# Patient Record
Sex: FEMALE | Race: WHITE
[De-identification: names, ages, dates, MRNs, and addresses within clinical notes are randomized per-mention and may not be internally consistent; named-entity substitution may affect disease eponyms.]

---

## 2017-01-19 ENCOUNTER — HOSPITAL ENCOUNTER (OUTPATIENT)
Dept: HOSPITAL 47 - LABWHC1 | Age: 52
Discharge: HOME | End: 2017-01-19
Payer: COMMERCIAL

## 2017-01-19 DIAGNOSIS — D64.9: ICD-10-CM

## 2017-01-19 DIAGNOSIS — C75.1: Primary | ICD-10-CM

## 2017-01-19 DIAGNOSIS — A49.02: ICD-10-CM

## 2017-01-19 DIAGNOSIS — R63.5: ICD-10-CM

## 2017-01-19 LAB
CH: 32.3
CHCM: 32.6
CHOLEST SERPL-MCNC: 244 MG/DL (ref ?–200)
ERYTHROCYTE [DISTWIDTH] IN BLOOD BY AUTOMATED COUNT: 4.62 M/UL (ref 3.8–5.4)
ERYTHROCYTE [DISTWIDTH] IN BLOOD: 12.9 % (ref 11.5–15.5)
HCT VFR BLD AUTO: 46 % (ref 34–46)
HDLC SERPL-MCNC: 101 MG/DL (ref 40–60)
HDW: 2.4
HGB BLD-MCNC: 14.8 GM/DL (ref 11.4–16)
IRON SERPL-MCNC: 174 UG/DL (ref 37–170)
MCH RBC QN AUTO: 32.1 PG (ref 25–35)
MCHC RBC AUTO-ENTMCNC: 32.2 G/DL (ref 31–37)
MCV RBC AUTO: 99.6 FL (ref 80–100)
TIBC SERPL-MCNC: 381 UG/DL (ref 265–497)
TRIGL SERPL-MCNC: 368 MG/DL (ref ?–150)
WBC # BLD AUTO: 7.7 K/UL (ref 3.8–10.6)

## 2017-01-19 PROCEDURE — 82533 TOTAL CORTISOL: CPT

## 2017-01-19 PROCEDURE — 85027 COMPLETE CBC AUTOMATED: CPT

## 2017-01-19 PROCEDURE — 83540 ASSAY OF IRON: CPT

## 2017-01-19 PROCEDURE — 87040 BLOOD CULTURE FOR BACTERIA: CPT

## 2017-01-19 PROCEDURE — 84439 ASSAY OF FREE THYROXINE: CPT

## 2017-01-19 PROCEDURE — 36415 COLL VENOUS BLD VENIPUNCTURE: CPT

## 2017-01-19 PROCEDURE — 80061 LIPID PANEL: CPT

## 2017-01-19 PROCEDURE — 84146 ASSAY OF PROLACTIN: CPT

## 2017-01-19 PROCEDURE — 84443 ASSAY THYROID STIM HORMONE: CPT

## 2017-01-19 PROCEDURE — 83550 IRON BINDING TEST: CPT

## 2017-01-31 ENCOUNTER — HOSPITAL ENCOUNTER (EMERGENCY)
Dept: HOSPITAL 47 - EC | Age: 52
Discharge: HOME | End: 2017-01-31
Payer: COMMERCIAL

## 2017-01-31 VITALS
RESPIRATION RATE: 17 BRPM | TEMPERATURE: 99 F | SYSTOLIC BLOOD PRESSURE: 129 MMHG | DIASTOLIC BLOOD PRESSURE: 64 MMHG | HEART RATE: 107 BPM

## 2017-01-31 DIAGNOSIS — F32.9: ICD-10-CM

## 2017-01-31 DIAGNOSIS — Z79.899: ICD-10-CM

## 2017-01-31 DIAGNOSIS — Z86.14: ICD-10-CM

## 2017-01-31 DIAGNOSIS — F17.200: ICD-10-CM

## 2017-01-31 DIAGNOSIS — L02.412: Primary | ICD-10-CM

## 2017-01-31 DIAGNOSIS — Z79.890: ICD-10-CM

## 2017-01-31 DIAGNOSIS — F90.9: ICD-10-CM

## 2017-01-31 DIAGNOSIS — F41.9: ICD-10-CM

## 2017-01-31 PROCEDURE — 10060 I&D ABSCESS SIMPLE/SINGLE: CPT

## 2017-01-31 PROCEDURE — 87205 SMEAR GRAM STAIN: CPT

## 2017-01-31 PROCEDURE — 99284 EMERGENCY DEPT VISIT MOD MDM: CPT

## 2017-01-31 PROCEDURE — 87186 SC STD MICRODIL/AGAR DIL: CPT

## 2017-01-31 PROCEDURE — 87070 CULTURE OTHR SPECIMN AEROBIC: CPT

## 2017-01-31 PROCEDURE — 87077 CULTURE AEROBIC IDENTIFY: CPT

## 2017-01-31 NOTE — US
EXAMINATION TYPE: US axilla extremity RT

 

DATE OF EXAM: 1/31/2017 7:56 PM

 

COMPARISON: NONE

 

CLINICAL HISTORY: Pain and redness in right axilla x4 days.

 

Grayscale, color Doppler imaging performed.

 

TECHNOLOGIST IMPRESSION:  Anechoic area with debris is visualized at the patient's area of pain and r
edness measuring 0.9 x 0.4 x 1.0 cm

 

 

 

IMPRESSION:  There may be a small area of phlegmon or abscess, sebaceous cyst with superinfection at 
the site of patient's clinical abnormality.

## 2017-01-31 NOTE — ED
Skin/Abscess/FB HPI





- General


Chief complaint: Skin/Abscess/Foreign Body


Stated complaint: Abcess underarm


Time Seen by Provider: 01/31/17 17:58


Source: patient, RN notes reviewed


Mode of arrival: ambulatory


Limitations: no limitations





- History of Present Illness


Initial comments: 





Patient is a 51 year female chief complaint of an abscess underneath her right 

arm.  Patient reports that she's noticed this for 3 days.  She states that the 

pain is worse with movement.  She did not states that the redness started to 

spread towards the anterior shoulder.  She reports that she had an abscess over 

her leg a few months ago however it cleared up with antibiotic speech she 

denies any significant history of resistant bacterial infections including MRSA 

or C. diff. 





- Related Data


 Home Medications











 Medication  Instructions  Recorded  Confirmed


 


Levothyroxine Sodium [Synthroid] 50 mcg PO DAILY 08/12/15 11/04/16


 


Lisinopril-Hctz 20-25 mg 1 tab PO DAILY 08/12/15 11/04/16





[Zestoretic 20-25]   


 


HYDROcodone/APAP 10-325MG [Norco 1 tab PO Q4H PRN 11/24/15 11/07/16





]   


 


Estrogens, Conjugated [Premarin] 0.3 mg PO DAILY 06/09/16 11/04/16


 


ARIPiprazole [Abilify] 25 mg PO DAILY 09/28/16 11/04/16


 


Dextroamphetamine/Amphetamine 20 mg PO TID 09/28/16 11/07/16





[Adderall]   


 


Venlafaxine HCl [Effexor XR] 225 mg PO DAILY 09/28/16 11/04/16








 Previous Rx's











 Medication  Instructions  Recorded


 


Ibuprofen [Motrin] 800 mg PO Q6HR PRN #90 tablet 01/27/16


 


Pregabalin [Lyrica] 150 mg PO Q8H #90 capsule 01/27/16


 


Acetaminophen-Codeine 300-30mg 1 tab PO Q4H PRN #12 tablet 01/31/17





[Tylenol #3]  


 


Sulfamethox-Tmp 800-160Mg [Bactrim 1 tab PO Q12HR #20 tab 01/31/17





-160 mg]  











 Allergies











Allergy/AdvReac Type Severity Reaction Status Date / Time


 


No Known Allergies Allergy   Verified 01/31/17 17:49














Review of Systems


ROS Statement: 


Those systems with pertinent positive or pertinent negative responses have been 

documented in the HPI.





ROS Other: All systems not noted in ROS Statement are negative.





Past Medical History


Past Medical History: Eye Disorder, Fibromyalgia, Hypertension, Pneumonia, 

Thyroid Disorder


Additional Past Medical History / Comment(s): horners syndrom rt eye, 

HYPOGLYCEMIA, ANEMIA


History of Any Multi-Drug Resistant Organisms: MRSA


Date of last positivie culture/infection: 9/28/16


MDRO Source:: ANUS


Past Surgical History: Bariatric Surgery, Cholecystectomy, Hernia Repair, 

Hysterectomy, Orthopedic Surgery


Additional Past Surgical History / Comment(s): tummy tuck; prev. trach. foot 

surgery ( right side took out extra toe ), left foot surgery, TRACHEOSTOMY/ AND 

REMOVED


Past Anesthesia/Blood Transfusion Reactions: No Reported Reaction


Past Psychological History: ADD/ADHD, Anxiety, Depression


Smoking Status: Current every day smoker


Past Alcohol Use History: None Reported


Additional Past Alcohol Use History / Comment(s): started smoking 1983, smokes 

1 PPD


Past Drug Use History: None Reported





- Past Family History


  ** Father


Family Medical History: Cancer





General Exam





- General Exam Comments


Initial Comments: 





Patient is a pleasant 51-year-old female.  She does not appear to be in any 

acute distress.


Limitations: no limitations


General appearance: alert, in no apparent distress


Head exam: Present: atraumatic, normocephalic, normal inspection


Eye exam: Present: normal appearance, PERRL, EOMI.  Absent: scleral icterus, 

conjunctival injection, periorbital swelling


ENT exam: Present: normal exam, mucous membranes moist


Neck exam: Present: normal inspection.  Absent: tenderness, meningismus, 

lymphadenopathy


Respiratory exam: Present: normal lung sounds bilaterally.  Absent: respiratory 

distress, wheezes, rales, rhonchi, stridor


Cardiovascular Exam: Present: regular rate, normal rhythm, normal heart sounds.

  Absent: systolic murmur, diastolic murmur, rubs, gallop, clicks


GI/Abdominal exam: Present: soft, normal bowel sounds.  Absent: distended, 

tenderness, guarding, rebound, rigid


Extremities exam: Present: normal inspection, full ROM, normal capillary 

refill.  Absent: tenderness, pedal edema, joint swelling, calf tenderness


  ** Right


Shoulder Exam: Present: full ROM.  Absent: normal inspection


Upper Arm exam: Present: normal inspection, tenderness (Patient has tenderness 

in an area of fluctuation underneath the right armpit.), erythema (in axilla).  

Absent: full ROM


Elbow exam: Present: normal inspection, full ROM


Forearm Wrist exam: Present: normal inspection, full ROM


Back exam: Present: normal inspection





Course


 Vital Signs











  01/31/17





  17:49


 


Temperature 99 F


 


Pulse Rate 107 H


 


Respiratory 17





Rate 


 


Blood Pressure 129/64


 


O2 Sat by Pulse 97





Oximetry 














Procedures





- Incision & Drainage


Consent Obtained: verbal consent


Indication: abscess


Site: upper extremity (right axilla)


Size (cm): 1


Anesthetic Used: benzocaine 0.25%


Amount (mLs): 6


I&D Cleaning Method: Iodine


Sterile Field Used?: Yes


Scalpel Used: #11


I&D Drainage Obtained: Pus


Packing: Iodoform


Culture Obtained?: Yes


Patient Tolerated Procedure: well, no complications





Medical Decision Making





- Medical Decision Making





Patient is a 51 year old female with right axilla abscess and superficial 

cellulitis measuring 7cm by 10 cm. Patient abscess was visualized under US and 

is approximately 1 cm in wideth and depth. Incision and drainage preformed and 

significant amount of pus was drained. Patient given iodoform packing and 

advised to remove in 48 hours. Patient given initial dose of bactrim and 

advised to follow up with PCP. Patient given prescription for tylenol 3. 

Patient instructed on return parameters and to follow up with PCP in 48 hours. 

Patient understands treatment plan and will comply. 





- Radiology Data


Radiology results: report reviewed


US of axilla shows small area of abscess with superficial infection overtop. 

Abscess measures 1cm by 1cm. 





Disposition


Clinical Impression: 


 Abscess of left axilla





Disposition: HOME SELF-CARE


Condition: Good


Instructions:  Abscess Incision and Drainage (ED)


Additional Instructions: 


Follow-up with primary care provider in one to 2 days.  Complete entire 

antibiotic prescription.  The packing in 48 hours.  Return to the EC if any 

alarming signs or symptoms occur.


Prescriptions: 


Acetaminophen-Codeine 300-30mg [Tylenol #3] 1 tab PO Q4H PRN #12 tablet


 PRN Reason: Pain


Sulfamethox-Tmp 800-160Mg [Bactrim -160 mg] 1 tab PO Q12HR #20 tab


Referrals: 


Chris Carcamo DO [Primary Care Provider] - 1-2 days


Time of Disposition: 20:18

## 2017-02-03 ENCOUNTER — HOSPITAL ENCOUNTER (INPATIENT)
Dept: HOSPITAL 47 - EC | Age: 52
LOS: 6 days | Discharge: HOME HEALTH | DRG: 854 | End: 2017-02-09
Payer: COMMERCIAL

## 2017-02-03 VITALS — BODY MASS INDEX: 32.1 KG/M2

## 2017-02-03 DIAGNOSIS — Z79.899: ICD-10-CM

## 2017-02-03 DIAGNOSIS — B95.62: ICD-10-CM

## 2017-02-03 DIAGNOSIS — E87.6: ICD-10-CM

## 2017-02-03 DIAGNOSIS — A41.01: Primary | ICD-10-CM

## 2017-02-03 DIAGNOSIS — Z98.84: ICD-10-CM

## 2017-02-03 DIAGNOSIS — E03.9: ICD-10-CM

## 2017-02-03 DIAGNOSIS — L03.111: ICD-10-CM

## 2017-02-03 DIAGNOSIS — B37.0: ICD-10-CM

## 2017-02-03 DIAGNOSIS — M79.7: ICD-10-CM

## 2017-02-03 DIAGNOSIS — I10: ICD-10-CM

## 2017-02-03 DIAGNOSIS — L02.411: ICD-10-CM

## 2017-02-03 DIAGNOSIS — F90.9: ICD-10-CM

## 2017-02-03 DIAGNOSIS — N61.1: ICD-10-CM

## 2017-02-03 DIAGNOSIS — F17.200: ICD-10-CM

## 2017-02-03 DIAGNOSIS — L02.412: ICD-10-CM

## 2017-02-03 DIAGNOSIS — N17.9: ICD-10-CM

## 2017-02-03 DIAGNOSIS — E44.0: ICD-10-CM

## 2017-02-03 LAB
ALP SERPL-CCNC: 138 U/L (ref 38–126)
ALT SERPL-CCNC: 39 U/L (ref 9–52)
ANION GAP SERPL CALC-SCNC: 17 MMOL/L
AST SERPL-CCNC: 29 U/L (ref 14–36)
BUN SERPL-SCNC: 41 MG/DL (ref 7–17)
CALCIUM SPEC-MCNC: 8.9 MG/DL (ref 8.4–10.2)
CELLS COUNTED: 200
CH: 32.1
CHCM: 33
CHLORIDE SERPL-SCNC: 109 MMOL/L (ref 98–107)
CO2 SERPL-SCNC: 18 MMOL/L (ref 22–30)
ERYTHROCYTE [DISTWIDTH] IN BLOOD BY AUTOMATED COUNT: 3.95 M/UL (ref 3.8–5.4)
ERYTHROCYTE [DISTWIDTH] IN BLOOD: 13.2 % (ref 11.5–15.5)
GLUCOSE SERPL-MCNC: 114 MG/DL (ref 74–99)
HCT VFR BLD AUTO: 38.6 % (ref 34–46)
HDW: 2.53
HGB BLD-MCNC: 12.6 GM/DL (ref 11.4–16)
MCH RBC QN AUTO: 31.8 PG (ref 25–35)
MCHC RBC AUTO-ENTMCNC: 32.6 G/DL (ref 31–37)
MCV RBC AUTO: 97.7 FL (ref 80–100)
NON-AFRICAN AMERICAN GFR(MDRD): 30
POTASSIUM SERPL-SCNC: 2.8 MMOL/L (ref 3.5–5.1)
PROT SERPL-MCNC: 5.9 G/DL (ref 6.3–8.2)
SODIUM SERPL-SCNC: 144 MMOL/L (ref 137–145)
WBC # BLD AUTO: 9.1 K/UL (ref 3.8–10.6)
WBC (PEROX): 9.31

## 2017-02-03 PROCEDURE — 96375 TX/PRO/DX INJ NEW DRUG ADDON: CPT

## 2017-02-03 PROCEDURE — 36415 COLL VENOUS BLD VENIPUNCTURE: CPT

## 2017-02-03 PROCEDURE — 96365 THER/PROPH/DIAG IV INF INIT: CPT

## 2017-02-03 PROCEDURE — 87205 SMEAR GRAM STAIN: CPT

## 2017-02-03 PROCEDURE — 76937 US GUIDE VASCULAR ACCESS: CPT

## 2017-02-03 PROCEDURE — 87075 CULTR BACTERIA EXCEPT BLOOD: CPT

## 2017-02-03 PROCEDURE — 83605 ASSAY OF LACTIC ACID: CPT

## 2017-02-03 PROCEDURE — 99284 EMERGENCY DEPT VISIT MOD MDM: CPT

## 2017-02-03 PROCEDURE — 87040 BLOOD CULTURE FOR BACTERIA: CPT

## 2017-02-03 PROCEDURE — 80053 COMPREHEN METABOLIC PANEL: CPT

## 2017-02-03 PROCEDURE — 85025 COMPLETE CBC W/AUTO DIFF WBC: CPT

## 2017-02-03 PROCEDURE — 36569 INSJ PICC 5 YR+ W/O IMAGING: CPT

## 2017-02-03 PROCEDURE — 87077 CULTURE AEROBIC IDENTIFY: CPT

## 2017-02-03 PROCEDURE — 87186 SC STD MICRODIL/AGAR DIL: CPT

## 2017-02-03 PROCEDURE — 83735 ASSAY OF MAGNESIUM: CPT

## 2017-02-03 PROCEDURE — 71260 CT THORAX DX C+: CPT

## 2017-02-03 PROCEDURE — 80048 BASIC METABOLIC PNL TOTAL CA: CPT

## 2017-02-03 PROCEDURE — 87070 CULTURE OTHR SPECIMN AEROBIC: CPT

## 2017-02-03 PROCEDURE — 77001 FLUOROGUIDE FOR VEIN DEVICE: CPT

## 2017-02-03 RX ADMIN — SODIUM CHLORIDE SCH MLS/HR: 9 INJECTION, SOLUTION INTRAVENOUS at 22:38

## 2017-02-03 RX ADMIN — CEFAZOLIN SCH: 330 INJECTION, POWDER, FOR SOLUTION INTRAMUSCULAR; INTRAVENOUS at 22:33

## 2017-02-03 RX ADMIN — Medication SCH MG: at 23:04

## 2017-02-03 RX ADMIN — HYDROCODONE BITARTRATE AND ACETAMINOPHEN PRN EACH: 10; 325 TABLET ORAL at 23:55

## 2017-02-03 RX ADMIN — HYDROMORPHONE HYDROCHLORIDE PRN MG: 1 INJECTION, SOLUTION INTRAMUSCULAR; INTRAVENOUS; SUBCUTANEOUS at 22:36

## 2017-02-03 RX ADMIN — CEFAZOLIN SCH MLS/HR: 330 INJECTION, POWDER, FOR SOLUTION INTRAMUSCULAR; INTRAVENOUS at 22:32

## 2017-02-03 RX ADMIN — PREGABALIN SCH MG: 75 CAPSULE ORAL at 23:55

## 2017-02-03 RX ADMIN — SODIUM CHLORIDE SCH MLS/HR: 9 INJECTION, SOLUTION INTRAVENOUS at 23:55

## 2017-02-03 NOTE — ED
General Adult HPI





- General


Chief complaint: Skin/Abscess/Foreign Body


Stated complaint: Weakness


Time Seen by Provider: 02/03/17 19:46


Source: patient, RN notes reviewed, old records reviewed


Mode of arrival: ambulatory


Limitations: no limitations





- History of Present Illness


Initial comments: 





Chief complaint and history of present illness a 51-year-old female who was 

seen emergency room several days ago had incision and drainage of an abscess on 

the right axilla.  The patient reports since that time she's been taking her 

antibiotics, Bactrim as directed.  But the cellulitis has increased now to the 

lower lateral chest wall and right breast area.  Patient reports she's also had 

increased pain decreased urine output decrease appetite.  Patient thinks she 

had a fever at home.





- Related Data


 Home Medications











 Medication  Instructions  Recorded  Confirmed


 


Levothyroxine Sodium [Synthroid] 50 mcg PO DAILY 08/12/15 11/04/16


 


Lisinopril-Hctz 20-25 mg 1 tab PO DAILY 08/12/15 11/04/16





[Zestoretic 20-25]   


 


HYDROcodone/APAP 10-325MG [Norco 1 tab PO Q4H PRN 11/24/15 11/07/16





]   


 


Estrogens, Conjugated [Premarin] 0.3 mg PO DAILY 06/09/16 11/04/16


 


ARIPiprazole [Abilify] 25 mg PO DAILY 09/28/16 11/04/16


 


Dextroamphetamine/Amphetamine 20 mg PO TID 09/28/16 11/07/16





[Adderall]   


 


Venlafaxine HCl [Effexor XR] 225 mg PO DAILY 09/28/16 11/04/16








 Previous Rx's











 Medication  Instructions  Recorded


 


Ibuprofen [Motrin] 800 mg PO Q6HR PRN #90 tablet 01/27/16


 


Pregabalin [Lyrica] 150 mg PO Q8H #90 capsule 01/27/16


 


Acetaminophen-Codeine 300-30mg 1 tab PO Q4H PRN #12 tablet 01/31/17





[Tylenol #3]  


 


Sulfamethox-Tmp 800-160Mg [Bactrim 1 tab PO Q12HR #20 tab 01/31/17





-160 mg]  











 Allergies











Allergy/AdvReac Type Severity Reaction Status Date / Time


 


No Known Allergies Allergy   Verified 02/03/17 19:42














Review of Systems


ROS Statement: 


Those systems with pertinent positive or pertinent negative responses have been 

documented in the HPI.


Review of systems.  No headache but she states she hasn't feel well.  She has 

pain through the area of sialitis from her right axilla to the right breast 

area.  No difficulty breathing.  No nausea no vomiting or diarrhea.  All 

systems were otherwise reviewed.  Past medical problems significant for 

fibromyalgia, coronary syndrome, hypertension, pneumonia, hypothyroidism.  The 

patient's surgeries include bariatric surgery followed by a tummy tuck, 

hysterectomy, hernia repair and cholecystectomy.  Family history not 

respiratory.  No known ALLERGIES.  Nonsmoker.














ROS Other: All systems not noted in ROS Statement are negative.





Past Medical History


Past Medical History: Eye Disorder, Fibromyalgia, Hypertension, Pneumonia, 

Thyroid Disorder


Additional Past Medical History / Comment(s): horners syndrom rt eye, 

HYPOGLYCEMIA, ANEMIA


History of Any Multi-Drug Resistant Organisms: MRSA


Date of last positivie culture/infection: 9/28/16


MDRO Source:: ANUS


Past Surgical History: Bariatric Surgery, Cholecystectomy, Hernia Repair, 

Hysterectomy, Orthopedic Surgery


Additional Past Surgical History / Comment(s): tummy tuck; prev. trach. foot 

surgery ( right side took out extra toe ), left foot surgery, TRACHEOSTOMY/ AND 

REMOVED


Past Anesthesia/Blood Transfusion Reactions: No Reported Reaction


Past Psychological History: ADD/ADHD, Anxiety, Depression


Smoking Status: Current every day smoker


Past Alcohol Use History: None Reported


Additional Past Alcohol Use History / Comment(s): started smoking 1983, smokes 

1 PPD


Past Drug Use History: None Reported





- Past Family History


  ** Father


Family Medical History: Cancer





General Exam





- General Exam Comments


Initial Comments: 





General:


The patient is awake and alert, complaining of worsening of her cellulitis and 

abscess to the right axillary region.  Patient states she doesn't feel well.  

Vital signs show temperature 98.2 pulse 60, respiratory rate 22 pulse ox 96% 

room air blood pressure 104/59.


Eye:


Pupils are equal, round and reactive to light, extra-ocular movements are intact

; there is normal conjunctiva bilaterally. No signs of icterus.  Patient has a 

history of coronary syndrome


Ears, nose, mouth and throat:


There are moist mucous membranes and no oral lesions.  Patient has multiple 

areas on her face and arms were she's picked at the skin.


Neck:


The neck is supple, there is no tenderness . 


Cardiovascular:


There is a regular rate and rhythm. No murmur, rub or gallop is appreciated.


Respiratory:


Lungs are clear to auscultation, respirations are non-labored, breath sounds 

are equal. No wheezes, stridor, rales, or rhonchi.


Gastrointestinal:


Decreased appetite no nausea no vomiting


Back:


There is no tenderness to palpation in the midline. There is no obvious 

deformity. No rashes noted. 


Musculoskeletal:


Cellulitis, draining abscess right axilla.  Reddened skin around the right 

breast and lateral chest


Neurological:


CN II-XII intact, There are no obvious motor or sensory deficits. Coordination 

appears grossly intact. Speech is normal.


Skin:


Cellulitis as noted above


 


Limitations: no limitations





Course


 Vital Signs











  02/03/17





  19:39


 


Temperature 98.2 F


 


Pulse Rate 60


 


Respiratory 22





Rate 


 


Blood Pressure 104/59


 


O2 Sat by Pulse 96





Oximetry 














Medical Decision Making





- Medical Decision Making





The patient have an IV started and she'll get hydration she'll be started on 

vancomycin admitted to Dr. Carcamo.  Cultures ordered been taken on the day that 

the I&D was done and is positive for MRSA.





Disposition


Clinical Impression: 


 Cellulitis due to MRSA





Disposition: ADMITTED AS IP TO THIS HOSP


Condition: Serious

## 2017-02-04 LAB
ALP SERPL-CCNC: 109 U/L (ref 38–126)
ALT SERPL-CCNC: 32 U/L (ref 9–52)
ANION GAP SERPL CALC-SCNC: 14 MMOL/L
AST SERPL-CCNC: 21 U/L (ref 14–36)
BUN SERPL-SCNC: 32 MG/DL (ref 7–17)
CALCIUM SPEC-MCNC: 8.5 MG/DL (ref 8.4–10.2)
CHLORIDE SERPL-SCNC: 112 MMOL/L (ref 98–107)
CO2 SERPL-SCNC: 16 MMOL/L (ref 22–30)
GLUCOSE SERPL-MCNC: 168 MG/DL (ref 74–99)
NON-AFRICAN AMERICAN GFR(MDRD): 53
POTASSIUM SERPL-SCNC: 3.5 MMOL/L (ref 3.5–5.1)
PROT SERPL-MCNC: 5 G/DL (ref 6.3–8.2)
SODIUM SERPL-SCNC: 142 MMOL/L (ref 137–145)

## 2017-02-04 RX ADMIN — FLUTICASONE PROPIONATE SCH SPRAY: 50 SPRAY, METERED NASAL at 08:08

## 2017-02-04 RX ADMIN — HYDROMORPHONE HYDROCHLORIDE PRN MG: 1 INJECTION, SOLUTION INTRAMUSCULAR; INTRAVENOUS; SUBCUTANEOUS at 13:18

## 2017-02-04 RX ADMIN — PANTOPRAZOLE SODIUM SCH MG: 40 TABLET, DELAYED RELEASE ORAL at 08:07

## 2017-02-04 RX ADMIN — LEVOTHYROXINE SODIUM SCH MCG: 50 TABLET ORAL at 06:55

## 2017-02-04 RX ADMIN — HYDROMORPHONE HYDROCHLORIDE PRN MG: 1 INJECTION, SOLUTION INTRAMUSCULAR; INTRAVENOUS; SUBCUTANEOUS at 16:43

## 2017-02-04 RX ADMIN — HYDROMORPHONE HYDROCHLORIDE PRN MG: 1 INJECTION, SOLUTION INTRAMUSCULAR; INTRAVENOUS; SUBCUTANEOUS at 09:34

## 2017-02-04 RX ADMIN — CEFAZOLIN SCH: 330 INJECTION, POWDER, FOR SOLUTION INTRAMUSCULAR; INTRAVENOUS at 16:41

## 2017-02-04 RX ADMIN — CEFAZOLIN SCH: 330 INJECTION, POWDER, FOR SOLUTION INTRAMUSCULAR; INTRAVENOUS at 21:37

## 2017-02-04 RX ADMIN — PREGABALIN SCH MG: 75 CAPSULE ORAL at 16:00

## 2017-02-04 RX ADMIN — HYDROCODONE BITARTRATE AND ACETAMINOPHEN PRN EACH: 10; 325 TABLET ORAL at 21:45

## 2017-02-04 RX ADMIN — NYSTATIN SCH UNIT: 100000 SUSPENSION ORAL at 21:47

## 2017-02-04 RX ADMIN — HYDROMORPHONE HYDROCHLORIDE PRN MG: 1 INJECTION, SOLUTION INTRAMUSCULAR; INTRAVENOUS; SUBCUTANEOUS at 19:56

## 2017-02-04 RX ADMIN — HYDROMORPHONE HYDROCHLORIDE PRN MG: 1 INJECTION, SOLUTION INTRAMUSCULAR; INTRAVENOUS; SUBCUTANEOUS at 01:20

## 2017-02-04 RX ADMIN — NYSTATIN SCH UNIT: 100000 SUSPENSION ORAL at 12:28

## 2017-02-04 RX ADMIN — HYDROMORPHONE HYDROCHLORIDE PRN MG: 1 INJECTION, SOLUTION INTRAMUSCULAR; INTRAVENOUS; SUBCUTANEOUS at 23:25

## 2017-02-04 RX ADMIN — HYDROCODONE BITARTRATE AND ACETAMINOPHEN PRN EACH: 10; 325 TABLET ORAL at 12:30

## 2017-02-04 RX ADMIN — PREGABALIN SCH MG: 75 CAPSULE ORAL at 08:07

## 2017-02-04 RX ADMIN — VENLAFAXINE HYDROCHLORIDE SCH MG: 75 CAPSULE, EXTENDED RELEASE ORAL at 08:07

## 2017-02-04 RX ADMIN — HYDROMORPHONE HYDROCHLORIDE PRN MG: 1 INJECTION, SOLUTION INTRAMUSCULAR; INTRAVENOUS; SUBCUTANEOUS at 06:28

## 2017-02-04 RX ADMIN — LISINOPRIL AND HYDROCHLOROTHIAZIDE SCH EACH: 25; 20 TABLET ORAL at 08:07

## 2017-02-04 RX ADMIN — ESTROGENS, CONJUGATED SCH MG: 0.3 TABLET, FILM COATED ORAL at 08:07

## 2017-02-04 RX ADMIN — NYSTATIN SCH UNIT: 100000 SUSPENSION ORAL at 08:14

## 2017-02-04 RX ADMIN — CEFAZOLIN SCH MLS/HR: 330 INJECTION, POWDER, FOR SOLUTION INTRAMUSCULAR; INTRAVENOUS at 16:00

## 2017-02-04 RX ADMIN — ACETAMINOPHEN PRN MG: 325 TABLET, FILM COATED ORAL at 15:36

## 2017-02-04 RX ADMIN — HYDROCODONE BITARTRATE AND ACETAMINOPHEN PRN EACH: 10; 325 TABLET ORAL at 06:56

## 2017-02-04 RX ADMIN — CEFAZOLIN SCH MLS/HR: 330 INJECTION, POWDER, FOR SOLUTION INTRAMUSCULAR; INTRAVENOUS at 07:57

## 2017-02-04 RX ADMIN — NYSTATIN SCH UNIT: 100000 SUSPENSION ORAL at 16:52

## 2017-02-04 NOTE — CT
EXAMINATION TYPE: CT chest w con

 

DATE OF EXAM: 2/4/2017 4:41 PM

 

COMPARISON: NONE

 

HISTORY: Pt states of right axilla abscess.

 

CT DLP: 412.9 mGycm

Automated exposure control for dose reduction was used.

 

CONTRAST: 

CT scan of the chest is performed with IV Contrast, patient injected with 70 mL of Omnipaque 300.

 

FINDINGS:

There is subcutaneous air in the right axillary region distributed over large region of the axilla. T
here is mild fat stranding in the right axilla. There is no drainable fluid collection.

 

There is some patchy groundglass interstitial infiltrate in the left upper lobe. There is no pleural 
effusion. There is no evidence of a pulmonary mass. Heart size is normal. There is a mild hiatal meliza
ia. There are no hilar masses. There is no mediastinal adenopathy. There is no evidence of aortic ane
urysm or dissection. The bony thorax is intact.

 

IMPRESSION:  Right axillary inflammatory changes with subcutaneous air consistent with a phlegmon and
 cellulitis. No drainable fluid collections seen.

 

Patchy left upper lobe interstitial infiltrate. Hiatal hernia.

## 2017-02-04 NOTE — HP
DATE OF ADMISSION:  02/03/2017



CHIEF COMPLAINT:  Abscess of the left axilla. 



HISTORY OF PRESENT ILLNESS:  Ms. Wu is a 51-year-old female with 

a past medical history of hypothyroidism, hypertension, anxiety, 

depression coming into the hospital with the chief complaint of left 

axillary abscess. The patient was seen on January 31, for a right 

axillary abscess and the patient did get I&D done.    She was sent 

home on Bactrim.  Patient states that she has been taking Bactrim, but 

states that her swelling and pain and redness did not improve much and 

so came in to the hospital for further evaluation.  On reviewing her 

results of the wound culture, they have been growing MRSA and the 

patient has been started on vancomycin and admitted to the hospital 

for further evaluation. The patient still complains of pain and 

redness and erythema in her right axilla that has been getting worse 

and the patient and is also having spikes of fever.  



REVIEW OF SYSTEMS:

CONSTITUTIONAL: Positive for fevers and chills. 

RESPIRATORY: No cough. No difficulty in breathing. 

CARDIAC: No chest pain or palpitations. 

GI: No abdominal pain, nausea, vomiting, or diarrhea. 

: No dysuria or hematuria. 

HEMATOLOGICAL: No history of recurrent infections or easy bruising. 

ENDOCRINE: Positive for hypothyroidism. 

PSYCHIATRIC: Positive for anxiety and depression. 

MUSCULOSKELETAL: No joint swelling, the deformity is positive for 

right axillary. 



All thirteen review of systems are done and negative except for the 

ones mentioned in the HPI.  



Past medical history is significant for fibromyalgia, hypertension, 

thyroid disorder.  



PAST SURGICAL HISTORY: Bariatric surgery, cholecystectomy, hernia 

repair, hysterectomy.  



PAST PSYCHIATRIC HISTORY: Positive for anxiety, depression, ADHD. 



SOCIAL HISTORY: She has history of smoking for almost 20 years and 

quit on fifteenth of January 2017. Occasional alcohol use.  



FAMILY HISTORY: Positive for cancer.  



Patient has no known drug allergies. 



Patient's home medications:

1. Lisinopril/Hydrochlorothiazide 20/25 1 tablets p.o. daily. 

2. Norco 10/325 1 tablets p.o. q.h.s. p.r.n. for pain.

3. 800 mg p.o. Motrin q.6 hours p.r.n. for pain.

4. Lyrica 150 mg p.o. q.h.s. 

5. Premarin 0.3 mg p.o. daily. 

6. Abilify 20 mg p.o. daily. 

7. Venlafaxine 225 mg p.o. daily. 

8. Bactrim 1 tablet double strength q.12 hours.

9. Levothyroxine 50 mcg p.o. daily. 

10. Adderall 30 mg p.o. 3 times a day. 

11. Baclofen 10/20 p.o. q.h.s. 

12. Abilify 5 mg p.o. daily. 

13. Protonix 40 mg p.o. daily. 

14. Fluticasone nasal spray.   



Patient's vital signs temperature 101.6, heart rate 110, respiratory  

rate 18, blood pressure 126/71, saturating at 95% on room air.  

GENERAL EXAMINATION: Patient appears to be in pain because of swelling 

and redness in her right axillary region.  

HEAD: Atraumatic, normocephalic. 

EYES: Pupils round and reactive to light. 

NECK: No JVD. No thyromegaly. 

CARDIOVASCULAR: S1, S2 heard. 

LUNGS: Bilateral breath sounds are positive. No wheeze or crackles. 

Examination of the right axillary region shows increased erythema, 

tenderness and superficial warmth with some palpable abscess in the 

skin below the axillary region which is very tender to touch. CNS: 

Alert, awake, oriented x3. No focal neurological deficits. Lower 

extremities, no edema. No cyanosis, no clubbing.  

PSYCHIATRIC: Appropriate mood and affect. 



Patient's labs: White count of 9.1, hemoglobin is 12.6, platelets of 

261, sodium 142, potassium 2.8, chloride 109, bicarb 18, BUN 41, 

creatinine 1.78, albumin 2.9, total protein of 5.9.  



ASSESSMENT AND PLAN: 

1. Sepsis secondary to her right axillary abscess. Wound culture is 

growing Methicillin-resistant Staph aureus. We will continue with the 

vancomycin. On examination, patient's still has abscess, so we will 

consult surgery for possible incision and drainage of the abscess, and 

infectious disease, Dr. Heard has been consulted.  

2. History of hypothyroidism. 

3. Fibromyalgia. 

4. Hypertension. 

5. Hypokalemia will replace potassium. 

6. Acute kidney injury, most likely secondary to sepsis. We will 

continue with IV fluids.  

7. Protein calorie malnutrition, moderate. 

8. History of recurrent Methicillin-resistant Staph aureus infections 

in the past.  

9. Nicotine dependence. 





PLAN: The plan is to continue with vancomycin and consult surgery for 

possible I&D and continue with IV fluids. We will repeat 

electrolytes for tomorrow morning and continue the rest of her 

medication regimen and further recommendations to follow depending on 

the progress of the patient.

## 2017-02-04 NOTE — P.GSCN
History of Present Illness


Consult date: 02/04/17


History of present illness: 





Patient is a 51  year old female who presented with a history of drained 

axillary abscess that is still very tender and draining through the previous 

incision. She has a lot of pain at the site of the drainage and is unable to 

lift her arm. She has had fever as well. SHe is not diabetic but has had 

multiple episode of MRSA sepsis





Review of Systems





- Constitutional


Reports anorexia, Reports chills





- EENT


Ears, nose, mouth and throat: Denies dysphagia





- Cardiovascular


Denies chest pain, Denies shortness of breath





- Respiratory


Respiratory Comment(s): 





diffciutly in breathing from pain


Denies cough, Denies 7





Past Medical History


Past Medical History: Eye Disorder, Fibromyalgia, Hypertension, Pneumonia, 

Thyroid Disorder


Additional Past Medical History / Comment(s): horners syndrom rt eye, 

HYPOGLYCEMIA, ANEMIA, MIGRAINES


History of Any Multi-Drug Resistant Organisms: MRSA


Year Discovered:: 9/28/16


MDRO Source:: ANUS


Past Surgical History: Bariatric Surgery, Cholecystectomy, Hernia Repair, 

Hysterectomy, Orthopedic Surgery


Additional Past Surgical History / Comment(s): tummy tuck; prev. trach. foot 

surgery ( right side took out extra toe ), left foot surgery, TRACHEOSTOMY/ AND 

REMOVED


Past Anesthesia/Blood Transfusion Reactions: No Reported Reaction


Past Psychological History: ADD/ADHD, Anxiety, Depression


Smoking Status: Former smoker


Past Alcohol Use History: None Reported


Additional Past Alcohol Use History / Comment(s): started smoking 1983, smoked 

1 PPD; pt states she quit 1/15/17


Past Drug Use History: None Reported





- Past Family History


  ** Father


Family Medical History: Cancer





Medications and Allergies


 Home Medications











 Medication  Instructions  Recorded  Confirmed  Type


 


Lisinopril-Hctz 20-25 mg 1 tab PO DAILY 08/12/15 02/03/17 History





[Zestoretic 20-25]    


 


RX: HYDROcodone/APAP 10-325MG 1 tab PO Q4H PRN 11/24/15 02/03/17 History





[Norco ]    


 


Estrogens, Conjugated [Premarin] 0.3 mg PO DAILY 06/09/16 02/03/17 History


 


ARIPiprazole [Abilify] 20 mg PO DAILY 09/28/16 02/03/17 History


 


Venlafaxine HCl [Effexor XR] 225 mg PO DAILY 09/28/16 02/03/17 History


 


ARIPiprazole [Abilify] 5 mg PO DAILY 02/03/17 02/03/17 History


 


Dextroamphetamine/Amphetamine 30 mg PO TID 02/03/17 02/03/17 History





[Adderall]    


 


Fluticasone Nasal Spray [Flonase 2 spray EA NOSTRIL DAILY 02/03/17 02/03/17 

History





Nasal Spray]    


 


Levothyroxine Sodium [Synthroid] 50 mcg PO DAILY 02/03/17 02/03/17 History


 


Pantoprazole [Protonix] 40 mg PO DAILY 02/03/17 02/03/17 History


 


RX: Baclofen 10 - 20 mg PO HS PRN 02/03/17 02/03/17 History











 Allergies











Allergy/AdvReac Type Severity Reaction Status Date / Time


 


No Known Allergies Allergy   Verified 02/03/17 20:10














Surgical - Exam


 Vital Signs











Temp Pulse Resp BP Pulse Ox


 


 98.2 F   60   22   104/59   96 


 


 02/03/17 19:39  02/03/17 19:39  02/03/17 19:39  02/03/17 19:39  02/03/17 19:39














- General


moderate distress, obese





- Eyes


PERRL, pale, no icteric





- ENT


no hearing loss, no congestion





- Neck


no masses, trachea midline





- Respiratory


normal expansion





- Cardiovascular


Rhythm: regular





- Integumentary





Is significant amount of induration and stenting of the entire right lateral 

chest and breast.  It was a small incision in the axilla which is not draining 

at this time.  There is no obvious fluctuation but the significant amount of 

dense indurated tissue.





Results





- Labs





 02/03/17 20:10





 02/04/17 08:40


 Abnormal Lab Results - Last 24 Hours (Table)











  02/04/17 Range/Units





  08:40 


 


Chloride  112 H  ()  mmol/L


 


Carbon Dioxide  16 L  (22-30)  mmol/L


 


BUN  32 H  (7-17)  mg/dL


 


Creatinine  1.10 H  (0.52-1.04)  mg/dL


 


Glucose  168 H  (74-99)  mg/dL


 


Total Protein  5.0 L  (6.3-8.2)  g/dL


 


Albumin  2.4 L  (3.5-5.0)  g/dL








 Diabetes panel











  02/04/17 Range/Units





  08:40 


 


Sodium  142  (137-145)  mmol/L


 


Potassium  3.5  (3.5-5.1)  mmol/L


 


Chloride  112 H  ()  mmol/L


 


Carbon Dioxide  16 L  (22-30)  mmol/L


 


BUN  32 H  (7-17)  mg/dL


 


Creatinine  1.10 H  (0.52-1.04)  mg/dL


 


Glucose  168 H  (74-99)  mg/dL


 


Calcium  8.5  (8.4-10.2)  mg/dL


 


AST  21  (14-36)  U/L


 


ALT  32  (9-52)  U/L


 


Alkaline Phosphatase  109  ()  U/L


 


Total Protein  5.0 L  (6.3-8.2)  g/dL


 


Albumin  2.4 L  (3.5-5.0)  g/dL








 Calcium panel











  02/04/17 Range/Units





  08:40 


 


Calcium  8.5  (8.4-10.2)  mg/dL


 


Albumin  2.4 L  (3.5-5.0)  g/dL








 Pituitary panel











  02/04/17 Range/Units





  08:40 


 


Sodium  142  (137-145)  mmol/L


 


Potassium  3.5  (3.5-5.1)  mmol/L


 


Chloride  112 H  ()  mmol/L


 


Carbon Dioxide  16 L  (22-30)  mmol/L


 


BUN  32 H  (7-17)  mg/dL


 


Creatinine  1.10 H  (0.52-1.04)  mg/dL


 


Glucose  168 H  (74-99)  mg/dL


 


Calcium  8.5  (8.4-10.2)  mg/dL








 Adrenal panel











  02/04/17 Range/Units





  08:40 


 


Sodium  142  (137-145)  mmol/L


 


Potassium  3.5  (3.5-5.1)  mmol/L


 


Chloride  112 H  ()  mmol/L


 


Carbon Dioxide  16 L  (22-30)  mmol/L


 


BUN  32 H  (7-17)  mg/dL


 


Creatinine  1.10 H  (0.52-1.04)  mg/dL


 


Glucose  168 H  (74-99)  mg/dL


 


Calcium  8.5  (8.4-10.2)  mg/dL


 


Total Bilirubin  0.4  (0.2-1.3)  mg/dL


 


AST  21  (14-36)  U/L


 


ALT  32  (9-52)  U/L


 


Alkaline Phosphatase  109  ()  U/L


 


Total Protein  5.0 L  (6.3-8.2)  g/dL


 


Albumin  2.4 L  (3.5-5.0)  g/dL














- Imaging


CT scan - abdomen: report reviewed, image reviewed





Assessment and Plan


(1) Cellulitis due to MRSA


Status: Acute   





(2) Abscess of left axilla


Status: Acute   


Plan: 





Patient's febrile with MRSA abscess.  She's multiple MRSA infections in the 

past.  She's.  She had sepsis from this.  She presents with a drained abscess 

in the right axilla with significant amount of air in the axilla which is not 

explained by the site of the incision.  She has significant amount of 

induration and infection to the right lateral chest.  Due to the extent and 

continued significant amount of drainage I recommended incision and drainage 

would some debridement of the tissues to help further and more drainage of the 

axillary abscess.  The risks and benefits the procedure were explained the 

patient understands which proceed.

## 2017-02-05 LAB
ANION GAP SERPL CALC-SCNC: 12 MMOL/L
BASOPHILS # BLD AUTO: 0 K/UL (ref 0–0.2)
BASOPHILS NFR BLD AUTO: 0 %
BUN SERPL-SCNC: 18 MG/DL (ref 7–17)
CALCIUM SPEC-MCNC: 8.6 MG/DL (ref 8.4–10.2)
CH: 31.8
CHCM: 32.1
CHLORIDE SERPL-SCNC: 108 MMOL/L (ref 98–107)
CO2 SERPL-SCNC: 20 MMOL/L (ref 22–30)
EOSINOPHIL # BLD AUTO: 0.1 K/UL (ref 0–0.7)
EOSINOPHIL NFR BLD AUTO: 2 %
ERYTHROCYTE [DISTWIDTH] IN BLOOD BY AUTOMATED COUNT: 3.4 M/UL (ref 3.8–5.4)
ERYTHROCYTE [DISTWIDTH] IN BLOOD: 13.2 % (ref 11.5–15.5)
GLUCOSE SERPL-MCNC: 91 MG/DL (ref 74–99)
HCT VFR BLD AUTO: 33.9 % (ref 34–46)
HDW: 2.52
HGB BLD-MCNC: 11.1 GM/DL (ref 11.4–16)
LUC NFR BLD AUTO: 2 %
LYMPHOCYTES # SPEC AUTO: 0.9 K/UL (ref 1–4.8)
LYMPHOCYTES NFR SPEC AUTO: 14 %
MCH RBC QN AUTO: 32.6 PG (ref 25–35)
MCHC RBC AUTO-ENTMCNC: 32.8 G/DL (ref 31–37)
MCV RBC AUTO: 99.7 FL (ref 80–100)
MONOCYTES # BLD AUTO: 0.2 K/UL (ref 0–1)
MONOCYTES NFR BLD AUTO: 3 %
NEUTROPHILS # BLD AUTO: 5.3 K/UL (ref 1.3–7.7)
NEUTROPHILS NFR BLD AUTO: 80 %
NON-AFRICAN AMERICAN GFR(MDRD): >60
POTASSIUM SERPL-SCNC: 4.1 MMOL/L (ref 3.5–5.1)
SODIUM SERPL-SCNC: 140 MMOL/L (ref 137–145)
WBC # BLD AUTO: 0.11 10*3/UL
WBC # BLD AUTO: 6.7 K/UL (ref 3.8–10.6)
WBC (PEROX): 7.32

## 2017-02-05 PROCEDURE — 0J960ZZ DRAINAGE OF CHEST SUBCUTANEOUS TISSUE AND FASCIA, OPEN APPROACH: ICD-10-PCS

## 2017-02-05 RX ADMIN — LEVOTHYROXINE SODIUM SCH MCG: 50 TABLET ORAL at 11:18

## 2017-02-05 RX ADMIN — NYSTATIN SCH UNIT: 100000 SUSPENSION ORAL at 14:30

## 2017-02-05 RX ADMIN — NYSTATIN SCH UNIT: 100000 SUSPENSION ORAL at 11:18

## 2017-02-05 RX ADMIN — PREGABALIN SCH MG: 75 CAPSULE ORAL at 11:18

## 2017-02-05 RX ADMIN — HYDROCODONE BITARTRATE AND ACETAMINOPHEN PRN EACH: 10; 325 TABLET ORAL at 23:13

## 2017-02-05 RX ADMIN — HYDROMORPHONE HYDROCHLORIDE PRN MG: 1 INJECTION, SOLUTION INTRAMUSCULAR; INTRAVENOUS; SUBCUTANEOUS at 03:22

## 2017-02-05 RX ADMIN — HYDROCODONE BITARTRATE AND ACETAMINOPHEN PRN EACH: 10; 325 TABLET ORAL at 00:51

## 2017-02-05 RX ADMIN — HYDROMORPHONE HYDROCHLORIDE STA MG: 1 INJECTION, SOLUTION INTRAMUSCULAR; INTRAVENOUS; SUBCUTANEOUS at 11:19

## 2017-02-05 RX ADMIN — ONDANSETRON STA: 2 INJECTION INTRAMUSCULAR; INTRAVENOUS at 11:20

## 2017-02-05 RX ADMIN — FLUTICASONE PROPIONATE SCH SPRAY: 50 SPRAY, METERED NASAL at 11:23

## 2017-02-05 RX ADMIN — SODIUM CHLORIDE SCH MLS/HR: 900 INJECTION, SOLUTION INTRAVENOUS at 20:18

## 2017-02-05 RX ADMIN — PREGABALIN SCH MG: 75 CAPSULE ORAL at 18:16

## 2017-02-05 RX ADMIN — ACETAMINOPHEN PRN MG: 325 TABLET, FILM COATED ORAL at 16:20

## 2017-02-05 RX ADMIN — SODIUM CHLORIDE SCH MLS/HR: 900 INJECTION, SOLUTION INTRAVENOUS at 11:17

## 2017-02-05 RX ADMIN — LISINOPRIL AND HYDROCHLOROTHIAZIDE SCH EACH: 25; 20 TABLET ORAL at 11:23

## 2017-02-05 RX ADMIN — PREGABALIN SCH MG: 75 CAPSULE ORAL at 00:15

## 2017-02-05 RX ADMIN — CEFAZOLIN SCH MLS/HR: 330 INJECTION, POWDER, FOR SOLUTION INTRAMUSCULAR; INTRAVENOUS at 20:19

## 2017-02-05 RX ADMIN — CEFAZOLIN SCH: 330 INJECTION, POWDER, FOR SOLUTION INTRAMUSCULAR; INTRAVENOUS at 20:19

## 2017-02-05 RX ADMIN — HYDROMORPHONE HYDROCHLORIDE STA MG: 1 INJECTION, SOLUTION INTRAMUSCULAR; INTRAVENOUS; SUBCUTANEOUS at 08:51

## 2017-02-05 RX ADMIN — ONDANSETRON STA MG: 2 INJECTION INTRAMUSCULAR; INTRAVENOUS at 08:53

## 2017-02-05 RX ADMIN — VENLAFAXINE HYDROCHLORIDE SCH MG: 75 CAPSULE, EXTENDED RELEASE ORAL at 11:23

## 2017-02-05 RX ADMIN — NYSTATIN SCH UNIT: 100000 SUSPENSION ORAL at 18:16

## 2017-02-05 RX ADMIN — SODIUM CHLORIDE SCH MLS/HR: 900 INJECTION, SOLUTION INTRAVENOUS at 00:51

## 2017-02-05 RX ADMIN — HYDROCODONE BITARTRATE AND ACETAMINOPHEN PRN EACH: 10; 325 TABLET ORAL at 16:20

## 2017-02-05 RX ADMIN — ESTROGENS, CONJUGATED SCH MG: 0.3 TABLET, FILM COATED ORAL at 11:18

## 2017-02-05 RX ADMIN — PANTOPRAZOLE SODIUM SCH MG: 40 TABLET, DELAYED RELEASE ORAL at 11:19

## 2017-02-05 RX ADMIN — Medication SCH: at 01:48

## 2017-02-05 RX ADMIN — CEFAZOLIN SCH MLS/HR: 330 INJECTION, POWDER, FOR SOLUTION INTRAMUSCULAR; INTRAVENOUS at 06:32

## 2017-02-05 RX ADMIN — PREGABALIN SCH MG: 75 CAPSULE ORAL at 23:21

## 2017-02-05 RX ADMIN — HYDROMORPHONE HYDROCHLORIDE PRN MG: 1 INJECTION, SOLUTION INTRAMUSCULAR; INTRAVENOUS; SUBCUTANEOUS at 07:09

## 2017-02-05 RX ADMIN — Medication SCH: at 23:06

## 2017-02-05 RX ADMIN — NYSTATIN SCH UNIT: 100000 SUSPENSION ORAL at 23:06

## 2017-02-05 RX ADMIN — HYDROMORPHONE HYDROCHLORIDE PRN MG: 1 INJECTION, SOLUTION INTRAMUSCULAR; INTRAVENOUS; SUBCUTANEOUS at 18:57

## 2017-02-05 RX ADMIN — HYDROMORPHONE HYDROCHLORIDE STA MG: 1 INJECTION, SOLUTION INTRAMUSCULAR; INTRAVENOUS; SUBCUTANEOUS at 08:46

## 2017-02-05 RX ADMIN — CEFAZOLIN SCH MLS/HR: 330 INJECTION, POWDER, FOR SOLUTION INTRAMUSCULAR; INTRAVENOUS at 11:37

## 2017-02-05 NOTE — CONS
DATE OF CONSULTATION:  02/04/2017 



REASON FOR CONSULTATION: Right axillary methicillin-resistant 

Staphylococcus aureus abscess and cellulitis.  



HISTORY OF PRESENT ILLNESS: The patient is a 51-year-old  

female who presented to the [facility] ER a few days ago with 

swelling and redness of her right axillary area that apparently 

started as a pimple and increased in size. Subsequently at that time 

the patient was evaluated and treated with I&D of this area. Culture 

was obtained, which later grew MRSA and the patient treated with oral 

Bactrim DS. Despite taking the Bactrim for a few days, the patient 

noticed the area to become more swollen and red and painful. Pain 

described to be throbbing, 7 to 8 out of 10 and no radiation. Patient 

has been complaining of fever with chills. The patient denies having 

any chest pain. No nausea, vomiting or any diarrhea. The patient had 

been started on vancomycin and admitted to the hospital. I was asked 

to see the patient for further recommendation regarding antibiotics.  



REVIEW OF SYSTEMS: 

CONSTITUTIONAL: Positive for weakness and chills. 

EYES: No complaint. 

ENT: No complaint. 

RESPIRATORY: No complaint.

CARDIOVASCULAR: No complaint. 

GENITOURINARY: No complaint.

GASTROINTESTINAL: No complaint. 

MUSCULOSKELETAL: No complaint. 

INTEGUMENTARY: As per HPI. 

PSYCHOLOGIC: No complaint. 

ENDOCRINE: No complaint. 

PSYCHOLOGIC: No complaint. 

NEUROLOGICAL: No complaint. 



PAST MEDICAL HISTORY:  syndrome right eye, fibromyalgia, 

hypertension, pneumonia, hypothyroidism. Also history of MRSA 

infection.  



PAST SURGICAL HISTORY: Cholecystectomy, hernia repair, hysterectomy, 

bariatric surgery,  left foot surgery, tracheostomy and 

subsequent removal.  



SOCIAL HISTORY: The patient currently is a very day smoker, smoking 

since 1983. Denies drinking or drug use.  



FAMILY HISTORY: Father with history of cancer. 



ALLERGIES: No known drug allergies. 



Medications include the patient is currently on Tylenol, Norco, 

Abilify, baclofen, Premarin, fluticasone, lisinopril, 

hydrochlorothiazide, Dilaudid, Synthroid, melatonin, Narcan, 

pip-tazobactam, vancomycin. Lyrica and Effexor.  



On examination, blood pressure is 126/71 with a pulse of 110, 

temperature 101.6. She is 95% on room air. General description is a 

middle-age female lying in bed in no distress. No tachypnea or 

accessory muscles of respiration use.  

HEENT EXAMINATION: No pallor or scleral icterus. Mucous membrane dry.

NECK: Trachea central. No thyromegaly. 

LUNGS: Unlabored breathing. Clear to auscultation anteriorly. 

HEART: S1, S2 with regular rate and rhythm. 

ABDOMEN: Soft. No tenderness. 

EXTREMITIES: No edema feet. 

Examination of right axilla showed a small incision, on pressure a 

significant amount of purulent material came out. No significant 

induration or fluctuation was noticed.  

NEUROLOGICAL: The patient is awake, alert, oriented x3. Mood and 

affect normal.  



LABS: Hemoglobin is 12.6, white count 9.1 with a BUN of 32, creatinine 

1.10. Blood culture obtained, currently pending. The patient did have 

a culture from the right axilla on the 31st which did show MRSA. The 

vancomycin DAWOOD of 2.  



DIAGNOSTIC IMPRESSION AND PLAN: 

Patient with methicillin-resistant Staphylococcus aureus right 

axillary abscess with the vancomycin DAWOOD of 2 and the patient did have 

significant fever despite being on vancomycin. Still concern for any 

fluid collection that we need to drain further. As no gram negative 

has been grown, to discontinue the Zosyn.  



PLAN: 

1. Discontinue the vancomycin as well as Zosyn.

2. Will start the patient on daptomycin 4 mg/kg daily. 

3. Await surgical evaluation for possible further I&D of this area. 

4. We will follow up with clinical condition and cultures to further 

adjust the medication if needed.  



Thank you for this consultation. I will follow this patient along with 

you.  



VITALY

## 2017-02-05 NOTE — P.OP
Date of Procedure: 02/05/17


Preoperative Diagnosis: 


Left axillary abscess


Postoperative Diagnosis: 


Left axillary abscess


Procedure(s) Performed: 


Incision and drainage of axillary abscess


Anesthesia: MAC


Surgeon: Kasey Aden


Estimated Blood Loss (ml): 30


Pathology: other (culture)


Condition: stable


Disposition: PACU


Indications for Procedure: 


Excessive drainage from small area of drainage in the axilla


Fluctuant area of abscess on right lateral chest wall





Operative Findings: 


8 x 8 x6 cm complex abscess cavity with multiple pockets extending beyond the 

main cavity


Extensive right chest/breast cellulitis


Description of Procedure: 


Informed consent patient was taken to the operating room placed in supine 

position with the right arm extended the patient had been appropriately marked 

prior to the procedure.  The patient was given IV sedation with LMA after which 

the patient was prepped and draped in usual sterile surgical fashion.  Timeout 

was called the procedure bacterial prophylaxis patient identity were all 

confirmed.  The area of the abscess was identified and a transverse incision 

was made with the help of electrocautery and deepened to skin and subcutis 

tissue into the cavity itself which should lead revealed a lot of pus.  The 

initial incision was also identified and the length of which was increased.  

Pulse lavage  was put into the cavity and the cavity itself was 

thoroughly irrigated.  The multiple septa within the cavity were broken with 

the help of blunt dissection.  The remaining cavity itself measures 

approximately 8 x 8x 6cm with multiple extensions.  Once the pus and all been 

drained there was significant oozing from the inflamed tissue.Cavity was then 

packed with lap pads which were removed later which have slowed down the 

effluent.  Hemostasis was secured with the help of electrocautery.  1 inch 

iodoform packing was packed into both wounds and the wound itself was covered 

with 4 x 4 and ABDs.  Patient ordered procedure well there were no 

complications she was extubated and taken to recovery room in stable condition.

## 2017-02-06 LAB
BASOPHILS # BLD AUTO: 0 K/UL (ref 0–0.2)
BASOPHILS NFR BLD AUTO: 0 %
CH: 31.6
CHCM: 31.7
EOSINOPHIL # BLD AUTO: 0.1 K/UL (ref 0–0.7)
EOSINOPHIL NFR BLD AUTO: 2 %
ERYTHROCYTE [DISTWIDTH] IN BLOOD BY AUTOMATED COUNT: 3.34 M/UL (ref 3.8–5.4)
ERYTHROCYTE [DISTWIDTH] IN BLOOD: 13.3 % (ref 11.5–15.5)
HCT VFR BLD AUTO: 33.5 % (ref 34–46)
HDW: 2.57
HGB BLD-MCNC: 10.8 GM/DL (ref 11.4–16)
LUC NFR BLD AUTO: 3 %
LYMPHOCYTES # SPEC AUTO: 1.1 K/UL (ref 1–4.8)
LYMPHOCYTES NFR SPEC AUTO: 20 %
MCH RBC QN AUTO: 32.2 PG (ref 25–35)
MCHC RBC AUTO-ENTMCNC: 32.1 G/DL (ref 31–37)
MCV RBC AUTO: 100.4 FL (ref 80–100)
MONOCYTES # BLD AUTO: 0.2 K/UL (ref 0–1)
MONOCYTES NFR BLD AUTO: 4 %
NEUTROPHILS # BLD AUTO: 3.9 K/UL (ref 1.3–7.7)
NEUTROPHILS NFR BLD AUTO: 71 %
WBC # BLD AUTO: 0.15 10*3/UL
WBC # BLD AUTO: 5.5 K/UL (ref 3.8–10.6)
WBC (PEROX): 5.97

## 2017-02-06 RX ADMIN — PREGABALIN SCH MG: 75 CAPSULE ORAL at 16:12

## 2017-02-06 RX ADMIN — SODIUM CHLORIDE SCH MLS/HR: 900 INJECTION, SOLUTION INTRAVENOUS at 20:16

## 2017-02-06 RX ADMIN — Medication SCH MG: at 20:16

## 2017-02-06 RX ADMIN — CEFAZOLIN SCH MLS/HR: 330 INJECTION, POWDER, FOR SOLUTION INTRAMUSCULAR; INTRAVENOUS at 11:18

## 2017-02-06 RX ADMIN — CEFAZOLIN SCH MLS/HR: 330 INJECTION, POWDER, FOR SOLUTION INTRAMUSCULAR; INTRAVENOUS at 03:37

## 2017-02-06 RX ADMIN — PANTOPRAZOLE SODIUM SCH MG: 40 TABLET, DELAYED RELEASE ORAL at 08:31

## 2017-02-06 RX ADMIN — PREGABALIN SCH MG: 75 CAPSULE ORAL at 08:31

## 2017-02-06 RX ADMIN — NYSTATIN SCH UNIT: 100000 SUSPENSION ORAL at 12:03

## 2017-02-06 RX ADMIN — HYDROCODONE BITARTRATE AND ACETAMINOPHEN PRN EACH: 10; 325 TABLET ORAL at 12:02

## 2017-02-06 RX ADMIN — CEFAZOLIN SCH: 330 INJECTION, POWDER, FOR SOLUTION INTRAMUSCULAR; INTRAVENOUS at 20:17

## 2017-02-06 RX ADMIN — SODIUM CHLORIDE SCH MLS/HR: 900 INJECTION, SOLUTION INTRAVENOUS at 10:07

## 2017-02-06 RX ADMIN — NYSTATIN SCH UNIT: 100000 SUSPENSION ORAL at 22:11

## 2017-02-06 RX ADMIN — NYSTATIN SCH UNIT: 100000 SUSPENSION ORAL at 17:49

## 2017-02-06 RX ADMIN — VENLAFAXINE HYDROCHLORIDE SCH MG: 75 CAPSULE, EXTENDED RELEASE ORAL at 08:33

## 2017-02-06 RX ADMIN — HYDROCODONE BITARTRATE AND ACETAMINOPHEN PRN EACH: 10; 325 TABLET ORAL at 16:14

## 2017-02-06 RX ADMIN — HYDROMORPHONE HYDROCHLORIDE PRN MG: 1 INJECTION, SOLUTION INTRAMUSCULAR; INTRAVENOUS; SUBCUTANEOUS at 14:19

## 2017-02-06 RX ADMIN — PREGABALIN SCH MG: 75 CAPSULE ORAL at 23:59

## 2017-02-06 RX ADMIN — HYDROMORPHONE HYDROCHLORIDE PRN MG: 1 INJECTION, SOLUTION INTRAMUSCULAR; INTRAVENOUS; SUBCUTANEOUS at 22:10

## 2017-02-06 RX ADMIN — FLUTICASONE PROPIONATE SCH SPRAY: 50 SPRAY, METERED NASAL at 08:31

## 2017-02-06 RX ADMIN — LISINOPRIL AND HYDROCHLOROTHIAZIDE SCH EACH: 25; 20 TABLET ORAL at 08:31

## 2017-02-06 RX ADMIN — HYDROCODONE BITARTRATE AND ACETAMINOPHEN PRN EACH: 10; 325 TABLET ORAL at 03:36

## 2017-02-06 RX ADMIN — LEVOTHYROXINE SODIUM SCH MCG: 50 TABLET ORAL at 06:43

## 2017-02-06 RX ADMIN — NYSTATIN SCH UNIT: 100000 SUSPENSION ORAL at 08:31

## 2017-02-06 RX ADMIN — HYDROMORPHONE HYDROCHLORIDE PRN MG: 1 INJECTION, SOLUTION INTRAMUSCULAR; INTRAVENOUS; SUBCUTANEOUS at 18:33

## 2017-02-06 RX ADMIN — CEFAZOLIN SCH MLS/HR: 330 INJECTION, POWDER, FOR SOLUTION INTRAMUSCULAR; INTRAVENOUS at 20:17

## 2017-02-06 RX ADMIN — ESTROGENS, CONJUGATED SCH MG: 0.3 TABLET, FILM COATED ORAL at 08:31

## 2017-02-06 RX ADMIN — HYDROMORPHONE HYDROCHLORIDE PRN MG: 1 INJECTION, SOLUTION INTRAMUSCULAR; INTRAVENOUS; SUBCUTANEOUS at 11:17

## 2017-02-06 RX ADMIN — HYDROCODONE BITARTRATE AND ACETAMINOPHEN PRN EACH: 10; 325 TABLET ORAL at 20:16

## 2017-02-06 RX ADMIN — HYDROCODONE BITARTRATE AND ACETAMINOPHEN PRN EACH: 10; 325 TABLET ORAL at 08:30

## 2017-02-06 NOTE — PN
DATE OF SERVICE:  02/05/2017 



Reason for followup is right axillary MRSA abscess.



INTERVAL HISTORY:  The patient is afebrile. The patient status post 

I&D of the right axillary chest wall abscess. Patient tolerated the 

procedure. Denies any worsening pain. The patient denies having any 

nausea, vomiting, abdominal pain or any diarrhea.   



On examination, blood pressure is 121/57 with a pulse of 103, 

temperature 98.7, T-max of 100.7. She is 95% on room air. 

General description is a middle-aged female, lying in bed in no 

distress.  

RESPIRATORY SYSTEM: Unlabored breathing. Clear to auscultation 

anteriorly.  

HEART: S1, S2, regular rate and rhythm.  

ABDOMEN:  Soft, no tenderness. 

Right axillary is currently packed up with minimal swelling and redness.



LABS: Hemoglobin is 11.1 with a white count of 6.7, BUN of 18, 

creatinine of 0.80.  



DIAGNOSTIC IMPRESSION AND PLAN: Patient with methicillin-resistant 

Staphylococcus aureus right axillary abscess. Failed outpatient 

Bactrim therapy, status post drainage of the abscess.  Patient at this 

time will continue with Teflaro because of high vanco 

methicillin-resistant Staphylococcus aureus. Revaluate the wound 

tomorrow. Continue supportive care.  



MTDD

## 2017-02-06 NOTE — P.PN
Subjective


Principal diagnosis: 


Sepsis





Patient is a 51-year-old  female with past medical history significant 

for hypothyroidism, hypertension, anxiety, depression, admitted with chief 

complaint of left axillary abscess, failed outpatient treatment.  Patient is 

status post incision and drainage by Dr. Aden, postop day #1.  Preliminary 

wound culture with presumptive MRSA.  Patient is being followed by Dr. Heard 

from infectious disease service.  Upon examination, patient complains of mild 

incisional pain currently rated 5 out of 10.  Denies chills, nausea, vomiting, 

shortness of breath, chest pain, or abdominal pain.  Patient is urinating 

without difficulty.  Patient afebrile this morning.  Hemodynamically stable.








Objective





- Vital Signs


Vital signs: 


 Vital Signs











Temp  97.7 F   02/06/17 15:00


 


Pulse  99   02/06/17 15:00


 


Resp  18   02/06/17 15:00


 


BP  125/74   02/06/17 15:00


 


Pulse Ox  99   02/06/17 15:00








 Intake & Output











 02/05/17 02/06/17 02/06/17





 18:59 06:59 18:59


 


Intake Total 850 1240 240


 


Output Total 30  


 


Balance 820 1240 240


 


Weight   84.822 kg


 


Intake:   


 


    


 


  Oral 200 1240 240


 


Output:   


 


  Estimated Blood Loss 30  


 


Other:   


 


  # Voids 1 2 4


 


  # Bowel Movements   1














- Exam


GENERAL: Pt awake and alert, well-appearing, well-nourished, and in no acute 

distress.


HEAD: Atraumatic, normocephalic.


EYES: Pupils equal and round. Sclera anicteric, conjunctiva are normal.


ENT: Moist mucous membranes. 


NECK:Supple without lymphadenopathy or JVD. 


LUNGS: Breath sounds clear to auscultation bilaterally.  No wheezes, rales, or 

rhonchi.


HEART: Heart S1, S2, no S3 or S4.  Regular rate and rhythm.  No murmurs, rubs 

or gallops.


ABDOMEN: Soft, obese, nontender, nondistended, normoactive bowel sounds.


EXTREMITIES: 2+ peripheral pulses. No edema. No calf tenderness.


NEUROLOGICAL: Pt oriented x 3.  No focal deficits.  Strength and sensation 

grossly intact.


PSYCH: Normal mood, normal affect.


SKIN: Warm, dry, intact.  Dressing to right axilla incision dry and intact.





- Labs


CBC & Chem 7: 


 02/06/17 08:25





 02/05/17 07:46


Labs: 


 Abnormal Lab Results - Last 24 Hours (Table)











  02/06/17 Range/Units





  08:25 


 


RBC  3.34 L  (3.80-5.40)  m/uL


 


Hgb  10.8 L  (11.4-16.0)  gm/dL


 


Hct  33.5 L  (34.0-46.0)  %


 


MCV  100.4 H  (80.0-100.0)  fL








 Microbiology - Last 24 Hours (Table)











 02/05/17 09:33 Gram Stain - Preliminary





 Axilla - Right Wound Culture - Preliminary





    Presumptive MRSA


 


 02/05/17 09:33 Anaerobic Culture - Preliminary





 Axilla - Right 


 


 02/04/17 16:00 Blood Culture - Preliminary





 Blood    No Growth after 24 hours


 


 02/04/17 16:00 Blood Culture - Preliminary





 Blood    No Growth after 24 hours














Assessment and Plan


Plan: 


Impression:





1.  Severe sepsis secondary to right axillary abscess with cultures growing 

MRSA status post incision and drainage on 02/05/2017.  Dr. Gaffney has been 

consulted for IV antibiotics.


2.  History of hypothyroidism.


3.  Fibromyalgia.


4.  Hypertension.


5.  Acute kidney injury, present on admission, suspect secondary to sepsis, 

improved.


6.  Protein calorie malnutrition, moderate.


7.  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


8.  Nicotine dependence.





Plan:





Continue to monitor patient.  Continue IV antibiotics per infectious disease 

recommendations.  Continue IV fluids.  Continue supportive treatment and pain 

management.  Wound care per surgical service.  Increase activity.  Continue 

incentive spirometry 10 times now while awake.  Continue GI and DVT 

prophylaxis.  Repeat CBC and BMP in a.m.





The above impression and plan have been discussed and directed by Dr. Carcamo. 

Lynette MCGOWAN acting as scribe for Dr. Carcamo.

## 2017-02-06 NOTE — PN
DATE OF SERVICE: 02/06/2017



REASON FOR FOLLOWUP: Right axilla MRSA abscess.



INTERVAL HISTORY: The patient is afebrile; has been breathing 

comfortably. Pain to the right axillary area is controlled with pain 

medication. Denies any chest pain, shortness of breath or cough. No 

abdominal pain or any diarrhea. On examination, blood pressure 125/74 

with a pulse of 99, temperature 97.7. She is 91% on room air. General 

description is a middle-aged female lying in bed in no distress.  

RESPIRATORY SYSTEM: Unlabored breathing. Clear to auscultation 

anteriorly.  

HEART: S1, S2. Regular rate and rhythm. 

The right axilla wound still is significantly deep. It is currently 

being packed with Aquacel Silver. No drainage was noticed at the time 

of dressing changes.  



LABS: Hemoglobin is 10.5 with a white count of 5.5 with a BUN of 18, 

creatinine 0.80.  



DIAGNOSTIC IMPRESSION AND PLAN: Patient with right axilla 

methicillin-resistant Staphylococcus aureus abscess, failing 

outpatient oral Bactrim therapy, status post extensive debridement. 

The wound is significantly deep, currently being packed with Aquacel 

Silver. Would recommend getting a PICC line and continuation of IV 

Teflaro as an outpatient for 2 weeks in view of the extensive 

infection and failing outpatient Bactrim.

## 2017-02-06 NOTE — PN
INTERVAL HISTORY: Ms. Wu is a 51-year-old female with a past 

medical history of hypothyroidism, hypertension, anxiety, depression 

coming into the hospital with a chief complaint of left axillary 

abscess. The patient was seen on January for the same. An I and D was 

done and she was sent home on antibiotics. Patient noticed her 

swelling and pain was not improving and came into the hospital. When 

she was admitted, patient had a large area of abscess that could be 

felt in the right axilla, so Surgical Service has been consulted and 

the patient had an I and D of the abscess done yesterday. Today the 

patient states she still has pain and tenderness in the right axillary 

region, but her swelling has decreased and the pain is much better 

than yesterday. She denies having any fevers, complains of some 

chills.  



REVIEW OF SYSTEMS:  

CARDIAC: No chest pain or palpitations. 

GI: No abdominal pain, nausea, vomiting or diarrhea. 

: No dysuria or hematuria. 

RESPIRATORY: No cough. No difficulty in breathing. 



Patient's medications have been reviewed. She is on ceftaroline 

antibiotic:  



Patient's vital signs: Temperature is 100.7, heart rate 90 to 110, 

respiratory rate 20, blood pressure 121/67, saturating at 95% on room 

air. On examination, patient's, appears to be in no acute distress.  

HEAD: Atraumatic, normocephalic. 

EYES: Pupils round and reactive to light. 

NECK: No JVD. No thyromegaly. 

CARDIOVASCULAR: S1, S2 heard. 

LUNGS: Bilateral breath sounds are positive. No other crackles. 

ABDOMEN: Soft, nontender. Bowel sounds positive. 

EXTREMITIES: No cyanosis. No clubbing. Peripheral pulses are felt. 

CNS: Alert, awake, oriented x3. Right axillary lesion shows that her 

erythema has decreased and decreased swelling of the axillary region 

compared to yesterday.  

PSYCHIATRIC: Appropriate mood and affect. 



Patient's labs: White count of 6.7, hemoglobin is 11.1, platelets of 

211. Sodium 140, potassium 4.1, chloride 108, bicarb 20, BUN 18, 

creatinine 0.80.  



ASSESSMENT AND PLAN:

1. Sepsis secondary to right axillary abscess, wound culture showing 

methicillin-resistant Staphylococcus aureus, so the patient was 

started on vancomycin. Infectious disease Dr. Heard has been 

consulted, who discontinued her vancomycin and Zosyn and the patient 

was to be started on daptomycin.  

2. History of hypothyroidism. 

3. Fibromyalgia. 

4. Hypertension. 

5. Hypokalemia, resolved. 

6. Acute kidney injury to secondary to sepsis. Continue with IV fluids. 

7. Protein-calorie malnutrition, moderate. 

8. History of recurrent methicillin-resistant Staphylococcus aureus 

infections in the past.  

9. Nicotine dependence. 



PLAN: The plan is to continue the patient on antibiotics as per ID 

recommendations. She is status post I and D by Surgical Services. 

Continue with the rest of her medication regimen and further 

recommendations to follow, depending on the progress of the patient.

## 2017-02-06 NOTE — P.PN
Subjective





51-year-old female being seen this morning with the attending at the bedside.  

Surgical services involved in the plan of care at the request of the attending 

for treatment of axillary abscess symptomatic the patient was had multiple 

episodes of MRSA patient did undergo an February 5 extensive drainage from a 

small area of drainage in the right axillary.   an incision and drainage done 

of the right axillary abscess





The dressing was removed by the surgeon  the area was inspected  wound care was 

ordered  patient states pain medication is effective for pain control.





Objective





- Vital Signs


Vital signs: 


 Vital Signs











Temp  98.9 F   02/06/17 07:00


 


Pulse  95   02/06/17 07:00


 


Resp  16   02/06/17 07:00


 


BP  118/69   02/06/17 07:00


 


Pulse Ox  97   02/06/17 07:00








 Intake & Output











 02/05/17 02/06/17 02/06/17





 18:59 06:59 18:59


 


Intake Total 850 1240 240


 


Output Total 30  


 


Balance 820 1240 240


 


Intake:   


 


    


 


  Oral 200 1240 240


 


Output:   


 


  Estimated Blood Loss 30  


 


Other:   


 


  # Voids 1 2 














- Exam





Physical exam


51-year-old female resting in bed appears in no acute distress


Lungs essentially clear adequate air movement


Heart S1-S2 audible regular


Chest the right lateral chest wall dressing removed packing removed from the 

area there is an 8 x 8 x 6 cm complex abscess cavity.  No odor noted from the 

drainage on the dressings


Abdomen soft nontender no reports of frequent stooling


Extremities no evidence of pedal edema





- Labs


CBC & Chem 7: 


 02/06/17 08:25





 02/05/17 07:46


Labs: 


 Abnormal Lab Results - Last 24 Hours (Table)











  02/06/17 Range/Units





  08:25 


 


RBC  3.34 L  (3.80-5.40)  m/uL


 


Hgb  10.8 L  (11.4-16.0)  gm/dL


 


Hct  33.5 L  (34.0-46.0)  %


 


MCV  100.4 H  (80.0-100.0)  fL








 Microbiology - Last 24 Hours (Table)











 02/05/17 09:33 Gram Stain - Preliminary





 Axilla - Right Wound Culture - Preliminary





    Presumptive MRSA


 


 02/05/17 09:33 Anaerobic Culture - Preliminary





 Axilla - Right 


 


 02/04/17 16:00 Blood Culture - Preliminary





 Blood    No Growth after 24 hours


 


 02/04/17 16:00 Blood Culture - Preliminary





 Blood    No Growth after 24 hours














Assessment and Plan


Plan: 





Impression


Status post February 5 incision and drainage of a right axillary abscess


Current every day smoker chronic nicotine dependency


History of MRSA right axillary with prior abscess


Wound culture right axillary abscess positive for MRSA per wound culture


Excessive drainage from a small area in the right axillary with fluctuant area 

of abscess in the right lateral chest wall

















Plan


Continue postop surgical care as ordered


Continue recommendations by infectious disease Dr. Heard


Wound care Aquacel AG rope to the right axillary area daily


Pain control


DVT and GI prophylaxis patient's been advised to stop smoking cigarettes








The above dictated assessment and findings were discussed with dr hutchinson .  

Impression and the plan of care have been dictated as directed.  Sarah Kim 

nurse practitioner acting as a scribe for dr hutchinson.

## 2017-02-07 LAB
ANION GAP SERPL CALC-SCNC: 11 MMOL/L
BASOPHILS # BLD AUTO: 0 K/UL (ref 0–0.2)
BASOPHILS NFR BLD AUTO: 0 %
BUN SERPL-SCNC: 10 MG/DL (ref 7–17)
CALCIUM SPEC-MCNC: 7.9 MG/DL (ref 8.4–10.2)
CH: 31.7
CHCM: 32
CHLORIDE SERPL-SCNC: 109 MMOL/L (ref 98–107)
CO2 SERPL-SCNC: 22 MMOL/L (ref 22–30)
EOSINOPHIL # BLD AUTO: 0.1 K/UL (ref 0–0.7)
EOSINOPHIL NFR BLD AUTO: 2 %
ERYTHROCYTE [DISTWIDTH] IN BLOOD BY AUTOMATED COUNT: 3.22 M/UL (ref 3.8–5.4)
ERYTHROCYTE [DISTWIDTH] IN BLOOD: 13.3 % (ref 11.5–15.5)
GLUCOSE SERPL-MCNC: 92 MG/DL (ref 74–99)
HCT VFR BLD AUTO: 32.1 % (ref 34–46)
HDW: 2.63
HGB BLD-MCNC: 10.4 GM/DL (ref 11.4–16)
LUC NFR BLD AUTO: 2 %
LYMPHOCYTES # SPEC AUTO: 1.2 K/UL (ref 1–4.8)
LYMPHOCYTES NFR SPEC AUTO: 22 %
MAGNESIUM SPEC-SCNC: 1.8 MG/DL (ref 1.6–2.3)
MCH RBC QN AUTO: 32.3 PG (ref 25–35)
MCHC RBC AUTO-ENTMCNC: 32.4 G/DL (ref 31–37)
MCV RBC AUTO: 99.6 FL (ref 80–100)
MONOCYTES # BLD AUTO: 0.2 K/UL (ref 0–1)
MONOCYTES NFR BLD AUTO: 4 %
NEUTROPHILS # BLD AUTO: 3.7 K/UL (ref 1.3–7.7)
NEUTROPHILS NFR BLD AUTO: 69 %
NON-AFRICAN AMERICAN GFR(MDRD): >60
POTASSIUM SERPL-SCNC: 3.5 MMOL/L (ref 3.5–5.1)
SODIUM SERPL-SCNC: 142 MMOL/L (ref 137–145)
WBC # BLD AUTO: 0.13 10*3/UL
WBC # BLD AUTO: 5.4 K/UL (ref 3.8–10.6)
WBC (PEROX): 5.61

## 2017-02-07 PROCEDURE — B548ZZA ULTRASONOGRAPHY OF SUPERIOR VENA CAVA, GUIDANCE: ICD-10-PCS

## 2017-02-07 PROCEDURE — B5181ZA FLUOROSCOPY OF SUPERIOR VENA CAVA USING LOW OSMOLAR CONTRAST, GUIDANCE: ICD-10-PCS

## 2017-02-07 PROCEDURE — 02HV33Z INSERTION OF INFUSION DEVICE INTO SUPERIOR VENA CAVA, PERCUTANEOUS APPROACH: ICD-10-PCS

## 2017-02-07 RX ADMIN — NYSTATIN SCH UNIT: 100000 SUSPENSION ORAL at 07:53

## 2017-02-07 RX ADMIN — SODIUM CHLORIDE SCH MLS/HR: 900 INJECTION, SOLUTION INTRAVENOUS at 07:52

## 2017-02-07 RX ADMIN — LISINOPRIL AND HYDROCHLOROTHIAZIDE SCH EACH: 25; 20 TABLET ORAL at 07:54

## 2017-02-07 RX ADMIN — HYDROCODONE BITARTRATE AND ACETAMINOPHEN PRN EACH: 10; 325 TABLET ORAL at 20:42

## 2017-02-07 RX ADMIN — MAGNESIUM SULFATE IN DEXTROSE SCH MLS/HR: 10 INJECTION, SOLUTION INTRAVENOUS at 12:44

## 2017-02-07 RX ADMIN — POTASSIUM CHLORIDE SCH MEQ: 20 TABLET, EXTENDED RELEASE ORAL at 12:44

## 2017-02-07 RX ADMIN — CEFAZOLIN SCH: 330 INJECTION, POWDER, FOR SOLUTION INTRAMUSCULAR; INTRAVENOUS at 22:05

## 2017-02-07 RX ADMIN — HYDROCODONE BITARTRATE AND ACETAMINOPHEN PRN EACH: 10; 325 TABLET ORAL at 00:09

## 2017-02-07 RX ADMIN — POTASSIUM CHLORIDE SCH MEQ: 20 TABLET, EXTENDED RELEASE ORAL at 14:02

## 2017-02-07 RX ADMIN — ESTROGENS, CONJUGATED SCH MG: 0.3 TABLET, FILM COATED ORAL at 07:53

## 2017-02-07 RX ADMIN — VENLAFAXINE HYDROCHLORIDE SCH MG: 75 CAPSULE, EXTENDED RELEASE ORAL at 07:54

## 2017-02-07 RX ADMIN — NYSTATIN SCH UNIT: 100000 SUSPENSION ORAL at 11:36

## 2017-02-07 RX ADMIN — CEFAZOLIN SCH MLS/HR: 330 INJECTION, POWDER, FOR SOLUTION INTRAMUSCULAR; INTRAVENOUS at 04:29

## 2017-02-07 RX ADMIN — PREGABALIN SCH MG: 75 CAPSULE ORAL at 16:04

## 2017-02-07 RX ADMIN — HYDROMORPHONE HYDROCHLORIDE PRN MG: 1 INJECTION, SOLUTION INTRAMUSCULAR; INTRAVENOUS; SUBCUTANEOUS at 04:25

## 2017-02-07 RX ADMIN — NYSTATIN SCH UNIT: 100000 SUSPENSION ORAL at 17:07

## 2017-02-07 RX ADMIN — MAGNESIUM SULFATE IN DEXTROSE SCH MLS/HR: 10 INJECTION, SOLUTION INTRAVENOUS at 14:02

## 2017-02-07 RX ADMIN — Medication SCH MG: at 20:42

## 2017-02-07 RX ADMIN — SODIUM CHLORIDE SCH MLS/HR: 900 INJECTION, SOLUTION INTRAVENOUS at 20:43

## 2017-02-07 RX ADMIN — FLUTICASONE PROPIONATE SCH SPRAY: 50 SPRAY, METERED NASAL at 07:53

## 2017-02-07 RX ADMIN — LEVOTHYROXINE SODIUM SCH MCG: 50 TABLET ORAL at 06:25

## 2017-02-07 RX ADMIN — NYSTATIN SCH UNIT: 100000 SUSPENSION ORAL at 20:43

## 2017-02-07 RX ADMIN — HYDROCODONE BITARTRATE AND ACETAMINOPHEN PRN EACH: 10; 325 TABLET ORAL at 11:36

## 2017-02-07 RX ADMIN — HYDROCODONE BITARTRATE AND ACETAMINOPHEN PRN EACH: 10; 325 TABLET ORAL at 16:04

## 2017-02-07 RX ADMIN — PREGABALIN SCH MG: 75 CAPSULE ORAL at 07:54

## 2017-02-07 RX ADMIN — HYDROCODONE BITARTRATE AND ACETAMINOPHEN PRN EACH: 10; 325 TABLET ORAL at 04:25

## 2017-02-07 RX ADMIN — PANTOPRAZOLE SODIUM SCH MG: 40 TABLET, DELAYED RELEASE ORAL at 07:54

## 2017-02-07 RX ADMIN — HYDROMORPHONE HYDROCHLORIDE PRN MG: 1 INJECTION, SOLUTION INTRAMUSCULAR; INTRAVENOUS; SUBCUTANEOUS at 01:19

## 2017-02-07 RX ADMIN — HYDROMORPHONE HYDROCHLORIDE PRN MG: 1 INJECTION, SOLUTION INTRAMUSCULAR; INTRAVENOUS; SUBCUTANEOUS at 07:52

## 2017-02-07 NOTE — IR
PICC LINE PLACEMENT:

 

HISTORY:  Infection requiring long-term antibiotic therapy

 

PROCEDURE:  Ultrasound and fluoroscopic guidance of PICC line placement.

 

COMPLICATIONS:  None

 

ANESTHESIA:  1. 1% Lidocaine locally.

 

FINDINGS/TECHNIQUE:  The procedure was explained to the patient.  The risks, complications, benefits 
and alternatives were discussed and any questions were answered.  Informed consent was obtained.  The
 patient was placed supine on the fluoroscopic table and prepped and draped in the usual sterile [facility]
ion.  Utilizing a  21 gauge needle and sonographic and fluoroscopic guidance, access in the vein was 
achieved and there is placement of a 0.018 guidewire.  The vein is patent.  A 4-F sheath was placed o
haris the guidewire.  The guidewire and dilator were removed and a 4-F. PICC line was placed through th
e sheath with the tip at the level of the SVC.  The sheath was removed, the catheter was flushed and 
sutured into position.  The patient was stable throughout the procedure and remained stable upon disc
harge from the Department of Radiology. 

 

The vein puncture was patent under ultrasound. A gray scale image was obtained to document patency of
 the vein punctured.

 

All elements of the maximal barrier technique were utilized.

 

FLUOROSCOPY TIME:  0.3 minutes

 

IMPRESSION: 

Successful PICC line placement under ultrasound and fluoroscopic guidance.

## 2017-02-07 NOTE — P.PN
Subjective


Principal diagnosis: 


Sepsis





Patient is a 51-year-old  female with past medical history significant 

for hypothyroidism, hypertension, anxiety, depression, admitted with chief 

complaint of left axillary abscess, failed outpatient treatment.  Patient is 

status post incision and drainage by Dr. Aden, postop day #2.  Final wound 

culture with positive for MRSA.  Patient did receive a PICC line today and will 

be discharged on IV antibiotics for 2 weeks per Dr. Lind's recommendations.  

Local wound care per Dr. Aden.  Upon examination, patient complains of moderate 

incisional pain. Denies chills, nausea, vomiting, shortness of breath, chest 

pain, or abdominal pain.  Patient is urinating without difficulty.  Afebrile.  

Hemodynamically stable.








Objective





- Vital Signs


Vital signs: 


 Vital Signs











Temp  97.6 F   02/07/17 07:00


 


Pulse  89   02/07/17 07:00


 


Resp  20   02/07/17 08:00


 


BP  110/69   02/07/17 07:00


 


Pulse Ox  98   02/07/17 07:00








 Intake & Output











 02/06/17 02/07/17 02/07/17





 18:59 06:59 18:59


 


Intake Total 240 120 


 


Balance 240 120 


 


Weight 84.822 kg  


 


Intake:   


 


  Oral 240 120 


 


Other:   


 


  Voiding Method  Bedside Commode Bedside Commode


 


  # Voids 1 1 1


 


  # Bowel Movements 1  1














- Exam


GENERAL: Pt awake and alert, well-appearing, well-nourished, and in no acute 

distress.


HEAD: Atraumatic, normocephalic.


EYES: Pupils equal and round. Sclera anicteric, conjunctiva are normal.


ENT: Moist mucous membranes.  White exudate noted to tongue.


NECK:Supple without lymphadenopathy or JVD. 


LUNGS: Breath sounds clear to auscultation bilaterally.  No wheezes, rales, or 

rhonchi.


HEART: Heart S1, S2, no S3 or S4.  Regular rate and rhythm.  No murmurs, rubs 

or gallops.


ABDOMEN: Soft, obese, nontender, nondistended, normoactive bowel sounds.


EXTREMITIES: 2+ peripheral pulses. No edema. No calf tenderness.


NEUROLOGICAL: Pt oriented x 3.  No focal deficits.  Strength and sensation 

grossly intact.


PSYCH: Normal mood, normal affect.


SKIN: Warm, dry, intact.  Dressing to right axilla incision dry and intact.





- Labs


CBC & Chem 7: 


 02/07/17 10:00





 02/07/17 10:00


Labs: 


 Abnormal Lab Results - Last 24 Hours (Table)











  02/07/17 02/07/17 Range/Units





  10:00 10:00 


 


RBC  3.22 L   (3.80-5.40)  m/uL


 


Hgb  10.4 L   (11.4-16.0)  gm/dL


 


Hct  32.1 L   (34.0-46.0)  %


 


Chloride   109 H  ()  mmol/L


 


Calcium   7.9 L  (8.4-10.2)  mg/dL








 Microbiology - Last 24 Hours (Table)











 02/05/17 09:33 Anaerobic Culture - Preliminary





 Axilla - Right 


 


 02/05/17 09:33 Gram Stain - Final





 Axilla - Right Wound Culture - Final





    Methicillin resist S. aureus


 


 02/04/17 16:00 Blood Culture - Preliminary





 Blood    No Growth after 48 hours


 


 02/04/17 16:00 Blood Culture - Preliminary





 Blood    No Growth after 48 hours














Assessment and Plan


Plan: 


Impression:





1.  Severe sepsis secondary to right axillary abscess with cultures growing 

MRSA status post incision and drainage on 02/05/2017.  Dr. Heard is following 

patient for recommendations of IV antibiotics.  Dr. Aden is managing local 

wound care.


2.  History of hypothyroidism.


3.  Fibromyalgia.


4.  Hypertension.


5.  Acute kidney injury, present on admission, suspect secondary to sepsis, 

improved.


6.  Protein calorie malnutrition, moderate.


7.  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


8.  Nicotine dependence.


9.  Oral thrush.





Plan:





Continue to monitor patient.  Continue IV antibiotics per infectious disease 

recommendations.  Continue IV fluids.  Continue supportive treatment and pain 

management.  Wound care per surgical service.  Increase activity.  Continue 

incentive spirometry 10 times now while awake.  Continue GI and DVT 

prophylaxis.  Repeat CBC and BMP in a.m. Anticipate discharge in next 24 hours.





The above impression and plan have been discussed and directed by Dr. Carcamo. 

Lynette MCGOWAN acting as scribe for Dr. Carcamo.

## 2017-02-07 NOTE — PN
DATE OF SERVICE: 02/07/2017



Reason for follow-up:  Right ankle methicillin-resistant 

Staphylococcus aureus abscess.  



INTERVAL HISTORY:  The patient is afebrile. She does complain of some 

pain in her right axilla and breast area but denies having any chest 

pain or shortness of breath, no cough, or abdominal pain or any 

diarrhea.  



On examination, blood pressure is 110/69 with a pulse of 89, 

temperature 97.6. She is 98% on room air. General description is a 

middle-age female lying in bed in no distress. Left axilla, wound is 

currently  packed up.  The redness on the chest wall decreased.  Lungs 

unlabored breathing. Clear to auscultation anteriorly.  

HEART: S1, S2 with regular rate and rhythm. 

ABDOMEN: Soft.  No tenderness. 



LABS: Hemoglobin is 10.4, white count 5.4 with a BUN of 10, creatinine 

0.58.  Culture positive for MRSA. Blood culture has been negative.   



DIAGNOSTIC IMPRESSION AND PLAN: Patient with Methicillin-resistant 

Staph aureus  abscess, status post drainage. Culture  with 

draining , failure  outpatient oral Bactrim in a patient who did have  

high DAWOOD on the Vanco. Currently responding to the Teleflora that 

will be continued for 2 weeks along with Aquacel silver packing of the 

wound. Follow-up in the office in a week. Continue supportive care. 

Plan of care was discussed with the nurse practitioner for the primary 

team.  



VITALY

## 2017-02-07 NOTE — P.PN
Subjective





51-year-old female being seen By surgical service this morning.  The plan of 

care discussed with the patient.  Patient is to have a PICC line put in this 

morning by infectious disease recommendations for antibiotics are being 

arranged.  Surgical service patient is postop incision and drainage of a left 

axillary abscess failing outpatient treatment.  The wound culture preliminary 

MRSA.  Wound care was discussed with the nursing staff.  Patient is denying any 

chest pain shortness of breath.  Is reportedly experiencing mild incisional 

pain at the surgical site patient has remained pain-free.  In the dressing at 

the surgical site currently is dry





Objective





- Vital Signs


Vital signs: 


 Vital Signs











Temp  97.6 F   02/07/17 07:00


 


Pulse  89   02/07/17 07:00


 


Resp  20   02/07/17 07:00


 


BP  110/69   02/07/17 07:00


 


Pulse Ox  98   02/07/17 07:00








 Intake & Output











 02/06/17 02/07/17 02/07/17





 18:59 06:59 18:59


 


Intake Total 240 120 


 


Balance 240 120 


 


Weight 84.822 kg  


 


Intake:   


 


  Oral 240 120 


 


Other:   


 


  Voiding Method  Bedside Commode 


 


  # Voids 1 1 1


 


  # Bowel Movements 1  1














- Exam





Physical exam


51-year-old female sitting up in bed does not appear in any acute distress


Lungs essentially clear with adequate air movement


Heart S1-S2 audible and regular


Abdomen soft nontender reports no nausea vomiting no frequent stooling no 

abdominal pain 


extremities no evidence of bilateral pedal edema.  Dressing to the surgical 

site dry right axillary








- Labs


CBC & Chem 7: 


 02/06/17 08:25





 02/05/17 07:46


Labs: 


 Microbiology - Last 24 Hours (Table)











 02/04/17 16:00 Blood Culture - Preliminary





 Blood    No Growth after 48 hours


 


 02/04/17 16:00 Blood Culture - Preliminary





 Blood    No Growth after 48 hours


 


 02/05/17 09:33 Gram Stain - Preliminary





 Axilla - Right Wound Culture - Preliminary





    Presumptive MRSA














Assessment and Plan


Plan: 





Impression


Status post February 5 incision and drainage of a right axillary abscess


Current every day smoker chronic nicotine dependency


History of MRSA right axillary with prior abscess


Wound culture right axillary abscess positive for MRSA per wound culture


Excessive drainage from a small area in the right axillary with fluctuant area 

of abscess in the right lateral chest wall

















Plan


Continue postop surgical care as ordered


Continue recommendations by infectious disease Dr. Heard


Wound care Aquacel AG rope to the right axillary area daily


Pain control


DVT and GI prophylaxis 


patient's been advised to stop smoking cigarettes


From a surgical perspective the patient is felt to be appropriate to be 

discharged defer to the timing of the discharge to medical service


Patient will need to be followed up at the wound care center





The above dictated assessment and findings were discussed with dr hutchinson .  

Impression and the plan of care have been dictated as directed.  Sarah Kim 

nurse practitioner acting as a scribe for dr hutchinson.





Time with Patient: Greater than 30

## 2017-02-08 LAB
ANION GAP SERPL CALC-SCNC: 12 MMOL/L
BASOPHILS # BLD AUTO: 0 K/UL (ref 0–0.2)
BASOPHILS NFR BLD AUTO: 1 %
BUN SERPL-SCNC: 9 MG/DL (ref 7–17)
CALCIUM SPEC-MCNC: 8.2 MG/DL (ref 8.4–10.2)
CH: 31.5
CHCM: 31.7
CHLORIDE SERPL-SCNC: 109 MMOL/L (ref 98–107)
CO2 SERPL-SCNC: 20 MMOL/L (ref 22–30)
EOSINOPHIL # BLD AUTO: 0.1 K/UL (ref 0–0.7)
EOSINOPHIL NFR BLD AUTO: 1 %
ERYTHROCYTE [DISTWIDTH] IN BLOOD BY AUTOMATED COUNT: 3.31 M/UL (ref 3.8–5.4)
ERYTHROCYTE [DISTWIDTH] IN BLOOD: 12.9 % (ref 11.5–15.5)
GLUCOSE SERPL-MCNC: 190 MG/DL (ref 74–99)
HCT VFR BLD AUTO: 33.1 % (ref 34–46)
HDW: 2.56
HGB BLD-MCNC: 10.4 GM/DL (ref 11.4–16)
LUC NFR BLD AUTO: 3 %
LYMPHOCYTES # SPEC AUTO: 1.2 K/UL (ref 1–4.8)
LYMPHOCYTES NFR SPEC AUTO: 21 %
MAGNESIUM SPEC-SCNC: 1.9 MG/DL (ref 1.6–2.3)
MCH RBC QN AUTO: 31.3 PG (ref 25–35)
MCHC RBC AUTO-ENTMCNC: 31.3 G/DL (ref 31–37)
MCV RBC AUTO: 100 FL (ref 80–100)
MONOCYTES # BLD AUTO: 0.2 K/UL (ref 0–1)
MONOCYTES NFR BLD AUTO: 3 %
NEUTROPHILS # BLD AUTO: 4.1 K/UL (ref 1.3–7.7)
NEUTROPHILS NFR BLD AUTO: 72 %
NON-AFRICAN AMERICAN GFR(MDRD): >60
POTASSIUM SERPL-SCNC: 4 MMOL/L (ref 3.5–5.1)
SODIUM SERPL-SCNC: 141 MMOL/L (ref 137–145)
WBC # BLD AUTO: 0.14 10*3/UL
WBC # BLD AUTO: 5.7 K/UL (ref 3.8–10.6)
WBC (PEROX): 6.12

## 2017-02-08 RX ADMIN — HYDROCODONE BITARTRATE AND ACETAMINOPHEN PRN EACH: 10; 325 TABLET ORAL at 16:22

## 2017-02-08 RX ADMIN — NYSTATIN SCH UNIT: 100000 SUSPENSION ORAL at 12:24

## 2017-02-08 RX ADMIN — CEFAZOLIN SCH MLS/HR: 330 INJECTION, POWDER, FOR SOLUTION INTRAMUSCULAR; INTRAVENOUS at 21:01

## 2017-02-08 RX ADMIN — ESTROGENS, CONJUGATED SCH MG: 0.3 TABLET, FILM COATED ORAL at 08:15

## 2017-02-08 RX ADMIN — ONDANSETRON PRN MG: 2 INJECTION INTRAMUSCULAR; INTRAVENOUS at 11:13

## 2017-02-08 RX ADMIN — LISINOPRIL AND HYDROCHLOROTHIAZIDE SCH EACH: 25; 20 TABLET ORAL at 08:15

## 2017-02-08 RX ADMIN — SODIUM CHLORIDE SCH MLS/HR: 900 INJECTION, SOLUTION INTRAVENOUS at 08:14

## 2017-02-08 RX ADMIN — HYDROCODONE BITARTRATE AND ACETAMINOPHEN PRN EACH: 10; 325 TABLET ORAL at 10:09

## 2017-02-08 RX ADMIN — PREGABALIN SCH MG: 75 CAPSULE ORAL at 08:15

## 2017-02-08 RX ADMIN — FLUTICASONE PROPIONATE SCH SPRAY: 50 SPRAY, METERED NASAL at 08:15

## 2017-02-08 RX ADMIN — SODIUM CHLORIDE SCH MLS/HR: 900 INJECTION, SOLUTION INTRAVENOUS at 20:59

## 2017-02-08 RX ADMIN — PREGABALIN SCH MG: 75 CAPSULE ORAL at 00:42

## 2017-02-08 RX ADMIN — NYSTATIN SCH UNIT: 100000 SUSPENSION ORAL at 08:15

## 2017-02-08 RX ADMIN — HYDROCODONE BITARTRATE AND ACETAMINOPHEN PRN EACH: 10; 325 TABLET ORAL at 20:59

## 2017-02-08 RX ADMIN — PREGABALIN SCH MG: 75 CAPSULE ORAL at 16:24

## 2017-02-08 RX ADMIN — PANTOPRAZOLE SODIUM SCH MG: 40 TABLET, DELAYED RELEASE ORAL at 08:16

## 2017-02-08 RX ADMIN — PREGABALIN SCH MG: 75 CAPSULE ORAL at 23:29

## 2017-02-08 RX ADMIN — LEVOTHYROXINE SODIUM SCH MCG: 50 TABLET ORAL at 06:31

## 2017-02-08 RX ADMIN — VENLAFAXINE HYDROCHLORIDE SCH MG: 75 CAPSULE, EXTENDED RELEASE ORAL at 08:16

## 2017-02-08 RX ADMIN — NYSTATIN SCH UNIT: 100000 SUSPENSION ORAL at 20:59

## 2017-02-08 RX ADMIN — HYDROCODONE BITARTRATE AND ACETAMINOPHEN PRN EACH: 10; 325 TABLET ORAL at 06:31

## 2017-02-08 RX ADMIN — Medication SCH MG: at 20:59

## 2017-02-08 RX ADMIN — NYSTATIN SCH UNIT: 100000 SUSPENSION ORAL at 16:24

## 2017-02-08 RX ADMIN — HYDROCODONE BITARTRATE AND ACETAMINOPHEN PRN EACH: 10; 325 TABLET ORAL at 00:42

## 2017-02-08 NOTE — P.DS
Providers


Date of admission: 


02/03/17 20:10





Expected date of discharge: 02/08/17


Attending physician: 


Chris Carcamo





Consults: 





 





02/04/17 15:21


Consult Physician Urgent 


   Consulting Provider: Kasey Aden


   Consult Reason/Comments: right axilla abscess/cellulitis.


   Do you want consulting provider notified?: Yes











Primary care physician: 


Chris Carcamo





Hospital Course: 


Patient is a 51-year-old  female with past medical history significant 

for hypothyroidism, hypertension, anxiety, depression, admitted with chief 

complaint of left axillary abscess, failed outpatient treatment.  Patient 

underwent incision and drainage by Dr. Aden on 02/05/2017 where fluid cultures 

were obtained and were positive for MRSA. Patient did receive a PICC line 

during her hospital stay and was discharged to home on IV antibiotics for 2 

weeks per Dr. Heard's recommendations with local wound care per Dr. Aden.  

Patient improved significantly during her hospital stay and was discharged home 

in stable condition.





Discharge diagnoses:





1.  Severe sepsis secondary to right axillary abscess with cultures growing 

MRSA status post incision and drainage on 02/05/2017.  


2.  History of hypothyroidism.


3.  Fibromyalgia.


4.  Hypertension.


5.  Acute kidney injury, present on admission, suspect secondary to sepsis, 

improved.


6.  Protein calorie malnutrition, moderate.


7.  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


8.  Nicotine dependence.


9.  Oral thrush.





The above impression and plan have been discussed and directed by Dr. Carcamo. 

Lynette MCGOWAN acting as scribe for Dr. Carcamo.


Pertinent Studies: 


Chest CT





Procedures: 


Incision and drainage of axillary abscess; insertion of PICC line





Patient Condition at Discharge: Good





Plan - Discharge Summary


New Discharge Prescriptions: 


Ceftaroline Fosamil [Teflaro] 600 mg IV Q12H 24 Days


Nystatin 100,000 Unit/ml Susp [Mycostatin Oral Susp] 500,000 unit PO QID #28 cup


Discharge Medication List





Lisinopril-Hctz 20-25 mg [Zestoretic 20-25] 1 tab PO DAILY 08/12/15 [History]


HYDROcodone/APAP 10-325MG [Norco ] 1 tab PO Q4H PRN 11/24/15 [History]


Pregabalin [Lyrica] 150 mg PO Q8H #90 capsule 01/27/16 [Rx]


Estrogens, Conjugated [Premarin] 0.3 mg PO DAILY 06/09/16 [History]


ARIPiprazole [Abilify] 20 mg PO DAILY 09/28/16 [History]


Venlafaxine HCl [Effexor XR] 225 mg PO DAILY 09/28/16 [History]


ARIPiprazole [Abilify] 5 mg PO DAILY 02/03/17 [History]


Baclofen 10 - 20 mg PO HS PRN 02/03/17 [History]


Dextroamphetamine/Amphetamine [Adderall] 30 mg PO TID 02/03/17 [History]


Fluticasone Nasal Spray [Flonase Nasal Spray] 2 spray EA NOSTRIL DAILY 02/03/17 

[History]


Levothyroxine Sodium [Synthroid] 50 mcg PO DAILY 02/03/17 [History]


Pantoprazole [Protonix] 40 mg PO DAILY 02/03/17 [History]


Ceftaroline Fosamil [Teflaro] 600 mg IV Q12H 24 Days 02/08/17 [Rx]


Ceftaroline Fosamil [Teflaro] 600 mg IVPB Q12HR  vial 02/08/17 [Rx]


Nystatin 100,000 Unit/ml Susp [Mycostatin Oral Susp] 500,000 unit PO QID #28 

cup 02/08/17 [Rx]








Follow up Appointment(s)/Referral(s): 


Kasey Aden MD [STAFF PHYSICIAN] - 02/15/17 1:30 pm


Chris Carcamo DO [Primary Care Provider] - 3 Days (Office closed for lunch, 

please call for appointment. )


[facility], [NON-STAFF] - 


[facility], [REFERRING] - 


Roger Heard MD [STAFF PHYSICIAN] - 02/16/17 11:00 am


Ambulatory/Diagnostic Orders: 


Basic Metabolic Panel [LAB.AMB] Time Frame: 02/15/17, Location: Determined By 

Patient


Miscellaneous Lab Order [LAB.AMB] Time Frame: 02/15/17, Location: Determined By 

Patient


Patient Instructions/Handouts:  Acute Wound Care (DC)


Activity/Diet/Wound Care/Special Instructions: 


WOUND CARE- DAILY- Aquacel Silver ropes as needed (4-5), covered with 4x4 and 

ABD. (dressings ordered from [facility] #[phone]) and they will be 

shipped to patient's home in 3 days





MRSA precautions





PICC line instructions given





Regular diet. 








Discharge Disposition: HOME WITH HOME HEALTH SERVICES

## 2017-02-08 NOTE — PN
DATE OF SERVICE: 02/08/2017



REASON FOR FOLLOWUP: Right axillary MRSA abscess and cellulitis. 



INTERVAL HISTORY: The patient is afebrile. She has been breathing 

comfortably. Pain to the right axillary area has improved. Patient 

denies having any chest pain or shortness of breath or cough. No 

abdominal pain or any diarrhea. She is waiting for insurance approval 

for Teflaro as an outpatient. On examination, blood pressure is 103/59 

with a pulse of 90, temperature of 99.3. She is 98% on room air. 

General description is a middle-aged female up in the bed in no 

distress.  

RESPIRATORY SYSTEM: Unlabored breathing. Clear to auscultation 

anteriorly.  

HEART: S1, S2. Regular rate and rhythm. 

ABDOMEN: Soft. No tenderness. 



LABS: Hemoglobin is 10.4, white count 5.7. BUN of 9, creatinine 0.63. 



DIAGNOSTIC IMPRESSION AND PLAN: Patient with methicillin-resistant 

Staphylococcus aureus right axillary abscess, status post drainage. 

Blood culture has been negative. At this point the patient will 

continue on Teflaro 600 IV q.12 for a total of 2 weeks with outpatient 

followup, Aquacel Silver packing of the wound. Continue supportive 

care.

## 2017-02-09 VITALS
TEMPERATURE: 97.1 F | RESPIRATION RATE: 17 BRPM | SYSTOLIC BLOOD PRESSURE: 126 MMHG | DIASTOLIC BLOOD PRESSURE: 77 MMHG | HEART RATE: 86 BPM

## 2017-02-09 RX ADMIN — ONDANSETRON PRN MG: 2 INJECTION INTRAMUSCULAR; INTRAVENOUS at 11:36

## 2017-02-09 RX ADMIN — PANTOPRAZOLE SODIUM SCH MG: 40 TABLET, DELAYED RELEASE ORAL at 07:54

## 2017-02-09 RX ADMIN — ESTROGENS, CONJUGATED SCH MG: 0.3 TABLET, FILM COATED ORAL at 07:56

## 2017-02-09 RX ADMIN — LEVOTHYROXINE SODIUM SCH MCG: 50 TABLET ORAL at 06:33

## 2017-02-09 RX ADMIN — HYDROCODONE BITARTRATE AND ACETAMINOPHEN PRN EACH: 10; 325 TABLET ORAL at 01:33

## 2017-02-09 RX ADMIN — CEFAZOLIN SCH: 330 INJECTION, POWDER, FOR SOLUTION INTRAMUSCULAR; INTRAVENOUS at 15:40

## 2017-02-09 RX ADMIN — LISINOPRIL AND HYDROCHLOROTHIAZIDE SCH EACH: 25; 20 TABLET ORAL at 07:56

## 2017-02-09 RX ADMIN — SODIUM CHLORIDE SCH MLS/HR: 900 INJECTION, SOLUTION INTRAVENOUS at 07:56

## 2017-02-09 RX ADMIN — NYSTATIN SCH UNIT: 100000 SUSPENSION ORAL at 07:57

## 2017-02-09 RX ADMIN — HYDROCODONE BITARTRATE AND ACETAMINOPHEN PRN EACH: 10; 325 TABLET ORAL at 10:54

## 2017-02-09 RX ADMIN — PREGABALIN SCH MG: 75 CAPSULE ORAL at 07:54

## 2017-02-09 RX ADMIN — HYDROCODONE BITARTRATE AND ACETAMINOPHEN PRN EACH: 10; 325 TABLET ORAL at 06:33

## 2017-02-09 RX ADMIN — VENLAFAXINE HYDROCHLORIDE SCH MG: 75 CAPSULE, EXTENDED RELEASE ORAL at 07:57

## 2017-02-09 RX ADMIN — NYSTATIN SCH UNIT: 100000 SUSPENSION ORAL at 14:23

## 2017-02-09 RX ADMIN — FLUTICASONE PROPIONATE SCH: 50 SPRAY, METERED NASAL at 07:56

## 2017-02-09 RX ADMIN — HYDROCODONE BITARTRATE AND ACETAMINOPHEN PRN EACH: 10; 325 TABLET ORAL at 15:07

## 2017-02-09 NOTE — PN
DATE OF SERVICE:  02/09/2017



Reason for followup is right axillae MRSA abscess. 



INTERVAL HISTORY: The patient is afebrile, has been breathing 

comfortably. Denies any significant chest pain or shortness of breath. 

 No cough, no abdominal pain or any diarrhea. 



On examination, blood pressure is 126/77 with a pulse of 86, 

temperature 97.1, she is 96% on room air. General description is a 

middle-age female, lying in bed in no distress.  

RESPIRATORY SYSTEM: Unlabored breathing. Clear to auscultation 

anteriorly.  

HEART: S1, S2. Regular rate and rhythm.

ABDOMEN:  Soft, no tenderness.



LABS: Hemoglobin is 10.4, white count is 5.7 with a BUN of 9, 

creatinine 0.63. Blood culture negative.  



DIAGNOSTIC IMPRESSION AND PLAN: Patient with methicillin-resistant 

Staphylococcus aureus right axillary abscess, status post drainage of 

the same.  They should be waiting for the antibiotics arrangement before 

she scan be discharged.  She has failed oral Bactrim and the 

vancomycin DAWOOD was 2 for the Staphylococcus aureus with sepsis 

for possible failure.  with outpatient followup.  Continue 

supportive care.   



VITALY

## 2017-03-01 ENCOUNTER — HOSPITAL ENCOUNTER (INPATIENT)
Dept: HOSPITAL 47 - EC | Age: 52
LOS: 6 days | Discharge: HOME HEALTH | DRG: 603 | End: 2017-03-07
Payer: COMMERCIAL

## 2017-03-01 DIAGNOSIS — E86.0: ICD-10-CM

## 2017-03-01 DIAGNOSIS — F32.9: ICD-10-CM

## 2017-03-01 DIAGNOSIS — M79.7: ICD-10-CM

## 2017-03-01 DIAGNOSIS — Z79.899: ICD-10-CM

## 2017-03-01 DIAGNOSIS — Z87.891: ICD-10-CM

## 2017-03-01 DIAGNOSIS — L02.411: Primary | ICD-10-CM

## 2017-03-01 DIAGNOSIS — R39.11: ICD-10-CM

## 2017-03-01 DIAGNOSIS — K21.9: ICD-10-CM

## 2017-03-01 DIAGNOSIS — N17.9: ICD-10-CM

## 2017-03-01 DIAGNOSIS — K59.00: ICD-10-CM

## 2017-03-01 DIAGNOSIS — Z86.14: ICD-10-CM

## 2017-03-01 DIAGNOSIS — L03.111: ICD-10-CM

## 2017-03-01 DIAGNOSIS — F90.9: ICD-10-CM

## 2017-03-01 DIAGNOSIS — G43.909: ICD-10-CM

## 2017-03-01 DIAGNOSIS — B95.62: ICD-10-CM

## 2017-03-01 DIAGNOSIS — E66.9: ICD-10-CM

## 2017-03-01 DIAGNOSIS — E03.9: ICD-10-CM

## 2017-03-01 DIAGNOSIS — G90.2: ICD-10-CM

## 2017-03-01 DIAGNOSIS — I10: ICD-10-CM

## 2017-03-01 DIAGNOSIS — E87.6: ICD-10-CM

## 2017-03-01 DIAGNOSIS — F41.9: ICD-10-CM

## 2017-03-01 LAB
ALP SERPL-CCNC: 94 U/L (ref 38–126)
ALT SERPL-CCNC: 28 U/L (ref 9–52)
ANION GAP SERPL CALC-SCNC: 10 MMOL/L
AST SERPL-CCNC: 21 U/L (ref 14–36)
BASOPHILS # BLD AUTO: 0.1 K/UL (ref 0–0.2)
BASOPHILS NFR BLD AUTO: 1 %
BUN SERPL-SCNC: 23 MG/DL (ref 7–17)
CALCIUM SPEC-MCNC: 8.8 MG/DL (ref 8.4–10.2)
CH: 32.4
CHCM: 33.4
CHLORIDE SERPL-SCNC: 106 MMOL/L (ref 98–107)
CO2 SERPL-SCNC: 23 MMOL/L (ref 22–30)
EOSINOPHIL # BLD AUTO: 0.2 K/UL (ref 0–0.7)
EOSINOPHIL NFR BLD AUTO: 3 %
ERYTHROCYTE [DISTWIDTH] IN BLOOD BY AUTOMATED COUNT: 3.54 M/UL (ref 3.8–5.4)
ERYTHROCYTE [DISTWIDTH] IN BLOOD: 14.6 % (ref 11.5–15.5)
GLUCOSE SERPL-MCNC: 91 MG/DL (ref 74–99)
HCT VFR BLD AUTO: 34.7 % (ref 34–46)
HDW: 2.89
HGB BLD-MCNC: 11.5 GM/DL (ref 11.4–16)
LUC NFR BLD AUTO: 4 %
LYMPHOCYTES # SPEC AUTO: 2 K/UL (ref 1–4.8)
LYMPHOCYTES NFR SPEC AUTO: 30 %
MCH RBC QN AUTO: 32.5 PG (ref 25–35)
MCHC RBC AUTO-ENTMCNC: 33.2 G/DL (ref 31–37)
MCV RBC AUTO: 97.8 FL (ref 80–100)
MONOCYTES # BLD AUTO: 0.3 K/UL (ref 0–1)
MONOCYTES NFR BLD AUTO: 5 %
NEUTROPHILS # BLD AUTO: 3.8 K/UL (ref 1.3–7.7)
NEUTROPHILS NFR BLD AUTO: 57 %
NON-AFRICAN AMERICAN GFR(MDRD): 59
POTASSIUM SERPL-SCNC: 3.3 MMOL/L (ref 3.5–5.1)
PROT SERPL-MCNC: 6.2 G/DL (ref 6.3–8.2)
SODIUM SERPL-SCNC: 139 MMOL/L (ref 137–145)
WBC # BLD AUTO: 0.27 10*3/UL
WBC # BLD AUTO: 6.6 K/UL (ref 3.8–10.6)
WBC (PEROX): 6.89

## 2017-03-01 PROCEDURE — 80048 BASIC METABOLIC PNL TOTAL CA: CPT

## 2017-03-01 PROCEDURE — 87086 URINE CULTURE/COLONY COUNT: CPT

## 2017-03-01 PROCEDURE — 96374 THER/PROPH/DIAG INJ IV PUSH: CPT

## 2017-03-01 PROCEDURE — 87040 BLOOD CULTURE FOR BACTERIA: CPT

## 2017-03-01 PROCEDURE — 71010: CPT

## 2017-03-01 PROCEDURE — 80053 COMPREHEN METABOLIC PANEL: CPT

## 2017-03-01 PROCEDURE — 86140 C-REACTIVE PROTEIN: CPT

## 2017-03-01 PROCEDURE — 84134 ASSAY OF PREALBUMIN: CPT

## 2017-03-01 PROCEDURE — 81001 URINALYSIS AUTO W/SCOPE: CPT

## 2017-03-01 PROCEDURE — 36415 COLL VENOUS BLD VENIPUNCTURE: CPT

## 2017-03-01 PROCEDURE — 96361 HYDRATE IV INFUSION ADD-ON: CPT

## 2017-03-01 PROCEDURE — 96375 TX/PRO/DX INJ NEW DRUG ADDON: CPT

## 2017-03-01 PROCEDURE — 83880 ASSAY OF NATRIURETIC PEPTIDE: CPT

## 2017-03-01 PROCEDURE — 87205 SMEAR GRAM STAIN: CPT

## 2017-03-01 PROCEDURE — 71260 CT THORAX DX C+: CPT

## 2017-03-01 PROCEDURE — 85025 COMPLETE CBC W/AUTO DIFF WBC: CPT

## 2017-03-01 PROCEDURE — 87070 CULTURE OTHR SPECIMN AEROBIC: CPT

## 2017-03-01 PROCEDURE — 99285 EMERGENCY DEPT VISIT HI MDM: CPT

## 2017-03-01 RX ADMIN — HYDROCODONE BITARTRATE AND ACETAMINOPHEN PRN EACH: 10; 325 TABLET ORAL at 22:50

## 2017-03-01 RX ADMIN — PREGABALIN SCH MG: 75 CAPSULE ORAL at 23:46

## 2017-03-01 RX ADMIN — HYDROMORPHONE HYDROCHLORIDE PRN MG: 1 INJECTION, SOLUTION INTRAMUSCULAR; INTRAVENOUS; SUBCUTANEOUS at 23:48

## 2017-03-01 RX ADMIN — SODIUM CHLORIDE SCH MLS/HR: 900 INJECTION, SOLUTION INTRAVENOUS at 22:16

## 2017-03-01 RX ADMIN — HYDROMORPHONE HYDROCHLORIDE PRN MG: 1 INJECTION, SOLUTION INTRAMUSCULAR; INTRAVENOUS; SUBCUTANEOUS at 20:32

## 2017-03-01 NOTE — ED
General Adult HPI





<Ba Schultz - Last Filed: 03/01/17 18:11>





- General


Source: patient, RN notes reviewed


Mode of arrival: ambulatory


Limitations: no limitations





<Louie Charles - Last Filed: 03/01/17 18:13>





- General


Chief complaint: Skin/Abscess/Foreign Body


Stated complaint: infection under arm-sent by 


Time Seen by Provider: 03/01/17 15:54





- History of Present Illness


Initial comments: 





51-year-old female presents emergency Department chief complaint right arm, 

chest wall abscess.  Patient states that this started a few weeks ago and was 

admitted the hospital head I&D by Dr. Diamond.  Patient has been on IV ceftaroline 

twice daily.  Patient has visiting nurse come change the packing.  Patient 

symptoms are getting worse she's had increased pain, swelling of her lower 

extremity.  She states that she started having this when her tonsils worse.  

Patient states she's been having on-and-off fever, chills.  Patient denies any 

shortness of breath this time.  Patient states that she is on pain medication 

though it's not helping.  Patient states that they did remove the packing today 

and which she called her doctor sent her here for admission. (Louie Charles)





- Related Data


 Home Medications











 Medication  Instructions  Recorded  Confirmed


 


Lisinopril-Hctz 20-25 mg 1 tab PO DAILY 08/12/15 03/01/17





[Zestoretic 20-25]   


 


HYDROcodone/APAP 10-325MG [Norco 1 tab PO Q4H PRN 11/24/15 03/01/17





]   


 


Estrogens, Conjugated [Premarin] 0.3 mg PO DAILY 06/09/16 03/01/17


 


ARIPiprazole [Abilify] 20 mg PO DAILY 09/28/16 03/01/17


 


Venlafaxine HCl [Effexor XR] 225 mg PO DAILY 09/28/16 03/01/17


 


ARIPiprazole [Abilify] 5 mg PO DAILY 02/03/17 03/01/17


 


Baclofen 10 - 20 mg PO HS PRN 02/03/17 03/01/17


 


Dextroamphetamine/Amphetamine 30 mg PO TID 02/03/17 03/01/17





[Adderall]   


 


Fluticasone Nasal Spray [Flonase 2 spray EA NOSTRIL BID 02/03/17 03/01/17





Nasal Spray]   


 


Levothyroxine Sodium [Synthroid] 50 mcg PO DAILY 02/03/17 03/01/17


 


Pantoprazole [Protonix] 40 mg PO DAILY 02/03/17 03/01/17








 Previous Rx's











 Medication  Instructions  Recorded


 


Pregabalin [Lyrica] 150 mg PO Q8H #90 capsule 01/27/16


 


Ceftaroline Fosamil [Teflaro] 600 mg IV Q12H 24 Days 02/08/17


 


Nystatin 100,000 Unit/ml Susp 500,000 unit PO QID #28 cup 02/08/17





[Mycostatin Oral Susp]  











 Allergies











Allergy/AdvReac Type Severity Reaction Status Date / Time


 


No Known Allergies Allergy   Verified 03/01/17 16:17














Review of Systems


ROS Other: All systems not noted in ROS Statement are negative.





<Ba Schultz - Last Filed: 03/01/17 18:11>


ROS Other: All systems not noted in ROS Statement are negative.





<Louie Charles - Last Filed: 03/01/17 18:13>


ROS Statement: 


Those systems with pertinent positive or pertinent negative responses have been 

documented in the HPI.








Past Medical History


Past Medical History: Eye Disorder, Fibromyalgia, Hypertension, Pneumonia, 

Thyroid Disorder


Additional Past Medical History / Comment(s): horners syndrom rt eye, 

HYPOGLYCEMIA, ANEMIA, MIGRAINES


History of Any Multi-Drug Resistant Organisms: MRSA


Date of last positivie culture/infection: 9/28/16


MDRO Source:: ANUS


Past Surgical History: Bariatric Surgery, Cholecystectomy, Hernia Repair, 

Hysterectomy, Orthopedic Surgery


Additional Past Surgical History / Comment(s): tummy tuck; prev. trach. foot 

surgery ( right side took out extra toe ), left foot surgery, TRACHEOSTOMY/ AND 

REMOVED


Past Anesthesia/Blood Transfusion Reactions: No Reported Reaction


Past Psychological History: ADD/ADHD, Anxiety, Depression


Smoking Status: Former smoker


Past Alcohol Use History: None Reported


Additional Past Alcohol Use History / Comment(s): started smoking 1983, smoked 

1 PPD; pt states she quit 1/15/17


Past Drug Use History: None Reported





- Past Family History


  ** Father


Family Medical History: Cancer





<Louie Charles - Last Filed: 03/01/17 18:13>





General Exam


Limitations: no limitations


General appearance: alert, in no apparent distress


Head exam: Present: atraumatic, normocephalic, normal inspection


Neck exam: Present: normal inspection, full ROM.  Absent: tenderness, 

meningismus, lymphadenopathy


Respiratory exam: Present: normal lung sounds bilaterally.  Absent: respiratory 

distress, wheezes, rales, rhonchi, stridor


Cardiovascular Exam: Present: regular rate, normal rhythm, normal heart sounds.

  Absent: systolic murmur, diastolic murmur, rubs, gallop, clicks


Skin exam: Present: warm, dry, other (Right axilla, lateral chest wall region 

there are 3 open wounds noted greatest measuring 3 cm in diameter with some 

purulent drainage area severely tender with palpation minimal erythema)





<Louei Charles - Last Filed: 03/01/17 18:13>





Medical Decision Making





- Lab Data


Result diagrams: 


 03/01/17 16:30





 03/01/17 16:30





<Ba Schultz - Last Filed: 03/01/17 18:11>





- Lab Data


Result diagrams: 


 03/01/17 16:30





 03/01/17 16:30





<Louie Charles - Last Filed: 03/01/17 18:13>





- Medical Decision Making





I spoke with Dr. Carcamo concerning the patient's current CAT scan with 

subcutaneous emphysema but no phlegmon.  Wants the patient admitted to his 

service with consultation from Dr. Abdul.  Dr. Schultz (Ba Schultz)





- Lab Data


 Lab Results











  03/01/17 03/01/17 03/01/17 Range/Units





  16:30 16:30 16:30 


 


WBC  6.6    (3.8-10.6)  k/uL


 


RBC  3.54 L    (3.80-5.40)  m/uL


 


Hgb  11.5    (11.4-16.0)  gm/dL


 


Hct  34.7    (34.0-46.0)  %


 


MCV  97.8    (80.0-100.0)  fL


 


MCH  32.5    (25.0-35.0)  pg


 


MCHC  33.2    (31.0-37.0)  g/dL


 


RDW  14.6    (11.5-15.5)  %


 


Plt Count  271    (150-450)  k/uL


 


Neutrophils %  57    %


 


Lymphocytes %  30    %


 


Monocytes %  5    %


 


Eosinophils %  3    %


 


Basophils %  1    %


 


Neutrophils #  3.8    (1.3-7.7)  k/uL


 


Lymphocytes #  2.0    (1.0-4.8)  k/uL


 


Monocytes #  0.3    (0-1.0)  k/uL


 


Eosinophils #  0.2    (0-0.7)  k/uL


 


Basophils #  0.1    (0-0.2)  k/uL


 


Sodium   139   (137-145)  mmol/L


 


Potassium   3.3 L   (3.5-5.1)  mmol/L


 


Chloride   106   ()  mmol/L


 


Carbon Dioxide   23   (22-30)  mmol/L


 


Anion Gap   10   mmol/L


 


BUN   23 H   (7-17)  mg/dL


 


Creatinine   1.00   (0.52-1.04)  mg/dL


 


Est GFR (MDRD) Af Amer   >60   (>60 ml/min/1.73 sqM)  


 


Est GFR (MDRD) Non-Af   59   (>60 ml/min/1.73 sqM)  


 


Glucose   91   (74-99)  mg/dL


 


Calcium   8.8   (8.4-10.2)  mg/dL


 


Total Bilirubin   0.2   (0.2-1.3)  mg/dL


 


AST   21   (14-36)  U/L


 


ALT   28   (9-52)  U/L


 


Alkaline Phosphatase   94   ()  U/L


 


C-Reactive Protein   29.7 H   (<10.0)  mg/L


 


NT-Pro-B Natriuret Pep    71  pg/mL


 


Total Protein   6.2 L   (6.3-8.2)  g/dL


 


Albumin   3.5   (3.5-5.0)  g/dL














Disposition





<Ba Schultz - Last Filed: 03/01/17 18:11>





<Louie Charles - Last Filed: 03/01/17 18:13>


Clinical Impression: 


 Cellulitis due to MRSA, Abscess of right axilla, Failure of outpatient 

treatment





Disposition: ADMITTED AS IP TO THIS HOSP

## 2017-03-01 NOTE — XR
EXAMINATION TYPE: XR chest 1V

 

DATE OF EXAM: 3/1/2017 4:44 PM

 

COMPARISON: August 12, 2015

 

HISTORY: Chest pain

 

TECHNIQUE: Single frontal view of the chest is obtained.

 

FINDINGS:  

There is no focal air space opacity, pleural effusion, or pneumothorax seen.  Left-sided PICC line is
 in place with its distal tip overlying the SVC.

The cardiac silhouette size is within normal limits.   

The osseous structures are intact.

 

IMPRESSION:  

1.  No acute process.

## 2017-03-01 NOTE — CT
EXAMINATION TYPE: CT chest w con

 

DATE OF EXAM: 3/1/2017 5:13 PM

 

COMPARISON: CT February 4, 2017

 

HISTORY: Pain and swelling to right axilla.

 

CT DLP: 622.00 mGycm

Automated exposure control for dose reduction was used.

 

CONTRAST: 

CT scan of the chest is performed with IV Contrast, patient injected with 100 mL of Omnipaque 300.

 

FINDINGS: The soft tissue emphysematous changes in the right axilla are similar when compared the zhane
or study. However, the overall edematous change throughout the right axilla has improved in the inter
im. There are no drainable fluid collections.

 

Vasculature and skeletal structures are unremarkable. 

 

The lungs are clear, and the pleural spaces are negative. Large hiatal hernia redemonstrated. Mediast
inum and visualized subdiaphragmatic structures otherwise unremarkable.

 

IMPRESSION:  Interval improvement in the right axillary cellulitis/phlegmon pattern when compared to 
the February 4, 2017 CT. No drainable fluid collection.

## 2017-03-02 LAB
ANION GAP SERPL CALC-SCNC: 9 MMOL/L
BUN SERPL-SCNC: 17 MG/DL (ref 7–17)
CALCIUM SPEC-MCNC: 8.8 MG/DL (ref 8.4–10.2)
CHLORIDE SERPL-SCNC: 109 MMOL/L (ref 98–107)
CO2 SERPL-SCNC: 22 MMOL/L (ref 22–30)
GLUCOSE SERPL-MCNC: 112 MG/DL (ref 74–99)
NON-AFRICAN AMERICAN GFR(MDRD): >60
PARTICLE COUNT: 1641
PH UR: 6 [PH] (ref 5–8)
POTASSIUM SERPL-SCNC: 3.3 MMOL/L (ref 3.5–5.1)
RBC UR QL: 8 /HPF (ref 0–5)
SODIUM SERPL-SCNC: 140 MMOL/L (ref 137–145)
SP GR UR: 1.01 (ref 1–1.03)
SQUAMOUS UR QL AUTO: 5 /HPF (ref 0–4)
UA BILLING (MACRO VS. MICRO): (no result)
UROBILINOGEN UR QL STRIP: <2 MG/DL (ref ?–2)
WBC #/AREA URNS HPF: 2 /HPF (ref 0–5)

## 2017-03-02 RX ADMIN — DOCUSATE SODIUM SCH MG: 100 CAPSULE, LIQUID FILLED ORAL at 20:52

## 2017-03-02 RX ADMIN — SODIUM CHLORIDE SCH MLS/HR: 900 INJECTION, SOLUTION INTRAVENOUS at 09:47

## 2017-03-02 RX ADMIN — PREGABALIN SCH MG: 75 CAPSULE ORAL at 09:47

## 2017-03-02 RX ADMIN — HYDROMORPHONE HYDROCHLORIDE PRN MG: 1 INJECTION, SOLUTION INTRAMUSCULAR; INTRAVENOUS; SUBCUTANEOUS at 08:33

## 2017-03-02 RX ADMIN — LISINOPRIL AND HYDROCHLOROTHIAZIDE SCH EACH: 25; 20 TABLET ORAL at 09:48

## 2017-03-02 RX ADMIN — VENLAFAXINE HYDROCHLORIDE SCH MG: 75 CAPSULE, EXTENDED RELEASE ORAL at 09:48

## 2017-03-02 RX ADMIN — DOCUSATE SODIUM SCH MG: 100 CAPSULE, LIQUID FILLED ORAL at 13:07

## 2017-03-02 RX ADMIN — PREGABALIN SCH MG: 75 CAPSULE ORAL at 16:08

## 2017-03-02 RX ADMIN — HYDROMORPHONE HYDROCHLORIDE PRN MG: 1 INJECTION, SOLUTION INTRAMUSCULAR; INTRAVENOUS; SUBCUTANEOUS at 12:55

## 2017-03-02 RX ADMIN — ESTROGENS, CONJUGATED SCH MG: 0.3 TABLET, FILM COATED ORAL at 09:47

## 2017-03-02 RX ADMIN — HYDROMORPHONE HYDROCHLORIDE PRN MG: 1 INJECTION, SOLUTION INTRAMUSCULAR; INTRAVENOUS; SUBCUTANEOUS at 18:58

## 2017-03-02 RX ADMIN — SODIUM CHLORIDE SCH MLS/HR: 900 INJECTION, SOLUTION INTRAVENOUS at 23:05

## 2017-03-02 RX ADMIN — LEVOTHYROXINE SODIUM SCH MCG: 50 TABLET ORAL at 06:21

## 2017-03-02 RX ADMIN — POTASSIUM CHLORIDE SCH MEQ: 20 TABLET, EXTENDED RELEASE ORAL at 14:12

## 2017-03-02 RX ADMIN — POTASSIUM CHLORIDE SCH MEQ: 20 TABLET, EXTENDED RELEASE ORAL at 13:07

## 2017-03-02 RX ADMIN — HYDROMORPHONE HYDROCHLORIDE PRN MG: 1 INJECTION, SOLUTION INTRAMUSCULAR; INTRAVENOUS; SUBCUTANEOUS at 22:06

## 2017-03-02 RX ADMIN — FLUTICASONE PROPIONATE SCH SPRAY: 50 SPRAY, METERED NASAL at 20:27

## 2017-03-02 RX ADMIN — HYDROMORPHONE HYDROCHLORIDE PRN MG: 1 INJECTION, SOLUTION INTRAMUSCULAR; INTRAVENOUS; SUBCUTANEOUS at 05:16

## 2017-03-02 RX ADMIN — HYDROCODONE BITARTRATE AND ACETAMINOPHEN PRN EACH: 10; 325 TABLET ORAL at 20:33

## 2017-03-02 RX ADMIN — PREGABALIN SCH MG: 75 CAPSULE ORAL at 23:05

## 2017-03-02 RX ADMIN — HYDROMORPHONE HYDROCHLORIDE PRN MG: 1 INJECTION, SOLUTION INTRAMUSCULAR; INTRAVENOUS; SUBCUTANEOUS at 16:08

## 2017-03-02 RX ADMIN — PANTOPRAZOLE SODIUM SCH MG: 40 TABLET, DELAYED RELEASE ORAL at 09:47

## 2017-03-02 NOTE — P.HPIM
History of Present Illness


H&P Date: 03/02/17


Chief Complaint: Abscess right axilla


Patient is a 51-year-old  female with past medical history significant 

for recent hospitalization for severe sepsis secondary to right axillary 

abscess status post incision and drainage on 02/05/2017 by Dr. Aden with fluid 

cultures positive for MRSA discharged home on 02/08/2017 with a PICC line and 

IV Teraflo for 24 weeks with local wound care per Dr. Heard from infectious 

disease service.  Patient presented to the emergency department with multiple 

complaints including on and off fevers, chills, swelling of her lower 

extremities, and increased incisional pain.  Upon examination, patient 

complains of feeling constipated, and urinary hesitancy.  Patient denies 

shortness of breath, nausea, vomiting, chest pain, no abdominal pain.  Patient 

denies numbness or tingling.  Patient reports good appetite.  CT of chest upon 

admission shows interval improvement in the right axillary cellulitis/phlegmon 

pattern when compared to previous exam on February 4.  No drainable fluid 

collection identified.  Labs reviewed with a potassium of 3.3, C-reactive 

protein 29.7 decreased from 41.8 on 02/27/2017.  Urinalysis with small amount 

of blood.  No evidence of fevers.  Blood pressure within normal limits.








Past Medical History


Past Medical History: Eye Disorder, Fibromyalgia, Hypertension, Pneumonia, 

Thyroid Disorder


Additional Past Medical History / Comment(s): horners syndrom rt eye, 

HYPOGLYCEMIA, ANEMIA, MIGRAINES


History of Any Multi-Drug Resistant Organisms: MRSA


Date of last positivie culture/infection: 9/28/16


MDRO Source:: ANUS


Past Surgical History: Bariatric Surgery, Cholecystectomy, Hernia Repair, 

Hysterectomy, Orthopedic Surgery


Additional Past Surgical History / Comment(s): tummy tuck; prev. trach. foot 

surgery ( right side took out extra toe ), left foot surgery, TRACHEOSTOMY/ AND 

REMOVED


Past Anesthesia/Blood Transfusion Reactions: No Reported Reaction


Past Psychological History: ADD/ADHD, Anxiety, Depression


Smoking Status: Former smoker


Past Alcohol Use History: None Reported


Additional Past Alcohol Use History / Comment(s): started smoking 1983, smoked 

1 PPD; pt states she quit 1/15/17


Past Drug Use History: None Reported





- Past Family History


  ** Father


Family Medical History: Cancer





Medications and Allergies


 Home Medications











 Medication  Instructions  Recorded  Confirmed  Type


 


Lisinopril-Hctz 20-25 mg 1 tab PO DAILY 08/12/15 03/01/17 History





[Zestoretic 20-25]    


 


HYDROcodone/APAP 10-325MG [Norco 1 tab PO Q4H PRN 11/24/15 03/01/17 History





]    


 


Estrogens, Conjugated [Premarin] 0.3 mg PO DAILY 06/09/16 03/01/17 History


 


ARIPiprazole [Abilify] 20 mg PO DAILY 09/28/16 03/01/17 History


 


Venlafaxine HCl [Effexor XR] 225 mg PO DAILY 09/28/16 03/01/17 History


 


ARIPiprazole [Abilify] 5 mg PO DAILY 02/03/17 03/01/17 History


 


Baclofen 10 - 20 mg PO HS PRN 02/03/17 03/01/17 History


 


Dextroamphetamine/Amphetamine 30 mg PO TID 02/03/17 03/01/17 History





[Adderall]    


 


Fluticasone Nasal Spray [Flonase 2 spray EA NOSTRIL BID 02/03/17 03/01/17 

History





Nasal Spray]    


 


Levothyroxine Sodium [Synthroid] 50 mcg PO DAILY 02/03/17 03/01/17 History


 


Pantoprazole [Protonix] 40 mg PO DAILY 02/03/17 03/01/17 History











 Allergies











Allergy/AdvReac Type Severity Reaction Status Date / Time


 


No Known Allergies Allergy   Verified 03/01/17 16:17














Physical Exam


Vitals: 


 Vital Signs











  Temp Pulse Pulse Resp BP BP Pulse Ox


 


 03/02/17 07:00  97 F L   83  20   120/77  92 L


 


 03/01/17 23:00  97.0 F L   75  19   112/79  96


 


 03/01/17 20:40  98.4 F  78   18  113/75   98


 


 03/01/17 19:02  97.9 F  85   18  100/74   97








 Intake and Output











 03/01/17 03/02/17 03/02/17





 22:59 06:59 14:59


 


Intake Total  240 


 


Balance  240 


 


Intake:   


 


  Oral  240 


 


Other:   


 


  Voiding Method  Toilet Toilet











GENERAL: Pt awake and alert, well-appearing, well-nourished, and in no acute 

distress.


HEAD: Atraumatic, normocephalic.


EYES: Pupils equal, round, and reactive to light, extraocular movements intact, 

sclera anicteric, conjunctiva are normal.


ENT: Oropharynx clear without exudates.  Moist mucous membranes.


NECK: Supple without lymphadenopathy or JVD. 


LUNGS: Breath sounds clear to auscultation bilaterally.  No wheezes, rales, or 

rhonchi.


HEART: Heart S1, S2, no S3 or S4.  Regular rate and rhythm.  No murmurs, rubs 

or gallops.


ABDOMEN: Soft, mild suprapubic tenderness, nondistended, normoactive bowel 

sounds.  No guarding, no rebound.  No masses or organomegaly appreciated. 


EXTREMITIES: Palpable pulses.  Trace edema to bilateral lower extremities. No 

calf tenderness.


NEUROLOGICAL: Pt oriented x 3.  No focal deficits.  Strength and sensation 

grossly intact.


PSYCH: Normal mood, normal affect.


SKIN: Warm and dry.  3 open wounds noted to right axilla with some purulent 

drainage, tender to touch, mild erythema, some purulent drainage noted.








Results


CBC & Chem 7: 


 03/01/17 16:30





 03/02/17 09:17


Labs: 


 Abnormal Lab Results - Last 24 Hours (Table)











  03/02/17 03/02/17 Range/Units





  09:17 11:54 


 


Potassium  3.3 L   (3.5-5.1)  mmol/L


 


Chloride  109 H   ()  mmol/L


 


Glucose  112 H   (74-99)  mg/dL


 


Urine Blood   Small H  (Negative)  


 


Urine RBC   8 H  (0-5)  /hpf


 


Ur Squamous Epith Cells   5 H  (0-4)  /hpf


 


Urine Mucus   Rare H  (None)  /hpf











Chest x-ray: report reviewed


CT scan - abdomen: report reviewed


CT scan - pelvis: report reviewed





Thrombosis Risk Factor Assmnt





- DVT/VTE Prophylaxis


DVT/VTE Prophylaxis: Pharmacologic Prophylaxis ordered, Mechanical Prophylaxis 

ordered





- Choose All That Apply


Any of the Below Risk Factors Present?: Yes


Each Factor Represents 1 point: Age 41-60 years, Obesity (BMI >25), Swollen 

legs (current)


Other Risk Factors: No


Other congenital or acquired thrombophilia - If yes, enter type in comment: No


Thrombosis Risk Factor Assessment Total Risk Factor Score: 3


Thrombosis Risk Factor Assessment Level: Moderate Risk





Assessment and Plan


Plan: 


Impression and plan: 





1.  Abscess of right axilla with minimal cellulitis with history of MRSA status 

post incision and drainage on 02/05/2017 currently treated with IV antibiotics 

and wound packing in the outpatient setting.  Consult Dr. Abdul per patient 

request, continue IV antibiotics, local wound care per infectious disease 

recommendations.  Continue supportive treatment and pain management.  Urine, 

blood, and wound cultures in process.


2.  Urinary hesitancy.  Will obtain a urinalysis and a post void residual.


3.  Minimal edema to bilateral lower extremities.  Will apply NOY hose to 

prevent DVT.


4.  Constipation.  Will order Colace.


5.  Hypokalemia.  Replace potassium per protocol.  


6.  Dehydration.  BUN to creatinine greater than 20.  Continue IV hydration. 


7.  Hypothyroidism.  Continue Synthroid.


8.  Fibromyalgia.  Continue Lyrica.


9.  Hypertension.  Continue lisinopril and hydrochlorothiazide.


10.  Acute kidney injury, present on admission, suspect secondary to sepsis, 

improved.


11.  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


12.  Anxiety and depression.  Continue Abilify, Effexor.


13.  History of nicotine dependence.


14.  GERD.  Continue Protonix.


15.  DVT prophylaxis.  Continue Lovenox and NOY hose to bilateral lower 

extremities.


16.  GI prophylaxis.  Continue Protonix.


17.  Repeat CBC and BMP in a.m.








The above impression and plan have been discussed and directed by Dr. Carcamo. 

Lynette RIVERA-LOUISE acting as scribe for Dr. Carcamo.

## 2017-03-03 LAB
ANION GAP SERPL CALC-SCNC: 4 MMOL/L
BASOPHILS # BLD AUTO: 0 K/UL (ref 0–0.2)
BASOPHILS NFR BLD AUTO: 1 %
BUN SERPL-SCNC: 19 MG/DL (ref 7–17)
CALCIUM SPEC-MCNC: 9 MG/DL (ref 8.4–10.2)
CH: 32
CHCM: 32.7
CHLORIDE SERPL-SCNC: 108 MMOL/L (ref 98–107)
CO2 SERPL-SCNC: 27 MMOL/L (ref 22–30)
EOSINOPHIL # BLD AUTO: 0.2 K/UL (ref 0–0.7)
EOSINOPHIL NFR BLD AUTO: 4 %
ERYTHROCYTE [DISTWIDTH] IN BLOOD BY AUTOMATED COUNT: 3.51 M/UL (ref 3.8–5.4)
ERYTHROCYTE [DISTWIDTH] IN BLOOD: 14.8 % (ref 11.5–15.5)
GLUCOSE SERPL-MCNC: 63 MG/DL (ref 74–99)
HCT VFR BLD AUTO: 34.7 % (ref 34–46)
HDW: 2.91
HGB BLD-MCNC: 11.1 GM/DL (ref 11.4–16)
LUC NFR BLD AUTO: 5 %
LYMPHOCYTES # SPEC AUTO: 1.5 K/UL (ref 1–4.8)
LYMPHOCYTES NFR SPEC AUTO: 28 %
MCH RBC QN AUTO: 31.6 PG (ref 25–35)
MCHC RBC AUTO-ENTMCNC: 32 G/DL (ref 31–37)
MCV RBC AUTO: 98.7 FL (ref 80–100)
MONOCYTES # BLD AUTO: 0.2 K/UL (ref 0–1)
MONOCYTES NFR BLD AUTO: 4 %
NEUTROPHILS # BLD AUTO: 3.2 K/UL (ref 1.3–7.7)
NEUTROPHILS NFR BLD AUTO: 59 %
NON-AFRICAN AMERICAN GFR(MDRD): >60
POTASSIUM SERPL-SCNC: 4.1 MMOL/L (ref 3.5–5.1)
PREALB SERPL-MCNC: 28 MG/DL (ref 18–36)
SODIUM SERPL-SCNC: 139 MMOL/L (ref 137–145)
WBC # BLD AUTO: 0.26 10*3/UL
WBC # BLD AUTO: 5.5 K/UL (ref 3.8–10.6)
WBC (PEROX): 5.69

## 2017-03-03 RX ADMIN — PREGABALIN SCH MG: 75 CAPSULE ORAL at 15:49

## 2017-03-03 RX ADMIN — HYDROMORPHONE HYDROCHLORIDE PRN MG: 1 INJECTION, SOLUTION INTRAMUSCULAR; INTRAVENOUS; SUBCUTANEOUS at 03:09

## 2017-03-03 RX ADMIN — SODIUM CHLORIDE SCH MLS/HR: 900 INJECTION, SOLUTION INTRAVENOUS at 12:01

## 2017-03-03 RX ADMIN — KETOROLAC TROMETHAMINE SCH MG: 30 INJECTION, SOLUTION INTRAMUSCULAR at 21:35

## 2017-03-03 RX ADMIN — DOCUSATE SODIUM SCH MG: 100 CAPSULE, LIQUID FILLED ORAL at 21:35

## 2017-03-03 RX ADMIN — ENOXAPARIN SODIUM SCH MG: 40 INJECTION SUBCUTANEOUS at 08:57

## 2017-03-03 RX ADMIN — VENLAFAXINE HYDROCHLORIDE SCH MG: 75 CAPSULE, EXTENDED RELEASE ORAL at 08:56

## 2017-03-03 RX ADMIN — LEVOTHYROXINE SODIUM SCH MCG: 50 TABLET ORAL at 06:29

## 2017-03-03 RX ADMIN — FLUTICASONE PROPIONATE SCH SPRAY: 50 SPRAY, METERED NASAL at 21:36

## 2017-03-03 RX ADMIN — PANTOPRAZOLE SODIUM SCH MG: 40 TABLET, DELAYED RELEASE ORAL at 08:57

## 2017-03-03 RX ADMIN — ESTROGENS, CONJUGATED SCH MG: 0.3 TABLET, FILM COATED ORAL at 08:57

## 2017-03-03 RX ADMIN — HYDROMORPHONE HYDROCHLORIDE PRN MG: 1 INJECTION, SOLUTION INTRAMUSCULAR; INTRAVENOUS; SUBCUTANEOUS at 12:00

## 2017-03-03 RX ADMIN — DOCUSATE SODIUM SCH MG: 100 CAPSULE, LIQUID FILLED ORAL at 08:57

## 2017-03-03 RX ADMIN — HYDROMORPHONE HYDROCHLORIDE PRN MG: 1 INJECTION, SOLUTION INTRAMUSCULAR; INTRAVENOUS; SUBCUTANEOUS at 06:04

## 2017-03-03 RX ADMIN — PREGABALIN SCH MG: 75 CAPSULE ORAL at 08:57

## 2017-03-03 RX ADMIN — HYDROMORPHONE HYDROCHLORIDE PRN MG: 1 INJECTION, SOLUTION INTRAMUSCULAR; INTRAVENOUS; SUBCUTANEOUS at 08:58

## 2017-03-03 RX ADMIN — HYDROCODONE BITARTRATE AND ACETAMINOPHEN PRN EACH: 10; 325 TABLET ORAL at 22:58

## 2017-03-03 RX ADMIN — FLUTICASONE PROPIONATE SCH SPRAY: 50 SPRAY, METERED NASAL at 08:57

## 2017-03-03 RX ADMIN — LISINOPRIL AND HYDROCHLOROTHIAZIDE SCH EACH: 25; 20 TABLET ORAL at 08:57

## 2017-03-03 RX ADMIN — HYDROCODONE BITARTRATE AND ACETAMINOPHEN PRN EACH: 10; 325 TABLET ORAL at 14:21

## 2017-03-03 RX ADMIN — HYDROMORPHONE HYDROCHLORIDE PRN MG: 1 INJECTION, SOLUTION INTRAMUSCULAR; INTRAVENOUS; SUBCUTANEOUS at 18:26

## 2017-03-03 NOTE — P.CONS
History of Present Illness





- Reason for Consult


Consult date: 03/03/17





- Chief Complaint


Right axillary abscess





- History of Present Illness


51-year-old  female who has underlying history of obesity has history 

of significant MRSA infections in the past.  It is noted that every 5 2017 she 

was having significant abscess to her right axillary area.  She was admitted 

and underwent incision and drainage to that right axillary abscess.  MRSA was 

isolated.  Prior to admission she been treated with oral antibiotic therapy 

including Bactrim without significant improvement.  She was being treated with 

vancomycin therapy without improvement either.  Consequently she was discharged 

home on Teflaro which she is tolerating well.  Overnight comes back to hospital 

with increasing pain and drainage to the site.  Feeling poorly overall.  She's 

been having sensation of fever without high-grade temperatures being noted.  She

's had no significant chill or rigor.  She's had no difficulty tolerating 

antibiotic therapy.  She's had no nausea or emesis and no profuse diarrhea.  No 

skin rashes or other neurological changes.


She is known to infectious disease from several years ago when she had evidence 

of pneumonia associated with respiratory failure.  She even underwent 

tracheostomy for her profound sepsis and pneumonia.  Eventually he has 

recovered.  Now is evidence of the recurrent MRSA infection with a more severe 

process now in the right axillary area.








Review of Systems


HEENT:Denies headache or acute visual change.  Denies sinus or mouth 

discomforts.  Denies neck stiffness or pain.  Denies significant oral cavity 

pain.  Denies difficulty on swallowing.





Lungs: Denies significant shortness of breath, cough, sputum production, or 

hemoptysis.





Cardiovascular: Denies significant shortness of breath, chest pain, chest wall 

pain, 


orthopnea, dyspnea on exertion, syncope





Gastrointestinal:Denies nausea, vomiting, diarrhea, constipation, hematemesis, 

melena, hematochezia.  No no significant change of bowel habit noticed.





Musculoskeletal: Has pain and discomfort to the right axillary area.  No other 

significant musculoskeletal complaints at this time.  





Skin: Is evidence of the significant abscess to the right axillary area status 

post incision and drainage with open ulcerations and tunneling.





Neuro: Complains of chronic pain but has no acute visual changes.  Denies any 

new onset weakness or difficulty with ambulation.  Denies falls or seizures.





Psychiatric: Seems to have ongoing anxiety but denied depression 





Endocrine: Complains of chronic fatigue and ongoing weight gain.





Past Medical History


Past Medical History: Eye Disorder, Fibromyalgia, Hypertension, Pneumonia, 

Thyroid Disorder


Additional Past Medical History / Comment(s): horners syndrom rt eye, 

HYPOGLYCEMIA, ANEMIA, MIGRAINES


History of Any Multi-Drug Resistant Organisms: MRSA


Year Discovered:: 9/28/16


MDRO Source:: ANUS


Past Surgical History: Bariatric Surgery, Cholecystectomy, Hernia Repair, 

Hysterectomy, Orthopedic Surgery


Additional Past Surgical History / Comment(s): tummy tuck; prev. trach. foot 

surgery ( right side took out extra toe ), left foot surgery, TRACHEOSTOMY/ AND 

REMOVED


Past Anesthesia/Blood Transfusion Reactions: No Reported Reaction


Past Psychological History: ADD/ADHD, Anxiety, Depression


Additional Psychological History / Comment(s): Stopped tobacco smoking just in 

January of this year.  Medically disabled


Smoking Status: Former smoker


Past Alcohol Use History: None Reported


Additional Past Alcohol Use History / Comment(s): started smoking 1983, smoked 

1 PPD; pt states she quit 1/15/17


Past Drug Use History: None Reported





- Past Family History


  ** Father


Family Medical History: Cancer





Medications and Allergies


Home Medications and Allergies Comment(s): 





 Current Medications





Acetaminophen (Tylenol Tab)  650 mg PO Q6HR PRN


   PRN Reason: Mild Pain or Fever > 100.5


Hydrocodone Bitart/Acetaminophen (Norco 10)  1 each PO Q4H PRN


   PRN Reason: Pain


   Last Admin: 03/03/17 14:21 Dose:  1 each


Aripiprazole (Abilify)  5 mg PO DAILY [facility]


   Last Admin: 03/03/17 08:57 Dose:  5 mg


Aripiprazole (Abilify)  20 mg PO DAILY [facility]


   Last Admin: 03/03/17 08:57 Dose:  20 mg


Docusate Sodium (Colace)  100 mg PO BID [facility]


   Last Admin: 03/03/17 08:57 Dose:  100 mg


Enoxaparin Sodium (Lovenox)  40 mg SQ DAILY [facility]


   Last Admin: 03/03/17 08:57 Dose:  40 mg


Estrogens Conjugated (Premarin)  0.3 mg PO DAILY [facility]


   Last Admin: 03/03/17 08:57 Dose:  0.3 mg


Fluticasone Propionate (Flonase Nasal Spray)  2 spray EA NOSTRIL BID [facility]


   Last Admin: 03/03/17 08:57 Dose:  2 spray


Lisinopril/HCTZ (Zestoretic 20-25)  1 each PO DAILY [facility]


   Last Admin: 03/03/17 08:57 Dose:  1 each


Hydromorphone HCl (Dilaudid)  0.5 mg IVP Q6HR PRN


   PRN Reason: Pain


   Last Admin: 03/03/17 18:26 Dose:  0.5 mg


Ceftaroline Fosamil 600 mg/ (Sodium Chloride)  250 mls @ 250 mls/hr IVPB 0000,

1200 [facility]


   Last Admin: 03/03/17 12:01 Dose:  250 mls/hr


Levothyroxine Sodium (Synthroid)  50 mcg PO 0630 [facility]


   Last Admin: 03/03/17 06:29 Dose:  50 mcg


Lorazepam (Ativan)  0.5 mg IV Q6HR PRN


   PRN Reason: Anxiety


Miscellaneous Information (Potassium Per Protocol)  1 each MISCELLANE DAILY PRN

; Protocol


   PRN Reason: Per Protocol


Naloxone HCl (Narcan)  0.2 mg IV Q2M PRN


   PRN Reason: Opioid Reversal


Ondansetron HCl (Zofran)  4 mg IVP Q8HR PRN


   PRN Reason: Nausea And Vomiting


Pantoprazole Sodium (Protonix)  40 mg PO AC-[id number] [facility]


   Last Admin: 03/03/17 08:57 Dose:  40 mg


Pregabalin (Lyrica)  150 mg PO Q8HR [facility]


   Last Admin: 03/03/17 15:49 Dose:  150 mg


Venlafaxine HCl (Effexor Xr)  225 mg PO DAILY [facility]


   Last Admin: 03/03/17 08:56 Dose:  225 mg











 Home Medications











 Medication  Instructions  Recorded  Confirmed  Type


 


Lisinopril-Hctz 20-25 mg 1 tab PO DAILY 08/12/15 03/01/17 History





[Zestoretic 20-25]    


 


Estrogens, Conjugated [Premarin] 0.3 mg PO DAILY 06/09/16 03/01/17 History


 


ARIPiprazole [Abilify] 20 mg PO DAILY 09/28/16 03/01/17 History


 


Venlafaxine HCl [Effexor XR] 225 mg PO DAILY 09/28/16 03/01/17 History


 


ARIPiprazole [Abilify] 5 mg PO DAILY 02/03/17 03/01/17 History


 


Baclofen 10 - 20 mg PO HS PRN 02/03/17 03/01/17 History


 


Dextroamphetamine/Amphetamine 30 mg PO TID 02/03/17 03/01/17 History





[Adderall]    


 


Fluticasone Nasal Spray [Flonase 2 spray EA NOSTRIL BID 02/03/17 03/01/17 

History





Nasal Spray]    


 


Levothyroxine Sodium [Synthroid] 50 mcg PO DAILY 02/03/17 03/01/17 History


 


Pantoprazole [Protonix] 40 mg PO DAILY 02/03/17 03/01/17 History











 Allergies











Allergy/AdvReac Type Severity Reaction Status Date / Time


 


No Known Allergies Allergy   Verified 03/01/17 16:17














Physical Exam


Vitals: 


 Vital Signs











  Temp Pulse Pulse Resp BP Pulse Ox


 


 03/03/17 15:38     16  


 


 03/03/17 15:00  97.3 F L  90   18  122/77  98


 


 03/03/17 08:00    85   


 


 03/03/17 07:00  97.5 F L   85  16  113/80  95


 


 03/02/17 23:00  97.7 F   83  19  110/72  96








 Intake and Output











 03/03/17 03/03/17 03/03/17





 06:59 14:59 22:59


 


Intake Total 400  


 


Balance 400  


 


Intake:   


 


  Oral 400  


 


Other:   


 


  # Voids 1  











11-year-old woman who is currently complaining of pain at her axillary area.  

She otherwise has reportedly not a lot of other complaints at the moment.





HEENT: Anicteric conjunctiva are pink and moist nasal mucosa grossly intact 

without significant lesions, there is no thrush.


Neck: The neck is supple without significant lymphadenopathy or thyromegaly.


Lungs: There is symmetrical air entry.  There are expiratory wheezes throughout 

the lung fields.  There is no bronchial sounds, no changes of egophony or 

dullness.


Heart: Regular rate and rhythm with an audible S1-S2, no S3 soft S4  There is 

no significant murmur click or rub, PMI was nondisplaced.


Abdomen: Obese, Positive bowel sounds soft and nontender without palpable 

masses or organomegaly.  There was no guarding or rebound.


Extremities: The upper extremities have excellent pulses they are symmetric, no 

significant petechiae or telangiectasia.  No splinter hemorrhages were noted.  

Lower extremities has minimal trace edema


The right axillary area shows evidence the recent surgical intervention.  

Please see the nursing photography for the exact location of the surgical 

wounds.  She is evidence of the 3 open areas.  The 2 most cephalad are 

communicating underneath the skin bridge.  Measuring at 6 x 2.5 x 3 but has a 8 

cm tunnel at the 1 o'clock position there is a small area where there is 

incision and drainage onto the lateral aspect of the breast that is only 1 x 1 

x 0.1


Neuro: Awake alert oriented to person place and time.  There are no acute new 

gross focal sensory motor deficits.











Results


CBC & Chem 7: 


 03/03/17 10:30





 03/03/17 10:30


Labs: 


 Abnormal Lab Results - Last 24 Hours (Table)











  03/03/17 03/03/17 Range/Units





  10:30 10:30 


 


RBC  3.51 L   (3.80-5.40)  m/uL


 


Hgb  11.1 L   (11.4-16.0)  gm/dL


 


Chloride   108 H  ()  mmol/L


 


BUN   19 H  (7-17)  mg/dL


 


Glucose   63 L  (74-99)  mg/dL








 Microbiology - Last 24 Hours (Table)











 03/02/17 11:54 Urine Culture - Final





 Urine,Clean Catch 








 Laboratory Results











WBC  5.5 k/uL (3.8-10.6)   03/03/17  10:30    


 


RBC  3.51 m/uL (3.80-5.40)  L  03/03/17  10:30    


 


Hgb  11.1 gm/dL (11.4-16.0)  L  03/03/17  10:30    


 


Hct  34.7 % (34.0-46.0)   03/03/17  10:30    


 


MCV  98.7 fL (80.0-100.0)   03/03/17  10:30    


 


MCH  31.6 pg (25.0-35.0)   03/03/17  10:30    


 


MCHC  32.0 g/dL (31.0-37.0)   03/03/17  10:30    


 


RDW  14.8 % (11.5-15.5)   03/03/17  10:30    


 


Plt Count  244 k/uL (150-450)   03/03/17  10:30    


 


Neutrophils %  59 %  03/03/17  10:30    


 


Lymphocytes %  28 %  03/03/17  10:30    


 


Monocytes %  4 %  03/03/17  10:30    


 


Eosinophils %  4 %  03/03/17  10:30    


 


Basophils %  1 %  03/03/17  10:30    


 


Neutrophils #  3.2 k/uL (1.3-7.7)   03/03/17  10:30    


 


Lymphocytes #  1.5 k/uL (1.0-4.8)   03/03/17  10:30    


 


Monocytes #  0.2 k/uL (0-1.0)   03/03/17  10:30    


 


Eosinophils #  0.2 k/uL (0-0.7)   03/03/17  10:30    


 


Basophils #  0.0 k/uL (0-0.2)   03/03/17  10:30    


 


Manual Slide Review  Performed   03/03/17  10:30    


 


Macrocytosis  Slight   03/03/17  10:30    


 


Sodium  139 mmol/L (137-145)   03/03/17  10:30    


 


Potassium  4.1 mmol/L (3.5-5.1)   03/03/17  10:30    


 


Chloride  108 mmol/L ()  H  03/03/17  10:30    


 


Carbon Dioxide  27 mmol/L (22-30)   03/03/17  10:30    


 


Anion Gap  4 mmol/L  03/03/17  10:30    


 


BUN  19 mg/dL (7-17)  H  03/03/17  10:30    


 


Creatinine  0.72 mg/dL (0.52-1.04)   03/03/17  10:30    


 


Est GFR (MDRD) Af Amer  >60  (>60 ml/min/1.73 sqM)   03/03/17  10:30    


 


Est GFR (MDRD) Non-Af  >60  (>60 ml/min/1.73 sqM)   03/03/17  10:30    


 


Glucose  63 mg/dL (74-99)  L  03/03/17  10:30    


 


Calcium  9.0 mg/dL (8.4-10.2)   03/03/17  10:30    


 


Total Bilirubin  0.2 mg/dL (0.2-1.3)   03/01/17  16:30    


 


AST  21 U/L (14-36)   03/01/17  16:30    


 


ALT  28 U/L (9-52)   03/01/17  16:30    


 


Alkaline Phosphatase  94 U/L ()   03/01/17  16:30    


 


C-Reactive Protein  29.7 mg/L (<10.0)  H  03/01/17  16:30    


 


NT-Pro-B Natriuret Pep  71 pg/mL  03/01/17  16:30    


 


Total Protein  6.2 g/dL (6.3-8.2)  L  03/01/17  16:30    


 


Albumin  3.5 g/dL (3.5-5.0)   03/01/17  16:30    


 


Urine Color  Yellow   03/02/17  11:54    


 


Urine Appearance  Clear  (Clear)   03/02/17  11:54    


 


Urine pH  6.0  (5.0-8.0)   03/02/17  11:54    


 


Ur Specific Gravity  1.013  (1.001-1.035)   03/02/17  11:54    


 


Urine Protein  Negative  (Negative)   03/02/17  11:54    


 


Urine Glucose (UA)  Negative  (Negative)   03/02/17  11:54    


 


Urine Ketones  Negative  (Negative)   03/02/17  11:54    


 


Urine Blood  Small  (Negative)  H  03/02/17  11:54    


 


Urine Nitrate  Negative  (Negative)   03/02/17  11:54    


 


Urine Bilirubin  Negative  (Negative)   03/02/17  11:54    


 


Urine Urobilinogen  <2.0 mg/dL (<2.0)   03/02/17  11:54    


 


Ur Leukocyte Esterase  Negative  (Negative)   03/02/17  11:54    


 


Urine RBC  8 /hpf (0-5)  H  03/02/17  11:54    


 


Urine WBC  2 /hpf (0-5)   03/02/17  11:54    


 


Ur Squamous Epith Cells  5 /hpf (0-4)  H  03/02/17  11:54    


 


Urine Mucus  Rare /hpf (None)  H  03/02/17  11:54    








 Microbiology





03/01/17 17:00   Axilla - Right   Gram Stain - Final


03/01/17 17:00   Axilla - Right   Wound Culture - Final


03/01/17 16:30   Blood   Blood Culture - Preliminary


                            No Growth after 48 hours


03/02/17 11:54   Urine,Clean Catch   Urine Culture - Final











Assessment and Plan


(1) Abscess of right axilla


Narrative/Plan: 


51-year-old woman who has a significant past medical history of pneumonia and 

respiratory failure that required tracheostomy and long-term care.  Eventually 

recovered.  Recently is having difficulty with MRSA infection.  Developed 

evidence of an abscess to her right axillary area.  She was hospitalized last 

month and had an incision and drainage performed.  Is evidence of an ongoing 

ulceration that continues to give her difficulty.  Because he presented back to 

hospital.  Complaining of pain at the site.  Is requesting more pain 

medications.  We discussed at this point in time some anti-inflammatories can 

be utilized however would try to minimize her narcotic use given the long-term 

nature of this problem.


This far as antibiotic therapy the Ceftaroline is an adequate choice cultures.  

Will be continued for now.


The ulceration is packed at this point in time with Aquacel silver rope.


We discussed potential other options.  Negative pressure therapy is an option 

but would need a gauze based negative pressure therapy system.  We to arrange 

this as an outpatient.  Since we don't stock in the hospital.  We'll follow up 

with her in the wound healing center after her discharge.


Status: Acute   





(2) MRSA infection


Status: Acute   





(3) Pain in right axilla


Status: Acute

## 2017-03-03 NOTE — P.PN
Subjective


Principal diagnosis: 


Abscess right axilla





Patient is a 51-year-old  female with past medical history significant 

for recent hospitalization for severe sepsis secondary to right axillary 

abscess status post incision and drainage on 02/05/2017 by Dr. Aden with fluid 

cultures positive for MRSA discharged home on 02/08/2017 with a PICC line and 

IV Teraflo for 24 weeks with local wound care per Dr. Heard from infectious 

disease service.  Patient presented to the emergency department with multiple 

complaints including on and off fevers, chills, swelling of her lower 

extremities, and increased incisional pain.  CT of chest upon admission showed 

interval improvement in the right axillary cellulitis/phlegmon pattern when 

compared to previous exam on February 4 with no drainable fluid collection 

identified.  Patient was admitted to the fourth floor and consult was requested 

for Dr. Abdul from infectious disease service per patient request. 





Upon examination, patient reports right axilla pain has improved with IV 

Dilaudid.  Patient denies chills, nausea, vomiting, shortness of breath, chest 

pain, or abdominal pain.  Patient remains afebrile.  Blood pressure 113/80.  No 

evidence of leukocytosis.  Preliminary urine, wound culture, and blood culture 

with no growth after 24 hours.  Patient continues on IV Teflaro.  Infectious 

disease consult pending.





Objective





- Vital Signs


Vital signs: 


 Vital Signs











Temp  97.5 F L  03/03/17 07:00


 


Pulse  85   03/03/17 07:00


 


Resp  16   03/03/17 07:00


 


BP  113/80   03/03/17 07:00


 


Pulse Ox  95   03/03/17 07:00








 Intake & Output











 03/02/17 03/03/17 03/03/17





 18:59 06:59 18:59


 


Intake Total  800 


 


Balance  800 


 


Intake:   


 


  Oral  800 


 


Other:   


 


  Voiding Method Toilet  


 


  # Voids 2 1 














- Exam


GENERAL: Pt awake and alert, well-appearing, well-nourished, and in no acute 

distress.


HEAD: Atraumatic, normocephalic.


EYES: Pupils equal, round, and reactive to light, extraocular movements intact, 

sclera anicteric, conjunctiva are normal.


ENT: Oropharynx clear without exudates.  Moist mucous membranes.


NECK: Supple without lymphadenopathy or JVD. 


LUNGS: Breath sounds clear to auscultation bilaterally.  No wheezes, rales, or 

rhonchi.


HEART: Heart S1, S2, no S3 or S4.  Regular rate and rhythm.  No murmurs, rubs 

or gallops.


ABDOMEN: Soft, mild suprapubic tenderness, nondistended, normoactive bowel 

sounds.  No guarding, no rebound.  No masses or organomegaly appreciated. 


EXTREMITIES: Palpable pulses.  Trace edema to bilateral lower extremities. No 

calf tenderness.


NEUROLOGICAL: Pt oriented x 3.  No focal deficits.  Strength and sensation 

grossly intact.


PSYCH: Normal mood, normal affect.


SKIN: Warm and dry.  Dressing to right axilla intact.





- Labs


CBC & Chem 7: 


 03/03/17 10:30





 03/03/17 10:30


Labs: 


 Abnormal Lab Results - Last 24 Hours (Table)











  03/02/17 03/03/17 03/03/17 Range/Units





  11:54 10:30 10:30 


 


RBC   3.51 L   (3.80-5.40)  m/uL


 


Hgb   11.1 L   (11.4-16.0)  gm/dL


 


Chloride    108 H  ()  mmol/L


 


BUN    19 H  (7-17)  mg/dL


 


Glucose    63 L  (74-99)  mg/dL


 


Urine Blood  Small H    (Negative)  


 


Urine RBC  8 H    (0-5)  /hpf


 


Ur Squamous Epith Cells  5 H    (0-4)  /hpf


 


Urine Mucus  Rare H    (None)  /hpf








 Microbiology - Last 24 Hours (Table)











 03/02/17 11:54 Urine Culture - Preliminary





 Urine,Clean Catch 














Assessment and Plan


Plan: 


Impression and plan: 





1.  Abscess of right axilla with minimal cellulitis with history of MRSA status 

post incision and drainage on 02/05/2017 currently treated with IV antibiotics 

and wound packing in the outpatient setting.  Consult Dr. Abdul per patient 

request, continue IV antibiotics, local wound care per infectious disease 

recommendations.  Continue supportive treatment and pain management.  Urine, 

blood, and wound cultures in process.


2.  Urinary hesitancy.  Post void residual pending.


3.  Minimal edema to bilateral lower extremities.  Will apply NOY hose to 

prevent DVT.


4.  Constipation.  Will order Colace.


5.  Hypokalemia, present on admission, resolved.


6.  Dehydration, resolved. Continue IV hydration. 


7.  Hypothyroidism.  Continue Synthroid.


8.  Fibromyalgia.  Continue Lyrica.


9.  Hypertension.  Continue lisinopril and hydrochlorothiazide.


10.  Acute kidney injury, present on admission, suspect secondary to 

dehydration and intravascular volume depletion, resolved.


11.  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


12.  Anxiety and depression.  Continue Abilify, Effexor.


13.  History of nicotine dependence.


14.  GERD.  Continue Protonix.


15.  DVT prophylaxis.  Continue Lovenox and NOY hose to bilateral lower 

extremities.


16.  GI prophylaxis.  Continue Protonix.


17.  Repeat CBC and BMP in a.m.





Possible discharge home later today or tomorrow if cleared by Dr. Abdul.





The above impression and plan have been discussed and directed by Dr. Carcamo. 

Lynette MCGOWAN acting as scribe for Dr. Carcamo.

## 2017-03-04 RX ADMIN — ENOXAPARIN SODIUM SCH MG: 40 INJECTION SUBCUTANEOUS at 07:45

## 2017-03-04 RX ADMIN — HYDROMORPHONE HYDROCHLORIDE PRN MG: 1 INJECTION, SOLUTION INTRAMUSCULAR; INTRAVENOUS; SUBCUTANEOUS at 00:44

## 2017-03-04 RX ADMIN — PREGABALIN SCH MG: 75 CAPSULE ORAL at 07:44

## 2017-03-04 RX ADMIN — KETOROLAC TROMETHAMINE SCH MG: 30 INJECTION, SOLUTION INTRAMUSCULAR at 12:16

## 2017-03-04 RX ADMIN — KETOROLAC TROMETHAMINE SCH MG: 30 INJECTION, SOLUTION INTRAMUSCULAR at 06:44

## 2017-03-04 RX ADMIN — SODIUM CHLORIDE SCH MLS/HR: 900 INJECTION, SOLUTION INTRAVENOUS at 01:18

## 2017-03-04 RX ADMIN — DOCUSATE SODIUM SCH MG: 100 CAPSULE, LIQUID FILLED ORAL at 07:44

## 2017-03-04 RX ADMIN — ESTROGENS, CONJUGATED SCH MG: 0.3 TABLET, FILM COATED ORAL at 07:45

## 2017-03-04 RX ADMIN — VENLAFAXINE HYDROCHLORIDE SCH MG: 75 CAPSULE, EXTENDED RELEASE ORAL at 07:44

## 2017-03-04 RX ADMIN — PREGABALIN SCH MG: 75 CAPSULE ORAL at 17:05

## 2017-03-04 RX ADMIN — KETOROLAC TROMETHAMINE SCH MG: 30 INJECTION, SOLUTION INTRAMUSCULAR at 17:56

## 2017-03-04 RX ADMIN — PREGABALIN SCH MG: 75 CAPSULE ORAL at 01:18

## 2017-03-04 RX ADMIN — HYDROMORPHONE HYDROCHLORIDE PRN MG: 1 INJECTION, SOLUTION INTRAMUSCULAR; INTRAVENOUS; SUBCUTANEOUS at 07:46

## 2017-03-04 RX ADMIN — HYDROCODONE BITARTRATE AND ACETAMINOPHEN PRN EACH: 10; 325 TABLET ORAL at 17:05

## 2017-03-04 RX ADMIN — FLUTICASONE PROPIONATE SCH SPRAY: 50 SPRAY, METERED NASAL at 07:45

## 2017-03-04 RX ADMIN — LISINOPRIL AND HYDROCHLOROTHIAZIDE SCH EACH: 25; 20 TABLET ORAL at 07:43

## 2017-03-04 RX ADMIN — LEVOTHYROXINE SODIUM SCH MCG: 50 TABLET ORAL at 06:44

## 2017-03-04 RX ADMIN — HYDROMORPHONE HYDROCHLORIDE PRN MG: 1 INJECTION, SOLUTION INTRAMUSCULAR; INTRAVENOUS; SUBCUTANEOUS at 14:03

## 2017-03-04 RX ADMIN — HYDROMORPHONE HYDROCHLORIDE PRN MG: 1 INJECTION, SOLUTION INTRAMUSCULAR; INTRAVENOUS; SUBCUTANEOUS at 21:13

## 2017-03-04 RX ADMIN — SODIUM CHLORIDE SCH MLS/HR: 900 INJECTION, SOLUTION INTRAVENOUS at 12:15

## 2017-03-04 RX ADMIN — PANTOPRAZOLE SODIUM SCH MG: 40 TABLET, DELAYED RELEASE ORAL at 07:44

## 2017-03-04 RX ADMIN — DOCUSATE SODIUM SCH MG: 100 CAPSULE, LIQUID FILLED ORAL at 21:25

## 2017-03-04 RX ADMIN — HYDROCODONE BITARTRATE AND ACETAMINOPHEN PRN EACH: 10; 325 TABLET ORAL at 04:39

## 2017-03-04 RX ADMIN — KETOROLAC TROMETHAMINE SCH MG: 30 INJECTION, SOLUTION INTRAMUSCULAR at 02:21

## 2017-03-04 RX ADMIN — FLUTICASONE PROPIONATE SCH SPRAY: 50 SPRAY, METERED NASAL at 21:25

## 2017-03-04 RX ADMIN — THERA TABS SCH EACH: TAB at 12:16

## 2017-03-05 RX ADMIN — PANTOPRAZOLE SODIUM SCH MG: 40 TABLET, DELAYED RELEASE ORAL at 08:04

## 2017-03-05 RX ADMIN — PREGABALIN SCH MG: 75 CAPSULE ORAL at 08:04

## 2017-03-05 RX ADMIN — ENOXAPARIN SODIUM SCH MG: 40 INJECTION SUBCUTANEOUS at 08:04

## 2017-03-05 RX ADMIN — DOCUSATE SODIUM SCH: 100 CAPSULE, LIQUID FILLED ORAL at 21:09

## 2017-03-05 RX ADMIN — KETOROLAC TROMETHAMINE SCH MG: 30 INJECTION, SOLUTION INTRAMUSCULAR at 05:49

## 2017-03-05 RX ADMIN — ESTROGENS, CONJUGATED SCH MG: 0.3 TABLET, FILM COATED ORAL at 08:04

## 2017-03-05 RX ADMIN — KETOROLAC TROMETHAMINE SCH MG: 30 INJECTION, SOLUTION INTRAMUSCULAR at 18:02

## 2017-03-05 RX ADMIN — PREGABALIN SCH MG: 75 CAPSULE ORAL at 23:34

## 2017-03-05 RX ADMIN — DOCUSATE SODIUM SCH: 100 CAPSULE, LIQUID FILLED ORAL at 08:04

## 2017-03-05 RX ADMIN — SODIUM CHLORIDE SCH MLS/HR: 900 INJECTION, SOLUTION INTRAVENOUS at 23:34

## 2017-03-05 RX ADMIN — VENLAFAXINE HYDROCHLORIDE SCH MG: 75 CAPSULE, EXTENDED RELEASE ORAL at 08:04

## 2017-03-05 RX ADMIN — KETOROLAC TROMETHAMINE SCH MG: 30 INJECTION, SOLUTION INTRAMUSCULAR at 12:08

## 2017-03-05 RX ADMIN — PREGABALIN SCH MG: 75 CAPSULE ORAL at 00:03

## 2017-03-05 RX ADMIN — HYDROMORPHONE HYDROCHLORIDE PRN MG: 1 INJECTION, SOLUTION INTRAMUSCULAR; INTRAVENOUS; SUBCUTANEOUS at 05:50

## 2017-03-05 RX ADMIN — HYDROCODONE BITARTRATE AND ACETAMINOPHEN PRN EACH: 10; 325 TABLET ORAL at 21:09

## 2017-03-05 RX ADMIN — KETOROLAC TROMETHAMINE SCH MG: 30 INJECTION, SOLUTION INTRAMUSCULAR at 00:04

## 2017-03-05 RX ADMIN — THERA TABS SCH EACH: TAB at 12:08

## 2017-03-05 RX ADMIN — PREGABALIN SCH MG: 75 CAPSULE ORAL at 17:40

## 2017-03-05 RX ADMIN — HYDROMORPHONE HYDROCHLORIDE PRN MG: 1 INJECTION, SOLUTION INTRAMUSCULAR; INTRAVENOUS; SUBCUTANEOUS at 12:13

## 2017-03-05 RX ADMIN — FLUTICASONE PROPIONATE SCH SPRAY: 50 SPRAY, METERED NASAL at 08:04

## 2017-03-05 RX ADMIN — HYDROMORPHONE HYDROCHLORIDE PRN MG: 1 INJECTION, SOLUTION INTRAMUSCULAR; INTRAVENOUS; SUBCUTANEOUS at 23:50

## 2017-03-05 RX ADMIN — FLUTICASONE PROPIONATE SCH SPRAY: 50 SPRAY, METERED NASAL at 21:08

## 2017-03-05 RX ADMIN — LEVOTHYROXINE SODIUM SCH MCG: 50 TABLET ORAL at 06:27

## 2017-03-05 RX ADMIN — HYDROMORPHONE HYDROCHLORIDE PRN MG: 1 INJECTION, SOLUTION INTRAMUSCULAR; INTRAVENOUS; SUBCUTANEOUS at 18:03

## 2017-03-05 RX ADMIN — HYDROCODONE BITARTRATE AND ACETAMINOPHEN PRN EACH: 10; 325 TABLET ORAL at 09:09

## 2017-03-05 RX ADMIN — KETOROLAC TROMETHAMINE SCH MG: 30 INJECTION, SOLUTION INTRAMUSCULAR at 23:49

## 2017-03-05 RX ADMIN — HYDROCODONE BITARTRATE AND ACETAMINOPHEN PRN EACH: 10; 325 TABLET ORAL at 14:54

## 2017-03-05 RX ADMIN — SODIUM CHLORIDE SCH MLS/HR: 900 INJECTION, SOLUTION INTRAVENOUS at 12:08

## 2017-03-05 RX ADMIN — LISINOPRIL AND HYDROCHLOROTHIAZIDE SCH EACH: 25; 20 TABLET ORAL at 08:04

## 2017-03-05 RX ADMIN — SODIUM CHLORIDE SCH MLS/HR: 900 INJECTION, SOLUTION INTRAVENOUS at 00:04

## 2017-03-06 VITALS — RESPIRATION RATE: 16 BRPM

## 2017-03-06 LAB
ANION GAP SERPL CALC-SCNC: 5 MMOL/L
BUN SERPL-SCNC: 20 MG/DL (ref 7–17)
CALCIUM SPEC-MCNC: 8.6 MG/DL (ref 8.4–10.2)
CHLORIDE SERPL-SCNC: 112 MMOL/L (ref 98–107)
CO2 SERPL-SCNC: 23 MMOL/L (ref 22–30)
GLUCOSE SERPL-MCNC: 101 MG/DL (ref 74–99)
NON-AFRICAN AMERICAN GFR(MDRD): >60
POTASSIUM SERPL-SCNC: 3.8 MMOL/L (ref 3.5–5.1)
SODIUM SERPL-SCNC: 140 MMOL/L (ref 137–145)

## 2017-03-06 RX ADMIN — HYDROCODONE BITARTRATE AND ACETAMINOPHEN PRN EACH: 10; 325 TABLET ORAL at 20:14

## 2017-03-06 RX ADMIN — HYDROCODONE BITARTRATE AND ACETAMINOPHEN PRN EACH: 10; 325 TABLET ORAL at 03:43

## 2017-03-06 RX ADMIN — HYDROCODONE BITARTRATE AND ACETAMINOPHEN PRN EACH: 10; 325 TABLET ORAL at 14:21

## 2017-03-06 RX ADMIN — VENLAFAXINE HYDROCHLORIDE SCH MG: 75 CAPSULE, EXTENDED RELEASE ORAL at 08:10

## 2017-03-06 RX ADMIN — HYDROMORPHONE HYDROCHLORIDE PRN MG: 1 INJECTION, SOLUTION INTRAMUSCULAR; INTRAVENOUS; SUBCUTANEOUS at 17:52

## 2017-03-06 RX ADMIN — FLUTICASONE PROPIONATE SCH SPRAY: 50 SPRAY, METERED NASAL at 08:10

## 2017-03-06 RX ADMIN — THERA TABS SCH EACH: TAB at 11:26

## 2017-03-06 RX ADMIN — HYDROCODONE BITARTRATE AND ACETAMINOPHEN PRN EACH: 10; 325 TABLET ORAL at 09:46

## 2017-03-06 RX ADMIN — ENOXAPARIN SODIUM SCH MG: 40 INJECTION SUBCUTANEOUS at 08:09

## 2017-03-06 RX ADMIN — ESTROGENS, CONJUGATED SCH MG: 0.3 TABLET, FILM COATED ORAL at 08:09

## 2017-03-06 RX ADMIN — FLUTICASONE PROPIONATE SCH SPRAY: 50 SPRAY, METERED NASAL at 20:15

## 2017-03-06 RX ADMIN — PREGABALIN SCH MG: 75 CAPSULE ORAL at 08:07

## 2017-03-06 RX ADMIN — KETOROLAC TROMETHAMINE SCH MG: 30 INJECTION, SOLUTION INTRAMUSCULAR at 17:51

## 2017-03-06 RX ADMIN — KETOROLAC TROMETHAMINE SCH MG: 30 INJECTION, SOLUTION INTRAMUSCULAR at 11:24

## 2017-03-06 RX ADMIN — DOCUSATE SODIUM SCH MG: 100 CAPSULE, LIQUID FILLED ORAL at 08:09

## 2017-03-06 RX ADMIN — PREGABALIN SCH MG: 75 CAPSULE ORAL at 23:08

## 2017-03-06 RX ADMIN — SODIUM CHLORIDE SCH MLS/HR: 900 INJECTION, SOLUTION INTRAVENOUS at 11:26

## 2017-03-06 RX ADMIN — LISINOPRIL AND HYDROCHLOROTHIAZIDE SCH EACH: 25; 20 TABLET ORAL at 08:10

## 2017-03-06 RX ADMIN — HYDROMORPHONE HYDROCHLORIDE PRN MG: 1 INJECTION, SOLUTION INTRAMUSCULAR; INTRAVENOUS; SUBCUTANEOUS at 11:25

## 2017-03-06 RX ADMIN — DOCUSATE SODIUM SCH MG: 100 CAPSULE, LIQUID FILLED ORAL at 20:14

## 2017-03-06 RX ADMIN — PANTOPRAZOLE SODIUM SCH MG: 40 TABLET, DELAYED RELEASE ORAL at 08:07

## 2017-03-06 RX ADMIN — SODIUM CHLORIDE SCH MLS/HR: 900 INJECTION, SOLUTION INTRAVENOUS at 23:08

## 2017-03-06 RX ADMIN — PREGABALIN SCH MG: 75 CAPSULE ORAL at 16:22

## 2017-03-06 RX ADMIN — KETOROLAC TROMETHAMINE SCH MG: 30 INJECTION, SOLUTION INTRAMUSCULAR at 05:44

## 2017-03-06 RX ADMIN — HYDROMORPHONE HYDROCHLORIDE PRN MG: 1 INJECTION, SOLUTION INTRAMUSCULAR; INTRAVENOUS; SUBCUTANEOUS at 05:45

## 2017-03-06 RX ADMIN — LEVOTHYROXINE SODIUM SCH MCG: 50 TABLET ORAL at 06:18

## 2017-03-06 NOTE — P.PN
Subjective


Principal diagnosis: 





Right axillary abscess





51-year-old  female who has underlying history of obesity has history 

of significant MRSA infections in the past.  It is noted that every 5 2017 she 

was having significant abscess to her right axillary area.  She was admitted 

and underwent incision and drainage to that right axillary abscess.  MRSA was 

isolated.  Prior to admission she been treated with oral antibiotic therapy 

including Bactrim without significant improvement.  She was being treated with 

vancomycin therapy without improvement either.  Consequently she was discharged 

home on Teflaro which she is tolerating well.  Overnight comes back to hospital 

with increasing pain and drainage to the site.  Feeling poorly overall.  She's 

been having sensation of fever without high-grade temperatures being noted.  She

's had no significant chill or rigor.  She's had no difficulty tolerating 

antibiotic therapy.  She's had no nausea or emesis and no profuse diarrhea.  No 

skin rashes or other neurological changes.


She is known to infectious disease from several years ago when she had evidence 

of pneumonia associated with respiratory failure.  She even underwent 

tracheostomy for her profound sepsis and pneumonia.  Eventually he has 

recovered.  Now is evidence of the recurrent MRSA infection with a more severe 

process now in the right axillary area.





Is now doing considerably better.  Looking for to going home.  We'll be 

following up in the wound healing Center.  We'll need to make arrangements for 

a gauze base negative pressure therapy system.











Objective





- Vital Signs


Vital signs: 


 Vital Signs











Temp  97.2 F L  03/06/17 15:00


 


Pulse  79   03/06/17 15:00


 


Resp  16   03/06/17 15:00


 


BP  122/78   03/06/17 15:00


 


Pulse Ox  96   03/06/17 15:00








 Intake & Output











 03/05/17 03/06/17 03/06/17





 18:59 06:59 18:59


 


Intake Total 240  500


 


Balance 240  500


 


Intake:   


 


  Oral 240  500


 


Other:   


 


  # Voids 2 2 2














- Exam


51-year-old woman who is currently complaining of pain at her axillary area.  

She otherwise has reportedly not a lot of other complaints at the moment.





HEENT: Anicteric conjunctiva are pink and moist nasal mucosa grossly intact 

without significant lesions, there is no thrush.


Neck: The neck is supple without significant lymphadenopathy or thyromegaly.


Lungs: There is symmetrical air entry.  There are expiratory wheezes throughout 

the lung fields.  There is no bronchial sounds, no changes of egophony or 

dullness.


Heart: Regular rate and rhythm with an audible S1-S2, no S3 soft S4  There is 

no significant murmur click or rub, PMI was nondisplaced.


Abdomen: Obese, Positive bowel sounds soft and nontender without palpable 

masses or organomegaly.  There was no guarding or rebound.


Extremities: The upper extremities have excellent pulses they are symmetric, no 

significant petechiae or telangiectasia.  No splinter hemorrhages were noted.  

Lower extremities has minimal trace edema


The right axillary area shows evidence the recent surgical intervention.  

Please see the nursing photography for the exact location of the surgical 

wounds.  She is evidence of the 3 open areas.  The 2 most cephalad are 

communicating underneath the skin bridge.  Measuring at 6 x 2.5 x 3 but has a 8 

cm tunnel at the 1 o'clock position there is a small area where there is 

incision and drainage onto the lateral aspect of the breast that is only 1 x 1 

x 0.1


Neuro: Awake alert oriented to person place and time.  There are no acute new 

gross focal sensory motor deficits.











- Labs


CBC & Chem 7: 


 03/03/17 10:30





 03/06/17 11:17


Labs: 


 Abnormal Lab Results - Last 24 Hours (Table)











  03/06/17 Range/Units





  11:17 


 


Chloride  112 H  ()  mmol/L


 


BUN  20 H  (7-17)  mg/dL


 


Glucose  101 H  (74-99)  mg/dL








 Laboratory Results











WBC  5.5 k/uL (3.8-10.6)   03/03/17  10:30    


 


RBC  3.51 m/uL (3.80-5.40)  L  03/03/17  10:30    


 


Hgb  11.1 gm/dL (11.4-16.0)  L  03/03/17  10:30    


 


Hct  34.7 % (34.0-46.0)   03/03/17  10:30    


 


MCV  98.7 fL (80.0-100.0)   03/03/17  10:30    


 


MCH  31.6 pg (25.0-35.0)   03/03/17  10:30    


 


MCHC  32.0 g/dL (31.0-37.0)   03/03/17  10:30    


 


RDW  14.8 % (11.5-15.5)   03/03/17  10:30    


 


Plt Count  244 k/uL (150-450)   03/03/17  10:30    


 


Neutrophils %  59 %  03/03/17  10:30    


 


Lymphocytes %  28 %  03/03/17  10:30    


 


Monocytes %  4 %  03/03/17  10:30    


 


Eosinophils %  4 %  03/03/17  10:30    


 


Basophils %  1 %  03/03/17  10:30    


 


Neutrophils #  3.2 k/uL (1.3-7.7)   03/03/17  10:30    


 


Lymphocytes #  1.5 k/uL (1.0-4.8)   03/03/17  10:30    


 


Monocytes #  0.2 k/uL (0-1.0)   03/03/17  10:30    


 


Eosinophils #  0.2 k/uL (0-0.7)   03/03/17  10:30    


 


Basophils #  0.0 k/uL (0-0.2)   03/03/17  10:30    


 


Manual Slide Review  Performed   03/03/17  10:30    


 


Macrocytosis  Slight   03/03/17  10:30    


 


Sodium  140 mmol/L (137-145)   03/06/17  11:17    


 


Potassium  3.8 mmol/L (3.5-5.1)   03/06/17  11:17    


 


Chloride  112 mmol/L ()  H  03/06/17  11:17    


 


Carbon Dioxide  23 mmol/L (22-30)   03/06/17  11:17    


 


Anion Gap  5 mmol/L  03/06/17  11:17    


 


BUN  20 mg/dL (7-17)  H  03/06/17  11:17    


 


Creatinine  0.70 mg/dL (0.52-1.04)   03/06/17  11:17    


 


Est GFR (MDRD) Af Amer  >60  (>60 ml/min/1.73 sqM)   03/06/17  11:17    


 


Est GFR (MDRD) Non-Af  >60  (>60 ml/min/1.73 sqM)   03/06/17  11:17    


 


Glucose  101 mg/dL (74-99)  H  03/06/17  11:17    


 


Calcium  8.6 mg/dL (8.4-10.2)   03/06/17  11:17    


 


Total Bilirubin  0.2 mg/dL (0.2-1.3)   03/01/17  16:30    


 


AST  21 U/L (14-36)   03/01/17  16:30    


 


ALT  28 U/L (9-52)   03/01/17  16:30    


 


Alkaline Phosphatase  94 U/L ()   03/01/17  16:30    


 


C-Reactive Protein  17.8 mg/L (<10.0)  H  03/03/17  10:30    


 


NT-Pro-B Natriuret Pep  71 pg/mL  03/01/17  16:30    


 


Total Protein  6.2 g/dL (6.3-8.2)  L  03/01/17  16:30    


 


Albumin  3.5 g/dL (3.5-5.0)   03/01/17  16:30    


 


Prealbumin  28 mg/dL (18-36)   03/03/17  10:30    


 


Urine Color  Yellow   03/02/17  11:54    


 


Urine Appearance  Clear  (Clear)   03/02/17  11:54    


 


Urine pH  6.0  (5.0-8.0)   03/02/17  11:54    


 


Ur Specific Gravity  1.013  (1.001-1.035)   03/02/17  11:54    


 


Urine Protein  Negative  (Negative)   03/02/17  11:54    


 


Urine Glucose (UA)  Negative  (Negative)   03/02/17  11:54    


 


Urine Ketones  Negative  (Negative)   03/02/17  11:54    


 


Urine Blood  Small  (Negative)  H  03/02/17  11:54    


 


Urine Nitrate  Negative  (Negative)   03/02/17  11:54    


 


Urine Bilirubin  Negative  (Negative)   03/02/17  11:54    


 


Urine Urobilinogen  <2.0 mg/dL (<2.0)   03/02/17  11:54    


 


Ur Leukocyte Esterase  Negative  (Negative)   03/02/17  11:54    


 


Urine RBC  8 /hpf (0-5)  H  03/02/17  11:54    


 


Urine WBC  2 /hpf (0-5)   03/02/17  11:54    


 


Ur Squamous Epith Cells  5 /hpf (0-4)  H  03/02/17  11:54    


 


Urine Mucus  Rare /hpf (None)  H  03/02/17  11:54    








 Microbiology





03/01/17 16:30   Blood   Blood Culture - Preliminary


                            No Growth after 96 hours


03/01/17 17:00   Axilla - Right   Gram Stain - Final


03/01/17 17:00   Axilla - Right   Wound Culture - Final


03/02/17 11:54   Urine,Clean Catch   Urine Culture - Final











Assessment and Plan


(1) Abscess of right axilla


Narrative/Plan: 


51-year-old woman who has a significant past medical history of pneumonia and 

respiratory failure that required tracheostomy and long-term care.  Eventually 

recovered.  Recently is having difficulty with MRSA infection.  Developed 

evidence of an abscess to her right axillary area.  She was hospitalized last 

month and had an incision and drainage performed.  Is evidence of an ongoing 

ulceration that continues to give her difficulty.  Because he presented back to 

hospital.  Complaining of pain at the site.  Is requesting more pain 

medications.  We discussed at this point in time some anti-inflammatories can 

be utilized however would try to minimize her narcotic use given the long-term 

nature of this problem.





The ulceration is packed at this point in time with Aquacel silver rope.





We discussed potential other options.  


Negative pressure therapy is an option but would need a gauze based negative 

pressure therapy system.  


 to arrange this as an outpatient, if the wound healing Center.  Since we don't 

stock in the hospital. 


 We'll follow up with her in the wound healing center after her discharge.


Complete the course of Ceftaroline.


Status: Acute   





(2) MRSA infection


Status: Acute   





(3) Pain in right axilla


Status: Acute

## 2017-03-07 VITALS — HEART RATE: 83 BPM | SYSTOLIC BLOOD PRESSURE: 136 MMHG | TEMPERATURE: 98.7 F | DIASTOLIC BLOOD PRESSURE: 71 MMHG

## 2017-03-07 RX ADMIN — ESTROGENS, CONJUGATED SCH MG: 0.3 TABLET, FILM COATED ORAL at 07:58

## 2017-03-07 RX ADMIN — VENLAFAXINE HYDROCHLORIDE SCH MG: 75 CAPSULE, EXTENDED RELEASE ORAL at 07:58

## 2017-03-07 RX ADMIN — KETOROLAC TROMETHAMINE SCH MG: 30 INJECTION, SOLUTION INTRAMUSCULAR at 00:02

## 2017-03-07 RX ADMIN — LISINOPRIL AND HYDROCHLOROTHIAZIDE SCH EACH: 25; 20 TABLET ORAL at 07:58

## 2017-03-07 RX ADMIN — DOCUSATE SODIUM SCH MG: 100 CAPSULE, LIQUID FILLED ORAL at 07:58

## 2017-03-07 RX ADMIN — KETOROLAC TROMETHAMINE SCH MG: 30 INJECTION, SOLUTION INTRAMUSCULAR at 06:03

## 2017-03-07 RX ADMIN — HYDROMORPHONE HYDROCHLORIDE PRN MG: 1 INJECTION, SOLUTION INTRAMUSCULAR; INTRAVENOUS; SUBCUTANEOUS at 00:03

## 2017-03-07 RX ADMIN — LEVOTHYROXINE SODIUM SCH MCG: 50 TABLET ORAL at 06:22

## 2017-03-07 RX ADMIN — PREGABALIN SCH MG: 75 CAPSULE ORAL at 07:59

## 2017-03-07 RX ADMIN — ENOXAPARIN SODIUM SCH: 40 INJECTION SUBCUTANEOUS at 07:59

## 2017-03-07 RX ADMIN — HYDROMORPHONE HYDROCHLORIDE PRN MG: 1 INJECTION, SOLUTION INTRAMUSCULAR; INTRAVENOUS; SUBCUTANEOUS at 06:06

## 2017-03-07 RX ADMIN — HYDROCODONE BITARTRATE AND ACETAMINOPHEN PRN EACH: 10; 325 TABLET ORAL at 03:22

## 2017-03-07 RX ADMIN — PANTOPRAZOLE SODIUM SCH MG: 40 TABLET, DELAYED RELEASE ORAL at 06:42

## 2017-03-07 NOTE — P.DS
Providers


Date of admission: 


03/01/17 18:13





Expected date of discharge: 03/07/17


Attending physician: 


Chris Carcamo





Consults: 





 





03/01/17 18:14


Consult Physician Stat 


   Consulting Provider: Shankar Abdul


   Consult Reason/Comments: Right axilla abscess/cellulitis


   Do you want consulting provider notified?: Yes











Primary care physician: 


Chris Carcamo





[facility] Course: 


Patient is a 51-year-old  female with medical history significant for 

recent hospitalization for severe sepsis secondary to right axillary abscess 

status post incision and drainage on 02/05/2017 by Dr. Aden with fluid cultures 

positive for MRSA discharged home on 02/08/2017 with a PICC line and IV Teraflo 

for 24 weeks with local wound care per Dr. Heard from infectious disease 

service.  Patient presented to the emergency department with multiple 

complaints including on and off fevers, chills, swelling of her lower 

extremities, and increased incisional pain.  CT of chest upon admission showed 

interval improvement in the right axillary cellulitis/phlegmon pattern when 

compared to previous exam on February 4 with no drainable fluid collection 

identified.  Patient was admitted to the fourth floor and consult was requested 

for Dr. Abdul from infectious disease service per patient request.  Patient 

improved significantly during her hospital stay and plans were made for patient 

to follow-up in the wound healing center for gauze based negative pressure 

therapy system.  Patient was discharged home in stable condition with plans to 

finish up IV antibiotic therapy in the outpatient setting.





Discharge diagnoses:





1.  Abscess of right axilla with minimal cellulitis with history of MRSA status 

post incision and drainage on 02/05/2017. 


2.  Urinary hesitancy, present on admission, resolved.  


3.  Minimal edema to bilateral lower extremities.  


4.  Constipation, present on admission, resolved.  


5.  Hypokalemia, present on admission, resolved.


6.  Dehydration, resolved. 


7.  Hypothyroidism.  


8.  Fibromyalgia.  


9.  Hypertension.  


10.  Acute kidney injury, present on admission, suspect secondary to 

dehydration and intravascular volume depletion, resolved.


11.  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


12.  Anxiety and depression.  


13.  History of nicotine dependence.


14.  GERD.  





The above impression and plan have been discussed and directed by Dr. Carcamo. 

Lynette MCGOWAN acting as scribe for Dr. Carcamo.


Pertinent Studies: 


Chest x-ray; chest CT





Patient Condition at Discharge: Good





Plan - Discharge Summary


New Discharge Prescriptions: 


HYDROcodone/APAP 10-325MG [Norco ] 1 each PO Q4H PRN #60 tab


 PRN Reason: Pain


Meloxicam [Mobic] 15 mg PO DAILY #30 tab


Discharge Medication List





Lisinopril-Hctz 20-25 mg [Zestoretic 20-25] 1 tab PO DAILY 08/12/15 [History]


Pregabalin [Lyrica] 150 mg PO Q8H #90 capsule 01/27/16 [Rx]


Estrogens, Conjugated [Premarin] 0.3 mg PO DAILY 06/09/16 [History]


ARIPiprazole [Abilify] 20 mg PO DAILY 09/28/16 [History]


Venlafaxine HCl [Effexor XR] 225 mg PO DAILY 09/28/16 [History]


ARIPiprazole [Abilify] 5 mg PO DAILY 02/03/17 [History]


Baclofen 10 - 20 mg PO HS PRN 02/03/17 [History]


Dextroamphetamine/Amphetamine [Adderall] 30 mg PO TID 02/03/17 [History]


Fluticasone Nasal Spray [Flonase Nasal Spray] 2 spray EA NOSTRIL BID 02/03/17 [

History]


Levothyroxine Sodium [Synthroid] 50 mcg PO DAILY 02/03/17 [History]


Pantoprazole [Protonix] 40 mg PO DAILY 02/03/17 [History]


HYDROcodone/APAP 10-325MG [Norco ] 1 each PO Q4H PRN #60 tab 03/04/17 [Rx]


Meloxicam [Mobic] 15 mg PO DAILY #30 tab 03/04/17 [Rx]


Multivitamins, Thera [Multivitamin] 1 each PO DAILY@1200  tab 03/04/17 [Rx]


Ceftaroline Fosamil [Teflaro] 600 mg IVPB BID 03/07/17 [History]








Follow up Appointment(s)/Referral(s): 


Chris Carcamo DO [Primary Care Provider] - 2 Weeks (Office currently closed, 

please call and sched. appointment)


Shankar Abdul MD [STAFF PHYSICIAN] - 1 Week (Wound Healing Center)


[facility], [NON-STAFF] - 


Ambulatory/Diagnostic Orders: 


Basic Metabolic Panel [LAB.AMB] Location: Determined By Patient


Complete Blood Count w/diff [LAB.AMB] Location: Determined By Patient


Patient Instructions/Handouts:  MRSA (Methicillin Resistant Staphylococcus 

Aureus) (DC), Cellulitis (DC)


Activity/Diet/Wound Care/Special Instructions: 


Katie - Appointment set for 8 am Tuesday 3/7/17


Discharge Disposition: HOME WITH HOME HEALTH SERVICES

## 2017-03-22 ENCOUNTER — HOSPITAL ENCOUNTER (INPATIENT)
Dept: HOSPITAL 47 - EC | Age: 52
LOS: 2 days | Discharge: ANOTHER HOSPITAL | DRG: 871 | End: 2017-03-24
Payer: COMMERCIAL

## 2017-03-22 VITALS — BODY MASS INDEX: 32.5 KG/M2

## 2017-03-22 DIAGNOSIS — G89.29: ICD-10-CM

## 2017-03-22 DIAGNOSIS — A41.9: Primary | ICD-10-CM

## 2017-03-22 DIAGNOSIS — R65.21: ICD-10-CM
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
## 2017-03-22 DIAGNOSIS — N17.9: ICD-10-CM

## 2017-03-22 DIAGNOSIS — S72.002A: ICD-10-CM

## 2017-03-22 DIAGNOSIS — M79.7: ICD-10-CM

## 2017-03-22 DIAGNOSIS — R29.6: ICD-10-CM

## 2017-03-22 DIAGNOSIS — Z82.49: ICD-10-CM
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
## 2017-03-22 DIAGNOSIS — L02.411: ICD-10-CM

## 2017-03-22 DIAGNOSIS — D49.7: ICD-10-CM

## 2017-03-22 DIAGNOSIS — I11.9: ICD-10-CM

## 2017-03-22 DIAGNOSIS — F90.9: ICD-10-CM

## 2017-03-22 DIAGNOSIS — K21.9: ICD-10-CM

## 2017-03-22 DIAGNOSIS — Z79.899: ICD-10-CM

## 2017-03-22 DIAGNOSIS — I51.7: ICD-10-CM

## 2017-03-22 DIAGNOSIS — E87.8: ICD-10-CM

## 2017-03-22 DIAGNOSIS — E83.39: ICD-10-CM

## 2017-03-22 DIAGNOSIS — D50.9: ICD-10-CM

## 2017-03-22 DIAGNOSIS — B95.62: ICD-10-CM

## 2017-03-22 DIAGNOSIS — L03.111: ICD-10-CM

## 2017-03-22 DIAGNOSIS — Z87.891: ICD-10-CM

## 2017-03-22 DIAGNOSIS — Z79.1: ICD-10-CM

## 2017-03-22 DIAGNOSIS — B37.3: ICD-10-CM

## 2017-03-22 DIAGNOSIS — F32.9: ICD-10-CM

## 2017-03-22 DIAGNOSIS — E87.6: ICD-10-CM

## 2017-03-22 DIAGNOSIS — F41.9: ICD-10-CM

## 2017-03-22 DIAGNOSIS — E03.9: ICD-10-CM

## 2017-03-22 DIAGNOSIS — E87.2: ICD-10-CM

## 2017-03-22 LAB
ALP SERPL-CCNC: 109 U/L (ref 38–126)
ALT SERPL-CCNC: 89 U/L (ref 9–52)
ANION GAP SERPL CALC-SCNC: 12 MMOL/L
APTT BLD: 26.3 SEC (ref 22–30)
AST SERPL-CCNC: 123 U/L (ref 14–36)
BUN SERPL-SCNC: 20 MG/DL (ref 7–17)
CALCIUM SPEC-MCNC: 9.1 MG/DL (ref 8.4–10.2)
CELLS COUNTED: 100
CH: 31.7
CHCM: 33
CHLORIDE SERPL-SCNC: 107 MMOL/L (ref 98–107)
CK SERPL-CCNC: 343 U/L (ref 30–135)
CO2 SERPL-SCNC: 18 MMOL/L (ref 22–30)
ERYTHROCYTE [DISTWIDTH] IN BLOOD BY AUTOMATED COUNT: 3.56 M/UL (ref 3.8–5.4)
ERYTHROCYTE [DISTWIDTH] IN BLOOD: 14.2 % (ref 11.5–15.5)
GLUCOSE BLD-MCNC: 317 MG/DL (ref 75–99)
GLUCOSE SERPL-MCNC: 146 MG/DL (ref 74–99)
HCT VFR BLD AUTO: 34.5 % (ref 34–46)
HDW: 3.18
HGB BLD-MCNC: 11.4 GM/DL (ref 11.4–16)
INR PPP: 1.1 (ref ?–1.1)
MCH RBC QN AUTO: 31.9 PG (ref 25–35)
MCHC RBC AUTO-ENTMCNC: 33 G/DL (ref 31–37)
MCV RBC AUTO: 96.9 FL (ref 80–100)
NON-AFRICAN AMERICAN GFR(MDRD): 34
PARTICLE COUNT: 8807
PH UR: 5 [PH] (ref 5–8)
POTASSIUM SERPL-SCNC: 3.7 MMOL/L (ref 3.5–5.1)
PROT SERPL-MCNC: 6 G/DL (ref 6.3–8.2)
PT BLD: 10.6 SEC (ref 9–12)
RBC UR QL: 1 /HPF (ref 0–5)
SODIUM SERPL-SCNC: 137 MMOL/L (ref 137–145)
SP GR UR: 1.01 (ref 1–1.03)
SQUAMOUS UR QL AUTO: 2 /HPF (ref 0–4)
TROPONIN I SERPL-MCNC: 0.05 NG/ML (ref 0–0.03)
UA BILLING (MACRO VS. MICRO): (no result)
UROBILINOGEN UR QL STRIP: <2 MG/DL (ref ?–2)
WBC # BLD AUTO: 8.1 K/UL (ref 3.8–10.6)
WBC #/AREA URNS HPF: <1 /HPF (ref 0–5)
WBC (PEROX): 8.8

## 2017-03-22 PROCEDURE — 84100 ASSAY OF PHOSPHORUS: CPT

## 2017-03-22 PROCEDURE — 85730 THROMBOPLASTIN TIME PARTIAL: CPT

## 2017-03-22 PROCEDURE — 99291 CRITICAL CARE FIRST HOUR: CPT

## 2017-03-22 PROCEDURE — 84484 ASSAY OF TROPONIN QUANT: CPT

## 2017-03-22 PROCEDURE — 83605 ASSAY OF LACTIC ACID: CPT

## 2017-03-22 PROCEDURE — 70450 CT HEAD/BRAIN W/O DYE: CPT

## 2017-03-22 PROCEDURE — 85025 COMPLETE CBC W/AUTO DIFF WBC: CPT

## 2017-03-22 PROCEDURE — 85610 PROTHROMBIN TIME: CPT

## 2017-03-22 PROCEDURE — 80202 ASSAY OF VANCOMYCIN: CPT

## 2017-03-22 PROCEDURE — 82533 TOTAL CORTISOL: CPT

## 2017-03-22 PROCEDURE — 36415 COLL VENOUS BLD VENIPUNCTURE: CPT

## 2017-03-22 PROCEDURE — 83735 ASSAY OF MAGNESIUM: CPT

## 2017-03-22 PROCEDURE — 80053 COMPREHEN METABOLIC PANEL: CPT

## 2017-03-22 PROCEDURE — 71010: CPT

## 2017-03-22 PROCEDURE — 87502 INFLUENZA DNA AMP PROBE: CPT

## 2017-03-22 PROCEDURE — 96365 THER/PROPH/DIAG IV INF INIT: CPT

## 2017-03-22 PROCEDURE — 81001 URINALYSIS AUTO W/SCOPE: CPT

## 2017-03-22 PROCEDURE — 82553 CREATINE MB FRACTION: CPT

## 2017-03-22 PROCEDURE — 84132 ASSAY OF SERUM POTASSIUM: CPT

## 2017-03-22 PROCEDURE — 93005 ELECTROCARDIOGRAM TRACING: CPT

## 2017-03-22 PROCEDURE — 87040 BLOOD CULTURE FOR BACTERIA: CPT

## 2017-03-22 PROCEDURE — 96375 TX/PRO/DX INJ NEW DRUG ADDON: CPT

## 2017-03-22 PROCEDURE — 96361 HYDRATE IV INFUSION ADD-ON: CPT

## 2017-03-22 PROCEDURE — 82550 ASSAY OF CK (CPK): CPT

## 2017-03-22 PROCEDURE — 80048 BASIC METABOLIC PNL TOTAL CA: CPT

## 2017-03-22 PROCEDURE — 72125 CT NECK SPINE W/O DYE: CPT

## 2017-03-22 PROCEDURE — 87086 URINE CULTURE/COLONY COUNT: CPT

## 2017-03-22 PROCEDURE — 94640 AIRWAY INHALATION TREATMENT: CPT

## 2017-03-22 RX ADMIN — NICARDIPINE HYDROCHLORIDE SCH MLS/HR: 2.5 INJECTION INTRAVENOUS at 16:21

## 2017-03-22 RX ADMIN — PREGABALIN SCH: 75 CAPSULE ORAL at 23:09

## 2017-03-22 RX ADMIN — NICARDIPINE HYDROCHLORIDE SCH MLS/HR: 2.5 INJECTION INTRAVENOUS at 15:31

## 2017-03-22 RX ADMIN — CEFAZOLIN SCH MLS/HR: 330 INJECTION, POWDER, FOR SOLUTION INTRAMUSCULAR; INTRAVENOUS at 18:48

## 2017-03-22 NOTE — P.CONS
History of Present Illness





- Reason for Consult


Consult date: 03/22/17





- Chief Complaint


Chest pain and weakness





- History of Present Illness


51-year-old woman known to the infectious disease service regarding her recent 

hospitalization due to MRSA infection to her right axillary area.  She been 

seen by surgery and had incision and drainage to the right axillary abscess.  

As noted is receiving treatment for MRSA therapy with vancomycin, was being 

treated with Teflora but insurance would not cover the antibiotic any further.  

Was showing improvement.  Was evaluated in the outpatient setting with further 

improvement.  Within had the sudden onset of chest pain associated with 

shortness of breath and weakness.  Visiting nurse evaluated.  She had evidence 

of hypotension and was sent to the emergency center directly.  Patient did not 

believe that she was having high-grade fevers or chills.  No rigors were noted.

  But did start to feel suddenly very poorly with the above symptoms.  The 

emergency center despite fluids she remains somewhat hypotensive.  Chest was 

admitted to intensive care unit for potential vasopressor therapy and ongoing 

resuscitation with concerns to sepsis.


The patient did have chest pain but troponins were normal.  It is noted that 

she does have acute renal failure with a creatinine of 1.60.








Review of Systems





HEENT:Denies headache or acute visual change.  Denies sinus or mouth 

discomforts.  Denies neck stiffness or pain.  Denies significant oral cavity 

pain.  Denies difficulty on swallowing.





Lungs: Denies significant shortness of breath, cough, sputum production, or 

hemoptysis.





Cardiovascular: Was having chest pain it's improved.  Ongoing shortness of 

breath it's also improved.  Denied orthopnea PND or dyspnea on exertion








Gastrointestinal:Denies nausea, vomiting, diarrhea, constipation, hematemesis, 

melena, hematochezia.  No no significant change of bowel habit noticed.





Musculoskeletal: Has pain and discomfort to the right axillary area.  No other 

significant musculoskeletal complaints at this time.  





Skin: Is evidence of the significant abscess to the right axillary area status 

post incision and drainage with open ulcerations and tunneling.





Neuro: Complains of chronic pain but has no acute visual changes.  Denies any 

new onset weakness or difficulty with ambulation.  Denies falls or seizures.





Psychiatric: Seems to have ongoing anxiety but denied depression 





Endocrine: Complains of chronic fatigue and ongoing weight gain.





Past Medical History


Past Medical History: Eye Disorder, Fibromyalgia, Hypertension, Pneumonia, 

Thyroid Disorder


Additional Past Medical History / Comment(s): horners syndrom rt eye, 

HYPOGLYCEMIA, ANEMIA, MIGRAINES, pituitary tumor-no active chemo or radiation


History of Any Multi-Drug Resistant Organisms: MRSA


Year Discovered:: 03/22/2017


MDRO Source:: right armpit


Past Surgical History: Bariatric Surgery, Cholecystectomy, Hernia Repair, 

Hysterectomy, Orthopedic Surgery


Additional Past Surgical History / Comment(s): tummy tuck; prev. trach. foot 

surgery ( right side took out extra toe ), left foot surgery, TRACHEOSTOMY/ AND 

REMOVED


Past Anesthesia/Blood Transfusion Reactions: No Reported Reaction


Past Psychological History: ADD/ADHD, Anxiety, Depression


Additional Psychological History / Comment(s): Stopped tobacco smoking just in 

January of this year.  Medically disabled


Smoking Status: Former smoker


Past Alcohol Use History: Rare


Additional Past Alcohol Use History / Comment(s): started smoking 1983, smoked 

1 PPD; pt states she quit 1/15/17


Past Drug Use History: None Reported





- Past Family History


  ** Mother


Family Medical History: Coronary Artery Disease (CAD)





  ** Father


Family Medical History: Cancer





Medications and Allergies


Home Medications and Allergies Comment(s): 





 Laboratory Results











WBC  8.1 k/uL (3.8-10.6)   03/22/17  15:09    


 


RBC  3.56 m/uL (3.80-5.40)  L  03/22/17  15:09    


 


Hgb  11.4 gm/dL (11.4-16.0)   03/22/17  15:09    


 


Hct  34.5 % (34.0-46.0)   03/22/17  15:09    


 


MCV  96.9 fL (80.0-100.0)   03/22/17  15:09    


 


MCH  31.9 pg (25.0-35.0)   03/22/17  15:09    


 


MCHC  33.0 g/dL (31.0-37.0)   03/22/17  15:09    


 


RDW  14.2 % (11.5-15.5)   03/22/17  15:09    


 


Plt Count  188 k/uL (150-450)   03/22/17  15:09    


 


Neutrophils % (Manual)  68.0 %  03/22/17  15:09    


 


Band Neutrophils %  13.0 %  03/22/17  15:09    


 


Lymphocytes % (Manual)  9.0 %  03/22/17  15:09    


 


Monocytes % (Manual)  10.0 %  03/22/17  15:09    


 


Neutrophils # (Manual)  6.6 k/uL (1.3-7.7)   03/22/17  15:09    


 


Lymphocytes # (Manual)  0.7 k/uL (1.0-4.8)  L  03/22/17  15:09    


 


Monocytes # (Manual)  0.8 k/uL (0-1.0)   03/22/17  15:09    


 


Nucleated RBCs  0 /100 WBC (0-0)   03/22/17  15:09    


 


Toxic Vacuolation  Present   03/22/17  15:09    


 


Polychromasia  Present   03/22/17  15:09    


 


PT  10.6 sec (9.0-12.0)   03/22/17  15:09    


 


INR  1.1  (<1.1)   03/22/17  15:09    


 


APTT  26.3 sec (22.0-30.0)   03/22/17  15:09    


 


Sodium  137 mmol/L (137-145)   03/22/17  15:09    


 


Potassium  3.7 mmol/L (3.5-5.1)   03/22/17  15:09    


 


Chloride  107 mmol/L ()   03/22/17  15:09    


 


Carbon Dioxide  18 mmol/L (22-30)  L  03/22/17  15:09    


 


Anion Gap  12 mmol/L  03/22/17  15:09    


 


BUN  20 mg/dL (7-17)  H  03/22/17  15:09    


 


Creatinine  1.60 mg/dL (0.52-1.04)  H  03/22/17  15:09    


 


Est GFR (MDRD) Af Amer  42  (>60 ml/min/1.73 sqM)   03/22/17  15:09    


 


Est GFR (MDRD) Non-Af  34  (>60 ml/min/1.73 sqM)   03/22/17  15:09    


 


Glucose  146 mg/dL (74-99)  H  03/22/17  15:09    


 


POC Glucose (mg/dL)  317 mg/dL (75-99)  H  03/22/17  19:22    


 


POC Glu Operater ID  Susan Bell   03/22/17  19:22    


 


Plasma Lactic Acid Robert  1.8 mmol/L (0.7-2.0)   03/22/17  19:01    


 


Calcium  9.1 mg/dL (8.4-10.2)   03/22/17  15:09    


 


Total Bilirubin  0.4 mg/dL (0.2-1.3)   03/22/17  15:09    


 


AST  123 U/L (14-36)  H  03/22/17  15:09    


 


ALT  89 U/L (9-52)  H  03/22/17  15:09    


 


Alkaline Phosphatase  109 U/L ()   03/22/17  15:09    


 


Total Creatine Kinase  343 U/L ()  H  03/22/17  15:09    


 


CK-MB (CK-2)  4.9 ng/mL (0.0-2.4)  H*  03/22/17  15:09    


 


CK-MB (CK-2) Rel Index  1.4   03/22/17  15:09    


 


Troponin I  0.048 ng/mL (0.000-0.034)  H*  03/22/17  15:09    


 


Total Protein  6.0 g/dL (6.3-8.2)  L  03/22/17  15:09    


 


Albumin  3.2 g/dL (3.5-5.0)  L  03/22/17  15:09    


 


Cortisol  27 ug/dL  03/22/17  15:09    


 


Urine Color  Yellow   03/22/17  15:41    


 


Urine Appearance  Cloudy  (Clear)  H  03/22/17  15:41    


 


Urine pH  5.0  (5.0-8.0)   03/22/17  15:41    


 


Ur Specific Gravity  1.006  (1.001-1.035)   03/22/17  15:41    


 


Urine Protein  Trace  (Negative)  H  03/22/17  15:41    


 


Urine Glucose (UA)  Negative  (Negative)   03/22/17  15:41    


 


Urine Ketones  Negative  (Negative)   03/22/17  15:41    


 


Urine Blood  Negative  (Negative)   03/22/17  15:41    


 


Urine Nitrite  Negative  (Negative)   03/22/17  15:41    


 


Urine Bilirubin  Negative  (Negative)   03/22/17  15:41    


 


Urine Urobilinogen  <2.0 mg/dL (<2.0)   03/22/17  15:41    


 


Ur Leukocyte Esterase  Negative  (Negative)   03/22/17  15:41    


 


Urine RBC  1 /hpf (0-5)   03/22/17  15:41    


 


Urine WBC  <1 /hpf (0-5)   03/22/17  15:41    


 


Ur Squamous Epith Cells  2 /hpf (0-4)   03/22/17  15:41    


 


Amorphous Sediment  Rare /hpf (None)  H  03/22/17  15:41    


 


Urine Bacteria  Few /hpf (None)  H  03/22/17  15:41    


 


Hyaline Casts  2 /lpf (0-2)   03/22/17  15:41    


 


Urine Mucus  Rare /hpf (None)  H  03/22/17  15:41    


 


Random Vancomycin  7.2 ug/mL  03/22/17  15:09    











 Home Medications











 Medication  Instructions  Recorded  Confirmed  Type


 


Lisinopril-Hctz 20-25 mg 1 tab PO DAILY 08/12/15 03/22/17 History





[Zestoretic 20-25]    


 


Estrogens, Conjugated [Premarin] 0.3 mg PO DAILY 06/09/16 03/22/17 History


 


ARIPiprazole [Abilify] 20 mg PO DAILY 09/28/16 03/22/17 History


 


Venlafaxine HCl [Effexor XR] 225 mg PO DAILY 09/28/16 03/22/17 History


 


ARIPiprazole [Abilify] 5 mg PO DAILY 02/03/17 03/22/17 History


 


Baclofen 10 - 20 mg PO HS PRN 02/03/17 03/22/17 History


 


Dextroamphetamine/Amphetamine 30 mg PO TID 02/03/17 03/22/17 History





[Adderall]    


 


Fluticasone Nasal Spray [Flonase 2 spray EA NOSTRIL BID 02/03/17 03/22/17 

History





Nasal Spray]    


 


Levothyroxine Sodium [Synthroid] 50 mcg PO DAILY 02/03/17 03/22/17 History


 


Pantoprazole [Protonix] 40 mg PO DAILY 02/03/17 03/22/17 History


 


Furosemide [Lasix] 40 mg PO DAILY 03/14/17 03/22/17 History


 


Vancomycin 1,250 mg IV BID 03/14/17 03/22/17 History


 


Dextroamphetamine/Amphetamine 20 mg PO TID 03/22/17 03/22/17 History





[Adderall]    


 


HYDROcodone/APAP 10-325MG [Norco 1 tab PO Q4H PRN 03/22/17 03/22/17 History





]    


 


Multivitamins, Thera [Multivitamin 1 tab PO DAILY@1200 03/22/17 03/22/17 History





(formulary)]    











 Allergies











Allergy/AdvReac Type Severity Reaction Status Date / Time


 


No Known Allergies Allergy   Verified 03/22/17 15:25














Physical Exam


Vitals: 


 Vital Signs











  Pulse Resp BP Pulse Ox


 


 03/22/17 20:00  94  20  98/59  98


 


 03/22/17 19:22  102 H  20  


 


 03/22/17 18:44  85  14  106/57  99


 


 03/22/17 18:08  91  14  84/50  100


 


 03/22/17 17:35  80  15  83/52  100








 Intake and Output











 03/22/17 03/22/17 03/22/17





 06:59 14:59 22:59


 


Intake Total   1210


 


Output Total   1500


 


Balance   -290


 


Intake:   


 


  Intake, IV Titration   250





  Amount   


 


    Sodium Chloride 0.9% 1,   250





    000 ml @ 125 mls/hr IV .   





    Q8H CIPRIANO Rx#:[id number]   


 


  Oral   960


 


Output:   


 


  Urine   1500











11-year-old woman who is currently complaining of pain at her axillary area.  

The chest pain and shortness of breath have improved HEENT: Anicteric 

conjunctiva are pink and moist nasal mucosa grossly intact without significant 

lesions, there is no thrush.


Neck: The neck is supple without significant lymphadenopathy or thyromegaly.


Lungs: There is symmetrical air entry.  There are expiratory wheezes throughout 

the lung fields.  There is no bronchial sounds, no changes of egophony or 

dullness.


Heart: Regular rate and rhythm with an audible S1-S2, no S3 soft S4  There is 

no significant murmur click or rub, PMI was nondisplaced.


Abdomen: Obese, Positive bowel sounds soft and nontender without palpable 

masses or organomegaly.  There was no guarding or rebound.


Extremities: The upper extremities have excellent pulses they are symmetric, no 

significant petechiae or telangiectasia.  No splinter hemorrhages were noted.  

Lower extremities has minimal trace edema


The right axillary area shows evidence the recent surgical intervention.  

Please see the nursing photography for the exact location of the surgical 

wounds.  She is evidence of the 2 open areas.  The 2 most cephalad are 

communicating underneath the skin bridge.  Measuring at 6 x 2.5 x 3 but has a 8 

cm tunnel 





Neuro: Awake alert oriented to person place and time.  There are no acute new 

gross focal sensory motor deficits








Results


CBC & Chem 7: 


 03/22/17 15:09





 03/22/17 15:09


Labs: 


 Abnormal Lab Results - Last 24 Hours (Table)











  03/22/17 Range/Units





  19:22 


 


POC Glucose (mg/dL)  317 H  (75-99)  mg/dL








 Laboratory Results











WBC  8.1 k/uL (3.8-10.6)   03/22/17  15:09    


 


RBC  3.56 m/uL (3.80-5.40)  L  03/22/17  15:09    


 


Hgb  11.4 gm/dL (11.4-16.0)   03/22/17  15:09    


 


Hct  34.5 % (34.0-46.0)   03/22/17  15:09    


 


MCV  96.9 fL (80.0-100.0)   03/22/17  15:09    


 


MCH  31.9 pg (25.0-35.0)   03/22/17  15:09    


 


MCHC  33.0 g/dL (31.0-37.0)   03/22/17  15:09    


 


RDW  14.2 % (11.5-15.5)   03/22/17  15:09    


 


Plt Count  188 k/uL (150-450)   03/22/17  15:09    


 


Neutrophils % (Manual)  68.0 %  03/22/17  15:09    


 


Band Neutrophils %  13.0 %  03/22/17  15:09    


 


Lymphocytes % (Manual)  9.0 %  03/22/17  15:09    


 


Monocytes % (Manual)  10.0 %  03/22/17  15:09    


 


Neutrophils # (Manual)  6.6 k/uL (1.3-7.7)   03/22/17  15:09    


 


Lymphocytes # (Manual)  0.7 k/uL (1.0-4.8)  L  03/22/17  15:09    


 


Monocytes # (Manual)  0.8 k/uL (0-1.0)   03/22/17  15:09    


 


Nucleated RBCs  0 /100 WBC (0-0)   03/22/17  15:09    


 


Toxic Vacuolation  Present   03/22/17  15:09    


 


Polychromasia  Present   03/22/17  15:09    


 


PT  10.6 sec (9.0-12.0)   03/22/17  15:09    


 


INR  1.1  (<1.1)   03/22/17  15:09    


 


APTT  26.3 sec (22.0-30.0)   03/22/17  15:09    


 


Sodium  137 mmol/L (137-145)   03/22/17  15:09    


 


Potassium  3.7 mmol/L (3.5-5.1)   03/22/17  15:09    


 


Chloride  107 mmol/L ()   03/22/17  15:09    


 


Carbon Dioxide  18 mmol/L (22-30)  L  03/22/17  15:09    


 


Anion Gap  12 mmol/L  03/22/17  15:09    


 


BUN  20 mg/dL (7-17)  H  03/22/17  15:09    


 


Creatinine  1.60 mg/dL (0.52-1.04)  H  03/22/17  15:09    


 


Est GFR (MDRD) Af Amer  42  (>60 ml/min/1.73 sqM)   03/22/17  15:09    


 


Est GFR (MDRD) Non-Af  34  (>60 ml/min/1.73 sqM)   03/22/17  15:09    


 


Glucose  146 mg/dL (74-99)  H  03/22/17  15:09    


 


POC Glucose (mg/dL)  317 mg/dL (75-99)  H  03/22/17  19:22    


 


POC Glu Operater ID  RaySusan   03/22/17  19:22    


 


Plasma Lactic Acid Robert  1.8 mmol/L (0.7-2.0)   03/22/17  19:01    


 


Calcium  9.1 mg/dL (8.4-10.2)   03/22/17  15:09    


 


Total Bilirubin  0.4 mg/dL (0.2-1.3)   03/22/17  15:09    


 


AST  123 U/L (14-36)  H  03/22/17  15:09    


 


ALT  89 U/L (9-52)  H  03/22/17  15:09    


 


Alkaline Phosphatase  109 U/L ()   03/22/17  15:09    


 


Total Creatine Kinase  343 U/L ()  H  03/22/17  15:09    


 


CK-MB (CK-2)  4.9 ng/mL (0.0-2.4)  H*  03/22/17  15:09    


 


CK-MB (CK-2) Rel Index  1.4   03/22/17  15:09    


 


Troponin I  0.048 ng/mL (0.000-0.034)  H*  03/22/17  15:09    


 


Total Protein  6.0 g/dL (6.3-8.2)  L  03/22/17  15:09    


 


Albumin  3.2 g/dL (3.5-5.0)  L  03/22/17  15:09    


 


Cortisol  27 ug/dL  03/22/17  15:09    


 


Urine Color  Yellow   03/22/17  15:41    


 


Urine Appearance  Cloudy  (Clear)  H  03/22/17  15:41    


 


Urine pH  5.0  (5.0-8.0)   03/22/17  15:41    


 


Ur Specific Gravity  1.006  (1.001-1.035)   03/22/17  15:41    


 


Urine Protein  Trace  (Negative)  H  03/22/17  15:41    


 


Urine Glucose (UA)  Negative  (Negative)   03/22/17  15:41    


 


Urine Ketones  Negative  (Negative)   03/22/17  15:41    


 


Urine Blood  Negative  (Negative)   03/22/17  15:41    


 


Urine Nitrite  Negative  (Negative)   03/22/17  15:41    


 


Urine Bilirubin  Negative  (Negative)   03/22/17  15:41    


 


Urine Urobilinogen  <2.0 mg/dL (<2.0)   03/22/17  15:41    


 


Ur Leukocyte Esterase  Negative  (Negative)   03/22/17  15:41    


 


Urine RBC  1 /hpf (0-5)   03/22/17  15:41    


 


Urine WBC  <1 /hpf (0-5)   03/22/17  15:41    


 


Ur Squamous Epith Cells  2 /hpf (0-4)   03/22/17  15:41    


 


Amorphous Sediment  Rare /hpf (None)  H  03/22/17  15:41    


 


Urine Bacteria  Few /hpf (None)  H  03/22/17  15:41    


 


Hyaline Casts  2 /lpf (0-2)   03/22/17  15:41    


 


Urine Mucus  Rare /hpf (None)  H  03/22/17  15:41    


 


Random Vancomycin  7.2 ug/mL  03/22/17  15:09    














Assessment and Plan


(1) Abscess of right axilla


Narrative/Plan: 


51-year-old  female presents to the emergency center from home after 

evaluation by her home care nurse.  Patient was complaining of some chest pain 

some increasing shortness of breath and significant weakness.  She was 

suffering falls at home.  In the emergency center as noted there was evidence 

of a fibular fracture.


The patient admission to the emergency center without evidence of hypotension.  

This was persistent despite resuscitation.  She subsequently has been admitted 

to the intensive care unit for ongoing resuscitation and vasopressor therapy if 

needed.  Patient is feeling slightly better this evening.  This concerned about 

pain and lack of sleep.


Local wound care to the arm will be done with the Aquacel silver rope.  This 

can be changed every other day.  Wound care center is trying to obtain the 

gauze base negative pressure therapy system


At admission the lactic acid was elevated is now improved with hydration





At this time concern will be to a gram-negative superinfection while she has 

been on and by therapy with vancomycin.  Consequently Fortaz is added while 

cultures are pending.  Multiple cultures are been requested


Patient does have difficulty with recurrent vaginal yeast infection due to the 

added back therapy and Diflucan is added





Patient is a history of pituitary tumor and may need further evaluation and 

imaging of this if she continues to have difficulty with falls and weakness





Evidence of acute renal failure since her last recent blood work.  Hydration is 

in process.


Status: Acute   





(2) MRSA infection


Status: Acute   





(3) Gram-negative infection


Status: Acute

## 2017-03-22 NOTE — ED
General Adult HPI





- General


Chief complaint: Weakness


Stated complaint: Weakness


Time Seen by Provider: 03/22/17 15:00


Source: patient, EMS, RN notes reviewed


Mode of arrival: EMS


Limitations: no limitations





- History of Present Illness


Initial comments: 





This is a 51-year-old female who comes into the emergency department because 

last night she started coming so weak she was falling over.  Patient states she 

has been treating for an abscess under her right arm since January.  Patient 

has a PICC line that she has been receiving vancomycin through.  Patient states 

she's fallen over about 4 times yesterday and a couple times today.  Patient 

states she did hit her head and she is having some neck pain.  Patient denies 

any chest pain or difficulty breathing.  Patient states she does have some 

upper abdominal discomfort occasionally.  Patient denies any vomiting or 

diarrhea this last few days.  Patient denies headache patient denies numbness 

weakness.





- Related Data


 Home Medications











 Medication  Instructions  Recorded  Confirmed


 


Lisinopril-Hctz 20-25 mg 1 tab PO DAILY 08/12/15 03/22/17





[Zestoretic 20-25]   


 


Estrogens, Conjugated [Premarin] 0.3 mg PO DAILY 06/09/16 03/22/17


 


ARIPiprazole [Abilify] 20 mg PO DAILY 09/28/16 03/22/17


 


Venlafaxine HCl [Effexor XR] 225 mg PO DAILY 09/28/16 03/22/17


 


ARIPiprazole [Abilify] 5 mg PO DAILY 02/03/17 03/22/17


 


Baclofen 10 - 20 mg PO HS PRN 02/03/17 03/22/17


 


Dextroamphetamine/Amphetamine 30 mg PO TID 02/03/17 03/22/17





[Adderall]   


 


Fluticasone Nasal Spray [Flonase 2 spray EA NOSTRIL BID 02/03/17 03/22/17





Nasal Spray]   


 


Levothyroxine Sodium [Synthroid] 50 mcg PO DAILY 02/03/17 03/22/17


 


Pantoprazole [Protonix] 40 mg PO DAILY 02/03/17 03/22/17


 


Furosemide [Lasix] 40 mg PO DAILY 03/14/17 03/22/17


 


Vancomycin 1,250 mg IV BID 03/14/17 03/22/17


 


Dextroamphetamine/Amphetamine 20 mg PO TID 03/22/17 03/22/17





[Adderall]   


 


HYDROcodone/APAP 10-325MG [Norco 1 tab PO Q4H PRN 03/22/17 03/22/17





]   


 


Multivitamins, Thera [Multivitamin 1 tab PO DAILY@1200 03/22/17 03/22/17





(formulary)]   








 Previous Rx's











 Medication  Instructions  Recorded


 


Pregabalin [Lyrica] 150 mg PO Q8H #90 capsule 01/27/16


 


Meloxicam [Mobic] 15 mg PO DAILY #30 tab 03/04/17


 


Fluconazole 200 mg PO DAILY #7 tab 03/20/17











 Allergies











Allergy/AdvReac Type Severity Reaction Status Date / Time


 


No Known Allergies Allergy   Verified 03/22/17 15:25














Review of Systems


ROS Statement: 


Those systems with pertinent positive or pertinent negative responses have been 

documented in the HPI.





ROS Other: All systems not noted in ROS Statement are negative.





Past Medical History


Past Medical History: Eye Disorder, Fibromyalgia, Hypertension, Pneumonia, 

Thyroid Disorder


Additional Past Medical History / Comment(s): horners syndrom rt eye, 

HYPOGLYCEMIA, ANEMIA, MIGRAINES, pituitary tumor-no active chemo or radiation


History of Any Multi-Drug Resistant Organisms: MRSA


Date of last positivie culture/infection: 03/22/2017


MDRO Source:: right armpit


Past Surgical History: Bariatric Surgery, Cholecystectomy, Hernia Repair, 

Hysterectomy, Orthopedic Surgery


Additional Past Surgical History / Comment(s): tummy tuck; prev. trach. foot 

surgery ( right side took out extra toe ), left foot surgery, TRACHEOSTOMY/ AND 

REMOVED


Past Anesthesia/Blood Transfusion Reactions: No Reported Reaction


Past Psychological History: ADD/ADHD, Anxiety, Depression


Additional Psychological History / Comment(s): Stopped tobacco smoking just in 

January of this year.  Medically disabled


Smoking Status: Former smoker


Past Alcohol Use History: Rare


Additional Past Alcohol Use History / Comment(s): started smoking 1983, smoked 

1 PPD; pt states she quit 1/15/17


Past Drug Use History: None Reported





- Past Family History


  ** Mother


Family Medical History: Coronary Artery Disease (CAD)





  ** Father


Family Medical History: Cancer





General Exam





- General Exam Comments


Initial Comments: 





GENERAL:


Patient is well-developed and well-nourished.  Patient is nontoxic and well-

hydrated and is in moderate distress.





ENT:


Neck is soft and supple.  No significant lymphadenopathy is noted.  Oropharynx 

is clear.  Moist mucous membranes.  Patient complains of bilateral neck.  C-

collar is in place.





EYES:


The sclera were anicteric and conjunctiva were pink and moist.  Extraocular 

movements were intact and pupils were equal round and reactive to light.  

Eyelids were unremarkable.





PULMONARY:


Unlabored respirations.  Good breath sounds bilaterally.  No audible rales 

rhonchi or wheezing was noted.





CARDIOVASCULAR:


There is a regular rate and rhythm without any murmurs gallops or rubs. 





ABDOMEN:


Slight epigastric abdominal pain.  No palpable organomegaly was noted.  There 

is no palpable pulsatile mass.





SKIN:


Skin is clear with no lesions or rashes and otherwise unremarkable.  I looked 

at the open wound on her right arm that does not appear to be any erythema or 

swelling in the area.





NEUROLOGIC:


Patient is alert and oriented x3.  Cranial nerves II through XII are grossly 

intact.  Motor and sensory are also intact.  Normal speech, volume and content.

  Symmetrical smile.  





MUSCULOSKELETAL:


Normal extremities with adequate strength and full range of motion.  





LYMPHATICS:


No significant lymphadenopathy is noted





PSYCHIATRIC:


Normal psychiatric evaluation.  Normal interpersonal interactions appears 

functionally intact in deals appropriately with others.  No signs of 

depression.  No signs of anxiety.  


Limitations: no limitations





Course


 Vital Signs











  03/22/17 03/22/17 03/22/17





  14:37 14:50 15:01


 


Temperature 98.1 F  


 


Pulse Rate 93 91 


 


Pulse Rate [   89





Bilateral   





Radial]   


 


Respiratory 15  





Rate   


 


Blood Pressure 66/42 80/40 


 


O2 Sat by Pulse 97 96 





Oximetry   














  03/22/17 03/22/17 03/22/17





  15:11 15:20 15:40


 


Temperature   


 


Pulse Rate 90 92 89


 


Pulse Rate [   





Bilateral   





Radial]   


 


Respiratory   15





Rate   


 


Blood Pressure 78/41 93/58 92/42


 


O2 Sat by Pulse  100 99





Oximetry   














  03/22/17 03/22/17 03/22/17





  16:22 16:33 17:14


 


Temperature  97.0 F L 


 


Pulse Rate 76 78 83


 


Pulse Rate [   





Bilateral   





Radial]   


 


Respiratory 18 18 16





Rate   


 


Blood Pressure 87/46 82/45 90/47


 


O2 Sat by Pulse 96 98 95





Oximetry   














Medical Decision Making





- Medical Decision Making





EKG shows normal sinus rhythm at 86 bpm MT interval is 150 QRS is 100 QT 

interval 422 QTC is 504.  Patient's EKG shows no ST segment elevation or 

depression or T wave abnormalities are noted.





I spoke with Dr. Abdul about the case and he wanted the patient started on 

Fortaz for possible gram-negative superinfection.





I spoke with Dr. Carcamo and he agreed to admit the patient I wrote admitting 

orders.


 


I spoke with Dr. Denson he agreed to accept the admission I admitted the 

patient the ICU.  I also consult orthopedic





- Lab Data


Result diagrams: 


 03/22/17 15:09





 03/22/17 15:09


 Lab Results











  03/22/17 03/22/17 03/22/17 Range/Units





  15:09 15:09 15:09 


 


WBC   8.1   (3.8-10.6)  k/uL


 


RBC   3.56 L   (3.80-5.40)  m/uL


 


Hgb   11.4   (11.4-16.0)  gm/dL


 


Hct   34.5   (34.0-46.0)  %


 


MCV   96.9   (80.0-100.0)  fL


 


MCH   31.9   (25.0-35.0)  pg


 


MCHC   33.0   (31.0-37.0)  g/dL


 


RDW   14.2   (11.5-15.5)  %


 


Plt Count   188   (150-450)  k/uL


 


Neutrophils % (Manual)   68.0   %


 


Band Neutrophils %   13.0   %


 


Lymphocytes % (Manual)   9.0   %


 


Monocytes % (Manual)   10.0   %


 


Neutrophils # (Manual)   6.6   (1.3-7.7)  k/uL


 


Lymphocytes # (Manual)   0.7 L   (1.0-4.8)  k/uL


 


Monocytes # (Manual)   0.8   (0-1.0)  k/uL


 


Nucleated RBCs   0   (0-0)  /100 WBC


 


Toxic Vacuolation   Present   


 


Polychromasia   Present   


 


PT     (9.0-12.0)  sec


 


INR     (<1.1)  


 


APTT     (22.0-30.0)  sec


 


Sodium    137  (137-145)  mmol/L


 


Potassium    3.7  (3.5-5.1)  mmol/L


 


Chloride    107  ()  mmol/L


 


Carbon Dioxide    18 L  (22-30)  mmol/L


 


Anion Gap    12  mmol/L


 


BUN    20 H  (7-17)  mg/dL


 


Creatinine    1.60 H  (0.52-1.04)  mg/dL


 


Est GFR (MDRD) Af Amer    42  (>60 ml/min/1.73 sqM)  


 


Est GFR (MDRD) Non-Af    34  (>60 ml/min/1.73 sqM)  


 


Glucose    146 H  (74-99)  mg/dL


 


Plasma Lactic Acid Robert     (0.7-2.0)  mmol/L


 


Calcium    9.1  (8.4-10.2)  mg/dL


 


Total Bilirubin    0.4  (0.2-1.3)  mg/dL


 


AST    123 H  (14-36)  U/L


 


ALT    89 H  (9-52)  U/L


 


Alkaline Phosphatase    109  ()  U/L


 


Total Creatine Kinase  343 H    ()  U/L


 


CK-MB (CK-2)  4.9 H*    (0.0-2.4)  ng/mL


 


CK-MB (CK-2) Rel Index  1.4    


 


Troponin I  0.048 H*    (0.000-0.034)  ng/mL


 


Total Protein    6.0 L  (6.3-8.2)  g/dL


 


Albumin    3.2 L  (3.5-5.0)  g/dL


 


Cortisol    27  ug/dL


 


Urine Color     


 


Urine Appearance     (Clear)  


 


Urine pH     (5.0-8.0)  


 


Ur Specific Gravity     (1.001-1.035)  


 


Urine Protein     (Negative)  


 


Urine Glucose (UA)     (Negative)  


 


Urine Ketones     (Negative)  


 


Urine Blood     (Negative)  


 


Urine Nitrite     (Negative)  


 


Urine Bilirubin     (Negative)  


 


Urine Urobilinogen     (<2.0)  mg/dL


 


Ur Leukocyte Esterase     (Negative)  


 


Urine RBC     (0-5)  /hpf


 


Urine WBC     (0-5)  /hpf


 


Ur Squamous Epith Cells     (0-4)  /hpf


 


Amorphous Sediment     (None)  /hpf


 


Urine Bacteria     (None)  /hpf


 


Hyaline Casts     (0-2)  /lpf


 


Urine Mucus     (None)  /hpf














  03/22/17 03/22/17 03/22/17 Range/Units





  15:09 15:09 15:41 


 


WBC     (3.8-10.6)  k/uL


 


RBC     (3.80-5.40)  m/uL


 


Hgb     (11.4-16.0)  gm/dL


 


Hct     (34.0-46.0)  %


 


MCV     (80.0-100.0)  fL


 


MCH     (25.0-35.0)  pg


 


MCHC     (31.0-37.0)  g/dL


 


RDW     (11.5-15.5)  %


 


Plt Count     (150-450)  k/uL


 


Neutrophils % (Manual)     %


 


Band Neutrophils %     %


 


Lymphocytes % (Manual)     %


 


Monocytes % (Manual)     %


 


Neutrophils # (Manual)     (1.3-7.7)  k/uL


 


Lymphocytes # (Manual)     (1.0-4.8)  k/uL


 


Monocytes # (Manual)     (0-1.0)  k/uL


 


Nucleated RBCs     (0-0)  /100 WBC


 


Toxic Vacuolation     


 


Polychromasia     


 


PT   10.6   (9.0-12.0)  sec


 


INR   1.1   (<1.1)  


 


APTT   26.3   (22.0-30.0)  sec


 


Sodium     (137-145)  mmol/L


 


Potassium     (3.5-5.1)  mmol/L


 


Chloride     ()  mmol/L


 


Carbon Dioxide     (22-30)  mmol/L


 


Anion Gap     mmol/L


 


BUN     (7-17)  mg/dL


 


Creatinine     (0.52-1.04)  mg/dL


 


Est GFR (MDRD) Af Amer     (>60 ml/min/1.73 sqM)  


 


Est GFR (MDRD) Non-Af     (>60 ml/min/1.73 sqM)  


 


Glucose     (74-99)  mg/dL


 


Plasma Lactic Acid Robert  2.3 H*    (0.7-2.0)  mmol/L


 


Calcium     (8.4-10.2)  mg/dL


 


Total Bilirubin     (0.2-1.3)  mg/dL


 


AST     (14-36)  U/L


 


ALT     (9-52)  U/L


 


Alkaline Phosphatase     ()  U/L


 


Total Creatine Kinase     ()  U/L


 


CK-MB (CK-2)     (0.0-2.4)  ng/mL


 


CK-MB (CK-2) Rel Index     


 


Troponin I     (0.000-0.034)  ng/mL


 


Total Protein     (6.3-8.2)  g/dL


 


Albumin     (3.5-5.0)  g/dL


 


Cortisol     ug/dL


 


Urine Color    Yellow  


 


Urine Appearance    Cloudy H  (Clear)  


 


Urine pH    5.0  (5.0-8.0)  


 


Ur Specific Gravity    1.006  (1.001-1.035)  


 


Urine Protein    Trace H  (Negative)  


 


Urine Glucose (UA)    Negative  (Negative)  


 


Urine Ketones    Negative  (Negative)  


 


Urine Blood    Negative  (Negative)  


 


Urine Nitrite    Negative  (Negative)  


 


Urine Bilirubin    Negative  (Negative)  


 


Urine Urobilinogen    <2.0  (<2.0)  mg/dL


 


Ur Leukocyte Esterase    Negative  (Negative)  


 


Urine RBC    1  (0-5)  /hpf


 


Urine WBC    <1  (0-5)  /hpf


 


Ur Squamous Epith Cells    2  (0-4)  /hpf


 


Amorphous Sediment    Rare H  (None)  /hpf


 


Urine Bacteria    Few H  (None)  /hpf


 


Hyaline Casts    2  (0-2)  /lpf


 


Urine Mucus    Rare H  (None)  /hpf














Critical Care Time


Critical Care Time: Yes


Total Critical Care Time: 35





Disposition


Clinical Impression: 


 Left fibular fracture, Sepsis





Disposition: ADMITTED AS IP TO THIS HOSP


Referrals: 


Chris Carcamo DO [Primary Care Provider] - 1-2 days


Time of Disposition: 17:01

## 2017-03-22 NOTE — XR
EXAMINATION TYPE: XR chest 1V portable

 

DATE OF EXAM: 3/22/2017 5:40 PM

 

COMPARISON: 3/1/2017

 

HISTORY: Sepsis

 

TECHNIQUE: Single frontal view of the chest is obtained.

 

FINDINGS:  Heart and mediastinum are normal. Lungs are clear. Costophrenic angles are clear. There ar
e no hilar masses. There are chest leads. Bony thorax is intact.

 

IMPRESSION:  Normal chest. No change.

## 2017-03-22 NOTE — XR
EXAMINATION TYPE: XR knee complete LT

 

DATE OF EXAM: 3/22/2017 4:04 PM

 

CLINICAL HISTORY: Fall injury with left knee pain.

 

TECHNIQUE:  Three views of the left knee are obtained.

 

COMPARISON: None.

 

FINDINGS:  There is no acute minimally displaced spiral type fracture through the fibular head or pro
ximal metaphysis.  The tri-compartment joint spaces appear within normal limits.  The overlying soft 
tissue appears unremarkable.

 

IMPRESSION:  There is acute minimally displaced spiral fracture through the proximal fibular metaphys
is.

 

(Initial encounter close type post traumatic fracture)

## 2017-03-22 NOTE — CT
EXAMINATION TYPE: CT brain cspine wo con

 

DATE OF EXAM: 3/22/2017 4:11 PM

 

COMPARISON: CT brain August 12, 2015.

 

HISTORY: head and neck pain after fall injury.

 

CT DLP: 1577.1 mGycm. Automated Exposure Control for Dose Reduction was Utilized.

 

 

TECHNIQUE: CT scan of the head and cervical spine are performed without contrast.

 

FINDINGS:   There is no acute intracranial hemorrhage, mass effect, or midline shift identified.  The
 ventricles and sulci are within normal limits in size.  The globes are intact and the visualized sin
uses are clear. The calvarium is intact.

 

Cervical spine is visualized in its entirety from C1 through upper thoracic levels and demonstrates s
traightened alignment without evidence of acute fracture or dislocation.  Prevertebral soft tissue ap
pears within normal limits.  The C1-C2 articulation is within normal limits on the coronal images.

 

Vertebral body heights are maintained. There is moderate disc space narrowing with mild to moderate s
purring at C5-C6 and C6-C7 level. Spinal canal is grossly preserved. Thyroid gland is felt within nor
mal limits. Visualized lung apices are clear. Note is made of air-fluid level in visualized prominent
 proximal esophagus. Need for further workup should be based on clinical correlation.

 

IMPRESSION:

1. There is no acute fracture or dislocation evident in the cervical spine. Attention to proximal eso
phagus as noted above.

2. No acute intracranial hemorrhage, mass effect, or midline shift is seen.

## 2017-03-23 LAB
ANION GAP SERPL CALC-SCNC: 9 MMOL/L
APTT BLD: 27.3 SEC (ref 22–30)
BUN SERPL-SCNC: 18 MG/DL (ref 7–17)
CALCIUM SPEC-MCNC: 8.6 MG/DL (ref 8.4–10.2)
CELLS COUNTED: 200
CH: 31.8
CHCM: 32.9
CHLORIDE SERPL-SCNC: 115 MMOL/L (ref 98–107)
CO2 SERPL-SCNC: 16 MMOL/L (ref 22–30)
ERYTHROCYTE [DISTWIDTH] IN BLOOD BY AUTOMATED COUNT: 3.19 M/UL (ref 3.8–5.4)
ERYTHROCYTE [DISTWIDTH] IN BLOOD: 14.6 % (ref 11.5–15.5)
GLUCOSE BLD-MCNC: 110 MG/DL (ref 75–99)
GLUCOSE BLD-MCNC: 133 MG/DL (ref 75–99)
GLUCOSE BLD-MCNC: 87 MG/DL (ref 75–99)
GLUCOSE SERPL-MCNC: 167 MG/DL (ref 74–99)
HCT VFR BLD AUTO: 31.1 % (ref 34–46)
HDW: 3.34
HGB BLD-MCNC: 10 GM/DL (ref 11.4–16)
INR PPP: 1 (ref ?–1.1)
MAGNESIUM SPEC-SCNC: 2.3 MG/DL (ref 1.6–2.3)
MCH RBC QN AUTO: 31.3 PG (ref 25–35)
MCHC RBC AUTO-ENTMCNC: 32.1 G/DL (ref 31–37)
MCV RBC AUTO: 97.4 FL (ref 80–100)
NON-AFRICAN AMERICAN GFR(MDRD): >60
PHOSPHATE SERPL-MCNC: 2.3 MG/DL (ref 2.5–4.5)
POTASSIUM SERPL-SCNC: 3.2 MMOL/L (ref 3.5–5.1)
PT BLD: 10.5 SEC (ref 9–12)
SODIUM SERPL-SCNC: 140 MMOL/L (ref 137–145)
WBC # BLD AUTO: 7.5 K/UL (ref 3.8–10.6)
WBC (PEROX): 7.97

## 2017-03-23 RX ADMIN — FLUCONAZOLE SCH MG: 100 TABLET ORAL at 08:25

## 2017-03-23 RX ADMIN — FLUCONAZOLE SCH MG: 100 TABLET ORAL at 00:57

## 2017-03-23 RX ADMIN — HEPARIN SODIUM SCH UNIT: 5000 INJECTION, SOLUTION INTRAVENOUS; SUBCUTANEOUS at 16:13

## 2017-03-23 RX ADMIN — VENLAFAXINE HYDROCHLORIDE SCH MG: 75 CAPSULE, EXTENDED RELEASE ORAL at 08:26

## 2017-03-23 RX ADMIN — PREGABALIN SCH MG: 75 CAPSULE ORAL at 23:57

## 2017-03-23 RX ADMIN — PREGABALIN SCH MG: 75 CAPSULE ORAL at 08:24

## 2017-03-23 RX ADMIN — HEPARIN SODIUM SCH UNIT: 5000 INJECTION, SOLUTION INTRAVENOUS; SUBCUTANEOUS at 00:57

## 2017-03-23 RX ADMIN — CEFAZOLIN SCH MLS/HR: 330 INJECTION, POWDER, FOR SOLUTION INTRAMUSCULAR; INTRAVENOUS at 12:59

## 2017-03-23 RX ADMIN — PREGABALIN SCH MG: 75 CAPSULE ORAL at 01:12

## 2017-03-23 RX ADMIN — HYDROCODONE BITARTRATE AND ACETAMINOPHEN PRN EACH: 10; 325 TABLET ORAL at 10:12

## 2017-03-23 RX ADMIN — HYDROCODONE BITARTRATE AND ACETAMINOPHEN PRN EACH: 10; 325 TABLET ORAL at 06:23

## 2017-03-23 RX ADMIN — CEFAZOLIN SCH MLS/HR: 330 INJECTION, POWDER, FOR SOLUTION INTRAMUSCULAR; INTRAVENOUS at 06:36

## 2017-03-23 RX ADMIN — CEFAZOLIN SCH MLS/HR: 330 INJECTION, POWDER, FOR SOLUTION INTRAMUSCULAR; INTRAVENOUS at 22:09

## 2017-03-23 RX ADMIN — FLUTICASONE PROPIONATE SCH SPRAY: 50 SPRAY, METERED NASAL at 20:24

## 2017-03-23 RX ADMIN — ESTROGENS, CONJUGATED SCH MG: 0.3 TABLET, FILM COATED ORAL at 08:25

## 2017-03-23 RX ADMIN — HYDROCODONE BITARTRATE AND ACETAMINOPHEN PRN EACH: 10; 325 TABLET ORAL at 02:13

## 2017-03-23 RX ADMIN — THERA TABS SCH EACH: TAB at 12:59

## 2017-03-23 RX ADMIN — HYDROCODONE BITARTRATE AND ACETAMINOPHEN PRN EACH: 10; 325 TABLET ORAL at 20:24

## 2017-03-23 RX ADMIN — MELOXICAM SCH MG: 7.5 TABLET ORAL at 08:26

## 2017-03-23 RX ADMIN — HEPARIN SODIUM SCH UNIT: 5000 INJECTION, SOLUTION INTRAVENOUS; SUBCUTANEOUS at 08:24

## 2017-03-23 RX ADMIN — PREGABALIN SCH MG: 75 CAPSULE ORAL at 16:13

## 2017-03-23 RX ADMIN — FLUTICASONE PROPIONATE SCH SPRAY: 50 SPRAY, METERED NASAL at 08:26

## 2017-03-23 RX ADMIN — HYDROCODONE BITARTRATE AND ACETAMINOPHEN PRN EACH: 10; 325 TABLET ORAL at 17:16

## 2017-03-23 RX ADMIN — LEVOTHYROXINE SODIUM SCH MCG: 50 TABLET ORAL at 06:21

## 2017-03-23 NOTE — XR
EXAMINATION TYPE: XR shoulder limited RT

 

DATE OF EXAM: 3/23/2017 10:04 AM

 

CLINICAL HISTORY: Right shoulder pain after fall injury.

 

TECHNIQUE: 2 views of the right shoulder are obtained.

 

COMPARISON: None. 

 

FINDINGS:  There is no acute fracture/dislocation evident in the right shoulder.  The acromioclavicul
ar and glenohumeral joint spaces appear within normal limits.  The visualized ribs are intact and unr
emarkable.

 

IMPRESSION:  There is no acute fracture or dislocation in the right shoulder.

## 2017-03-23 NOTE — P.CNPUL
History of Present Illness


Consult date: 03/23/17


Requesting physician: Chris Carcamo


Reason for consult: other (acute sepsis and possible septic shock)


Chief complaint: chest pain and weakness


History of present illness: 





this is a 51-year-old female with known history of MRSA infection involving her 

right axillary area.  Patient had incision and drainage of an abscess in the 

right axillary region, and she was treated with IV antibiotics, utilizing 

vancomycin and teflaro on outpatient basis via PICC line until the insurance 

company would not cover the antibiotic any further.  Patient was definitely 

showing improvement according to Dr. Abdul, however the patient was seen in 

the ER yesterday with sudden onset of chest pain associated with shortness of 

breath and weakness.  Patient was also noted to be hypotensive upon arrival to 

the ER.  No documented fever chills or rigors.  Patient was resuscitated with 

fluids upon arrival to the ER with at least 2-3 L given before transferring the 

patient to the ICU.  Upon arrival to the ICU, patient was having more 

hypotensive episodes, and we started norepinephrine for a short period of time.

  This was later discontinued, and upon my evaluation early this morning, the 

norepinephrine was discontinued, her hemodynamics are stable, and her IV fluid 

is still at 1 25 mL per hour.  Patient was given empiric antibiotics as per Dr. Abdul including daptomycin.and Fortaz.  Upon my evaluation, patient was noted 

to be in no form of respiratory distress, hemodynamically stable, sitting in bed

, relatively asymptomatic except for pain in her right shoulder area and 

axillary region.  Patient had a recent tibial fracture of the left lower 

extremity.  This is being addressed by orthopedics on the case.





Review of Systems





14 point review of systems were obtained, please refer to pertinent positives 

and negatives in HPI.





Past Medical History


Past Medical History: Eye Disorder, Fibromyalgia, Hypertension, Pneumonia, 

Thyroid Disorder


Additional Past Medical History / Comment(s): horners syndrom rt eye, 

HYPOGLYCEMIA, ANEMIA, MIGRAINES, pituitary tumor-no active chemo or radiation


History of Any Multi-Drug Resistant Organisms: MRSA


Date of last positivie culture/infection: 03/22/2017


MDRO Source:: right armpit


Past Surgical History: Bariatric Surgery, Cholecystectomy, Hernia Repair, 

Hysterectomy, Orthopedic Surgery


Additional Past Surgical History / Comment(s): tummy tuck; prev. trach. foot 

surgery ( right side took out extra toe ), left foot surgery, TRACHEOSTOMY/ AND 

REMOVED


Past Anesthesia/Blood Transfusion Reactions: No Reported Reaction


Past Psychological History: ADD/ADHD, Anxiety, Depression


Additional Psychological History / Comment(s): Stopped tobacco smoking just in 

January of this year.  Medically disabled


Smoking Status: Former smoker


Past Alcohol Use History: Rare


Additional Past Alcohol Use History / Comment(s): started smoking 1983, smoked 

1 PPD; pt states she quit 1/15/17


Past Drug Use History: None Reported





- Past Family History


  ** Mother


Family Medical History: Coronary Artery Disease (CAD)





  ** Father


Family Medical History: Cancer





Medications and Allergies


 Home Medications











 Medication  Instructions  Recorded  Confirmed  Type


 


Lisinopril-Hctz 20-25 mg 1 tab PO DAILY 08/12/15 03/22/17 History





[Zestoretic 20-25]    


 


Estrogens, Conjugated [Premarin] 0.3 mg PO DAILY 06/09/16 03/22/17 History


 


ARIPiprazole [Abilify] 20 mg PO DAILY 09/28/16 03/22/17 History


 


Venlafaxine HCl [Effexor XR] 225 mg PO DAILY 09/28/16 03/22/17 History


 


ARIPiprazole [Abilify] 5 mg PO DAILY 02/03/17 03/22/17 History


 


Baclofen 10 - 20 mg PO HS PRN 02/03/17 03/22/17 History


 


Dextroamphetamine/Amphetamine 30 mg PO TID 02/03/17 03/22/17 History





[Adderall]    


 


Fluticasone Nasal Spray [Flonase 2 spray EA NOSTRIL BID 02/03/17 03/22/17 

History





Nasal Spray]    


 


Levothyroxine Sodium [Synthroid] 50 mcg PO DAILY 02/03/17 03/22/17 History


 


Pantoprazole [Protonix] 40 mg PO DAILY 02/03/17 03/22/17 History


 


Furosemide [Lasix] 40 mg PO DAILY 03/14/17 03/22/17 History


 


Vancomycin 1,250 mg IV BID 03/14/17 03/22/17 History


 


Dextroamphetamine/Amphetamine 20 mg PO TID 03/22/17 03/22/17 History





[Adderall]    


 


HYDROcodone/APAP 10-325MG [Norco 1 tab PO Q4H PRN 03/22/17 03/22/17 History





]    


 


Multivitamins, Thera [Multivitamin 1 tab PO DAILY@1200 03/22/17 03/22/17 History





(formulary)]    











 Allergies











Allergy/AdvReac Type Severity Reaction Status Date / Time


 


No Known Allergies Allergy   Verified 03/22/17 15:25














Physical Exam


Vitals: 


 Vital Signs











  Temp Pulse Pulse Resp BP BP Pulse Ox


 


 03/23/17 11:00   92   21  119/56   99


 


 03/23/17 10:00   87    106/63   98


 


 03/23/17 09:00   89   24  115/71   99


 


 03/23/17 08:00  98.4 F  73   14  109/63   100


 


 03/23/17 07:00   73   17  109/63   98


 


 03/23/17 06:00   68   19  147/86   99


 


 03/23/17 05:00   61   21  138/89   99


 


 03/23/17 04:00   64   18  109/66   98


 


 03/23/17 03:00   73   19  124/61   98


 


 03/23/17 02:00   87   17  108/63   98


 


 03/23/17 01:00   77   17  116/70   97


 


 03/23/17 00:00  97.2 F L  47 L   23  138/71   100


 


 03/22/17 23:04   73   21  89/55   95


 


 03/22/17 23:00   65   23  89/55   99


 


 03/22/17 22:00   79   22  79/51   94 L


 


 03/22/17 21:00   82   24    95


 


 03/22/17 20:00   94   20  98/59   98


 


 03/22/17 19:22   102 H   20   


 


 03/22/17 18:44   85   14  106/57   99


 


 03/22/17 18:08   91   14  84/50   100


 


 03/22/17 17:48  97.2 F L   98  22   90/59  98


 


 03/22/17 17:35   80   15  83/52   100








 Intake and Output











 03/22/17 03/23/17 03/23/17





 22:59 06:59 14:59


 


Intake Total 1700 2183.786 1075


 


Output Total 1900 975 820


 


Balance -200 1208.786 255


 


Intake:   


 


  Intake, IV Titration 500 1103.786 875





  Amount   


 


    Norepinephrine 16 mg In  3.786 





    Sodium Chloride 0.9% 250   





    ml @ Titrate IV .Q0M [facility]   





    Rx#:[id number]   


 


    Potassium Phosphate 10   250





    mmol In Sodium Chloride 0   





    .9% 250 ml @ 125 mls/hr   





    IV ONCE ONE Rx#:[id number]   


 


    Sodium Chloride 0.9% 1, 500 1000 625





    000 ml @ 125 mls/hr IV .   





    Q8H [facility] Rx#:[id number]   


 


    cefTAZidime 2 gm In  100 





    Sodium Chloride 0.9% 100   





    ml @ 100 mls/hr IVPB Q12H   





    [facility] Rx#:[id number]   


 


  Oral 1200 1080 200


 


Output:   


 


  Urine 1900 975 820


 


Other:   


 


  Voiding Method Indwelling Catheter Indwelling Catheter Indwelling Catheter


 


  Weight 86.183 kg 93.3 kg 93.3 kg








 Patient Weight











 03/24/17





 06:59


 


Weight 93.3 kg














physical examination revealed a 51-year-old female in no distress, mostly 

complaining of pain in the axillary region/right side.





HEENT: No neck masses no JVD no thyromegaly.  No oral thrush noted.





Lungs: Diminished breath sounds at the bases no crackles or rhonchi or wheezes 

noted.





Cardiac: Normal S1 and S2, no S3 gallop, no murmur.





Abdomen: Obese soft nontender no megaly no rebound no guarding.





Extremities: No clubbing edema or cyanosis.  Right axillary region was evaluated

, however there is a sterile dressing applied by Dr. Abdul who recently 

evaluated the right axillary region, and he felt there was evidence of stool 

open areas communicating under the skin bridge.  Please refer to his physical 

exam.





Neurologic: No focal neurologic deficit.





Results





- Laboratory Findings


CBC and BMP: 


 03/23/17 04:06





 03/23/17 04:06


PT/INR, D-dimer











PT  10.5 sec (9.0-12.0)   03/23/17  04:06    


 


INR  1.0  (<1.1)   03/23/17  04:06    








Abnormal lab findings: 


 Abnormal Labs











  03/22/17 03/23/17 03/23/17





  19:22 04:06 04:06


 


RBC    3.19 L


 


Hgb    10.0 L


 


Hct    31.1 L


 


Potassium   3.2 L 


 


Chloride   115 H 


 


Carbon Dioxide   16 L 


 


BUN   18 H 


 


Glucose   167 H 


 


POC Glucose (mg/dL)  317 H  


 


Phosphorus   2.3 L 














  03/23/17





  08:00


 


RBC 


 


Hgb 


 


Hct 


 


Potassium 


 


Chloride 


 


Carbon Dioxide 


 


BUN 


 


Glucose 


 


POC Glucose (mg/dL)  110 H


 


Phosphorus 














- Diagnostic Findings


Additional studies: 





x-rays of shoulder, foot, ankle, were relatively unremarkable.  No evidence of 

fractures.chest x-ray showed no evidence of active disease.





Assessment and Plan


Plan: 





impression: Acute sepsis and septic shock related to right axillary abscess, 

status post recent incision and drainage of abscess.patient received fluid 

resuscitation and she required a brief period of pressors/norepinephrine to 

maintain adequate blood pressure.  Presently off norepinephrine.





2 history of pituitary tumor, may eventually need further MRI studies and 

comparison to previous studies.





3 subacute fibular fracture being addressed by orthopedics on the case.





4 acute renal failure secondary to sepsis and possible acute tubular 

necrosis.however renal functioning improved significantly with fluid 

resuscitation and improvement in her hemodynamic status.





Recommendation:continue present course of antibiotics as per infectious disease 

on the case, patient will be monitored for a few more hours in the ICU, and if 

she continues to improve and not requiring any more pressors, consider 

transferring the patient to a regular medical floor.  We'll continue to follow.


Time with Patient: Greater than 30

## 2017-03-23 NOTE — XR
Left ankle

 

HISTORY: Pain and swelling, fall

 

3 views of the left ankle correlated to left foot same date

 

There is soft tissue swelling. Bone mineralization is reduced which may limit sensitivity. Alignment 
is maintained. Small ossific density distal to the fibula is well-corticated and felt not likely to b
e acute. There is a small plantar calcaneal spur. Postop changes are noted to the second and fifth di
git.

 

IMPRESSION: Correlate for point tenderness laterally. No acute fracture is suspected.

## 2017-03-23 NOTE — XR
Left foot

 

HISTORY: Trauma and pain

 

3 views of the left foot

 

There is some distortion of the midportion of the proximal first phalanx, question, postop change, co
rrelate for tenderness.

 

Postop changes are noted. Alignment is maintained with the exception of some angulation of the distal
 fifth metatarsal likely posttraumatic. Bone mineralization is reduced. Small ossific density medial 
to the tarsometatarsal joint of the first digit as well as densities distal to the fibula are well-co
rticated and not felt likely to be acute. Prior osteotomy suspected of the first metatarsal. Degenera
tive change present at the tarsometatarsal joint especially the first digit, anterior tibiotalar join
t. There is a plantar calcaneal spur.

 

IMPRESSION: No acute dislocation is evident. Correlate for point tenderness.

## 2017-03-23 NOTE — P.GSCN
History of Present Illness


Consult date: 03/23/17


Reason for Consult: 





History of right axillary abscess


Requesting physician: Chris Carcamo


History of present illness: 





The patient is a 51-year-old female with history of MRSA of the right axilla 

from an abscess.  She is being followed by Dr. Aden in the wound care center.  

Her last visit at the wound care center was 1 week ago where she had 

debridement.  She then presented to the hospital with acute chest pain.  

Incidentally, she also presented with acute renal failure.  With her history of 

MRSA infection, infectious disease is consulted to her antibiotics that has 

been adjusted.  For wound care, she has Aquacel Ag packing.  She reports 

increasing discomfort along the right shoulder however none along the right 

axilla.  She reports doing well today compared to yesterday.  She is tolerating 

diet.  No reports of acute fevers or chills.  Gen. surgery is consulted for her 

history of chronic right axillary wound.





Review of Systems





CONSTITUTIONAL:  Had recent weight loss. No recent chills.


HEENT:  Denies any trouble with vision, hearing or nosebleeds.  No difficulty 

swallowing. 


LYMPHATIC:  The patient denies any lumps and bumps around the neck. 


ENDOCRINE:  Has thyroid disorders. Denies any blood sugar glucose intolerance.


RESPIRATORY:  Denies pneumonia. Had trouble with breathing.


CARDIOVASCULAR:  Had chest pain. No palpitations.


GASTROINTESTINAL:  Has heart burn. No bright red blood per rectum.  


GENITOURINARY:  Denies any increased urinary frequency.  


MUSCULOSKELETAL:  Has back pain, stiffness or joint arthritis. 


NEUROLOGIC: Has occasional numbness or tingling along the distal extremities. 


PSYCHIATRIC:  Has depression or suidical ideation. 


HEMATOLOGIC:  Denies any abnormal bleeding or bruising.       








Past Medical History


Past Medical History: Eye Disorder, Fibromyalgia, Hypertension, Pneumonia, 

Thyroid Disorder


Additional Past Medical History / Comment(s): horners syndrom rt eye, 

HYPOGLYCEMIA, ANEMIA, MIGRAINES, pituitary tumor-no active chemo or radiation


History of Any Multi-Drug Resistant Organisms: MRSA


Year Discovered:: 03/22/2017


MDRO Source:: right armpit


Past Surgical History: Bariatric Surgery, Cholecystectomy, Hernia Repair, 

Hysterectomy, Orthopedic Surgery


Additional Past Surgical History / Comment(s): tummy tuck; prev. trach. foot 

surgery ( right side took out extra toe ), left foot surgery, TRACHEOSTOMY/ AND 

REMOVED


Past Anesthesia/Blood Transfusion Reactions: No Reported Reaction


Past Psychological History: ADD/ADHD, Anxiety, Depression


Additional Psychological History / Comment(s): Stopped tobacco smoking just in 

January of this year.  Medically disabled


Smoking Status: Former smoker


Past Alcohol Use History: Rare


Additional Past Alcohol Use History / Comment(s): started smoking 1983, smoked 

1 PPD; pt states she quit 1/15/17


Past Drug Use History: None Reported





- Past Family History


  ** Mother


Family Medical History: Coronary Artery Disease (CAD)





  ** Father


Family Medical History: Cancer





Medications and Allergies


 Home Medications











 Medication  Instructions  Recorded  Confirmed  Type


 


Lisinopril-Hctz 20-25 mg 1 tab PO DAILY 08/12/15 03/22/17 History





[Zestoretic 20-25]    


 


Estrogens, Conjugated [Premarin] 0.3 mg PO DAILY 06/09/16 03/22/17 History


 


ARIPiprazole [Abilify] 20 mg PO DAILY 09/28/16 03/22/17 History


 


Venlafaxine HCl [Effexor XR] 225 mg PO DAILY 09/28/16 03/22/17 History


 


ARIPiprazole [Abilify] 5 mg PO DAILY 02/03/17 03/22/17 History


 


Baclofen 10 - 20 mg PO HS PRN 02/03/17 03/22/17 History


 


Dextroamphetamine/Amphetamine 30 mg PO TID 02/03/17 03/22/17 History





[Adderall]    


 


Fluticasone Nasal Spray [Flonase 2 spray EA NOSTRIL BID 02/03/17 03/22/17 

History





Nasal Spray]    


 


Levothyroxine Sodium [Synthroid] 50 mcg PO DAILY 02/03/17 03/22/17 History


 


Pantoprazole [Protonix] 40 mg PO DAILY 02/03/17 03/22/17 History


 


Furosemide [Lasix] 40 mg PO DAILY 03/14/17 03/22/17 History


 


Vancomycin 1,250 mg IV BID 03/14/17 03/22/17 History


 


Dextroamphetamine/Amphetamine 20 mg PO TID 03/22/17 03/22/17 History





[Adderall]    


 


HYDROcodone/APAP 10-325MG [Norco 1 tab PO Q4H PRN 03/22/17 03/22/17 History





]    


 


Multivitamins, Thera [Multivitamin 1 tab PO DAILY@1200 03/22/17 03/22/17 History





(formulary)]    











 Allergies











Allergy/AdvReac Type Severity Reaction Status Date / Time


 


No Known Allergies Allergy   Verified 03/22/17 15:25














Surgical - Exam


 Vital Signs











Temp Pulse Resp BP Pulse Ox


 


 98.1 F   93   15   66/42   97 


 


 03/22/17 14:37  03/22/17 14:37  03/22/17 14:37  03/22/17 14:37  03/22/17 14:37














GENERAL:  Well developed and in no acute distress. Pleasant.


HEENT:  No sclera icterus. Extraocular movements grossly intact.  Moist buccal 

mucosa. Head is atraumatic, normocephalic. Hears conversational speech. No 

nasal drainage.


NECK:  Supple without lymphadenopathy. No JV distention.


CHEST:  Non-labored respirations and equal bilateral excursions. 


CARDIOVASCULAR:  Regular rate and rhythm.  Palpable 2+ radial pulses.


ABDOMEN:  Soft, nontender.  Nondistended.


MUSCULOSKELETAL:  No clubbing, cyanosis or edema.  Dressing along the right 

axilla clean dry and intact.


NEUROLOGIC:  No focal or lateralizing signs. 


PSYCH:  Appropriate affect.  Alert and oriented to person, place and time.








Results





- Labs





 03/23/17 04:06





 03/23/17 11:56


 Abnormal Lab Results - Last 24 Hours (Table)











  03/23/17 03/23/17 03/23/17 Range/Units





  04:06 04:06 08:00 


 


RBC   3.19 L   (3.80-5.40)  m/uL


 


Hgb   10.0 L   (11.4-16.0)  gm/dL


 


Hct   31.1 L   (34.0-46.0)  %


 


Potassium  3.2 L    (3.5-5.1)  mmol/L


 


Chloride  115 H    ()  mmol/L


 


Carbon Dioxide  16 L    (22-30)  mmol/L


 


BUN  18 H    (7-17)  mg/dL


 


Glucose  167 H    (74-99)  mg/dL


 


POC Glucose (mg/dL)    110 H  (75-99)  mg/dL


 


Phosphorus  2.3 L    (2.5-4.5)  mg/dL














  03/23/17 Range/Units





  11:56 


 


RBC   (3.80-5.40)  m/uL


 


Hgb   (11.4-16.0)  gm/dL


 


Hct   (34.0-46.0)  %


 


Potassium  3.2 L  (3.5-5.1)  mmol/L


 


Chloride   ()  mmol/L


 


Carbon Dioxide   (22-30)  mmol/L


 


BUN   (7-17)  mg/dL


 


Glucose   (74-99)  mg/dL


 


POC Glucose (mg/dL)   (75-99)  mg/dL


 


Phosphorus   (2.5-4.5)  mg/dL








 Diabetes panel











  03/23/17 03/23/17 Range/Units





  04:06 11:56 


 


Sodium  140   (137-145)  mmol/L


 


Potassium  3.2 L  3.2 L  (3.5-5.1)  mmol/L


 


Chloride  115 H   ()  mmol/L


 


Carbon Dioxide  16 L   (22-30)  mmol/L


 


BUN  18 H   (7-17)  mg/dL


 


Creatinine  0.80   (0.52-1.04)  mg/dL


 


Glucose  167 H   (74-99)  mg/dL


 


Calcium  8.6   (8.4-10.2)  mg/dL








 Calcium panel











  03/23/17 Range/Units





  04:06 


 


Calcium  8.6  (8.4-10.2)  mg/dL


 


Phosphorus  2.3 L  (2.5-4.5)  mg/dL








 Pituitary panel











  03/23/17 03/23/17 Range/Units





  04:06 11:56 


 


Sodium  140   (137-145)  mmol/L


 


Potassium  3.2 L  3.2 L  (3.5-5.1)  mmol/L


 


Chloride  115 H   ()  mmol/L


 


Carbon Dioxide  16 L   (22-30)  mmol/L


 


BUN  18 H   (7-17)  mg/dL


 


Creatinine  0.80   (0.52-1.04)  mg/dL


 


Glucose  167 H   (74-99)  mg/dL


 


Calcium  8.6   (8.4-10.2)  mg/dL








 Adrenal panel











  03/23/17 03/23/17 Range/Units





  04:06 11:56 


 


Sodium  140   (137-145)  mmol/L


 


Potassium  3.2 L  3.2 L  (3.5-5.1)  mmol/L


 


Chloride  115 H   ()  mmol/L


 


Carbon Dioxide  16 L   (22-30)  mmol/L


 


BUN  18 H   (7-17)  mg/dL


 


Creatinine  0.80   (0.52-1.04)  mg/dL


 


Glucose  167 H   (74-99)  mg/dL


 


Calcium  8.6   (8.4-10.2)  mg/dL














Assessment and Plan


(1) Acute renal failure


Status: Acute   





(2) Hypokalemia


Status: Acute   





(3) Hypophosphatemia


Status: Acute   





(4) Right shoulder pain


Status: Chronic   





(5) Cellulitis due to MRSA


Status: Chronic   





(6) MRSA infection


Status: Chronic   





(7) Iron deficiency anemia


Status: Acute   


Plan: 





1.  Patient is being managed by infectious disease and agree with with local 

wound care.


2.  No acute surgical intervention needed at this time.


3.  Follow-up Dr. Aden as outpatient in 1 week.


4.  Recommend correction of electrolyte dyscrasias.

## 2017-03-23 NOTE — P.CNOR
History of Present Illness





- hospitals


Consult date: 03/23/17


Consult reason: fracture (Proximal fibular fracture left leg)


History of present illness: 





This is a 51-year-old female who is admitted to the intensive care unit at 

[facility] for sepsis.  Patient has history of MRSA infection 

from an abscess in her right axilla.  Patient reportedly fell at home prior to 

her admission when her knee buckled.  She complains of pain about the left 

ankle and left knee.  We're consulted for orthopedic evaluation.





Past Medical History


Past Medical History: Eye Disorder, Fibromyalgia, Hypertension, Pneumonia, 

Thyroid Disorder


Additional Past Medical History / Comment(s): horners syndrom rt eye, 

HYPOGLYCEMIA, ANEMIA, MIGRAINES, pituitary tumor-no active chemo or radiation


History of Any Multi-Drug Resistant Organisms: MRSA


Year Discovered:: 03/22/2017


MDRO Source:: right armpit


Past Surgical History: Bariatric Surgery, Cholecystectomy, Hernia Repair, 

Hysterectomy, Orthopedic Surgery


Additional Past Surgical History / Comment(s): tummy tuck; prev. trach. foot 

surgery ( right side took out extra toe ), left foot surgery, TRACHEOSTOMY/ AND 

REMOVED


Past Anesthesia/Blood Transfusion Reactions: No Reported Reaction


Past Psychological History: ADD/ADHD, Anxiety, Depression


Additional Psychological History / Comment(s): Stopped tobacco smoking just in 

January of this year.  Medically disabled


Smoking Status: Former smoker


Past Alcohol Use History: Rare


Additional Past Alcohol Use History / Comment(s): started smoking 1983, smoked 

1 PPD; pt states she quit 1/15/17


Past Drug Use History: None Reported





- Past Family History


  ** Mother


Family Medical History: Coronary Artery Disease (CAD)





  ** Father


Family Medical History: Cancer





Medications and Allergies


 Home Medications











 Medication  Instructions  Recorded  Confirmed  Type


 


Lisinopril-Hctz 20-25 mg 1 tab PO DAILY 08/12/15 03/22/17 History





[Zestoretic 20-25]    


 


Estrogens, Conjugated [Premarin] 0.3 mg PO DAILY 06/09/16 03/22/17 History


 


ARIPiprazole [Abilify] 20 mg PO DAILY 09/28/16 03/22/17 History


 


Venlafaxine HCl [Effexor XR] 225 mg PO DAILY 09/28/16 03/22/17 History


 


ARIPiprazole [Abilify] 5 mg PO DAILY 02/03/17 03/22/17 History


 


Baclofen 10 - 20 mg PO HS PRN 02/03/17 03/22/17 History


 


Dextroamphetamine/Amphetamine 30 mg PO TID 02/03/17 03/22/17 History





[Adderall]    


 


Fluticasone Nasal Spray [Flonase 2 spray EA NOSTRIL BID 02/03/17 03/22/17 

History





Nasal Spray]    


 


Levothyroxine Sodium [Synthroid] 50 mcg PO DAILY 02/03/17 03/22/17 History


 


Pantoprazole [Protonix] 40 mg PO DAILY 02/03/17 03/22/17 History


 


Furosemide [Lasix] 40 mg PO DAILY 03/14/17 03/22/17 History


 


Vancomycin 1,250 mg IV BID 03/14/17 03/22/17 History


 


Dextroamphetamine/Amphetamine 20 mg PO TID 03/22/17 03/22/17 History





[Adderall]    


 


HYDROcodone/APAP 10-325MG [Norco 1 tab PO Q4H PRN 03/22/17 03/22/17 History





]    


 


Multivitamins, Thera [Multivitamin 1 tab PO DAILY@1200 03/22/17 03/22/17 History





(formulary)]    











 Allergies











Allergy/AdvReac Type Severity Reaction Status Date / Time


 


No Known Allergies Allergy   Verified 03/22/17 15:25














Physical Examination





This is a pleasant 51-year-old female in no acute distress.  She is alert and 

oriented 3.  Exam of the head and neck is unremarkable.  She has full cervical 

spine motion without difficulty or pain.





Exam of the upper extremities reveals mild pain to the right shoulder with 

motion.  There is minimal pain with palpation.  She has 4 flexion to about 70 

and abduction to about 80 with pain.  She has full elbow, wrist, finger motion 

without difficulty or pain.  Neurovascular status the upper extremities is 

intact.





Exam of the lower extremities reveals swelling to the left ankle and lower leg.

  There is pain on palpation about the left proximal fibula.  There is no pain 

about the medial or lateral joint line of the knee.  She has pain with 

palpation about the distal fibula and fifth metatarsal.  She is able to 

dorsiflex and plantarflex with minimal pain.  There is no hip pain with 

logroll.  Neurovascular status to the lower extremities intact.





Results





X-rays of the left knee reveal a minimally displaced proximal fibular fracture.





- Labs


Labs: 


 Abnormal Lab Results - Last 24 Hours (Table)











  03/22/17 03/23/17 03/23/17 Range/Units





  19:22 04:06 04:06 


 


RBC    3.19 L  (3.80-5.40)  m/uL


 


Hgb    10.0 L  (11.4-16.0)  gm/dL


 


Hct    31.1 L  (34.0-46.0)  %


 


Potassium   3.2 L   (3.5-5.1)  mmol/L


 


Chloride   115 H   ()  mmol/L


 


Carbon Dioxide   16 L   (22-30)  mmol/L


 


BUN   18 H   (7-17)  mg/dL


 


Glucose   167 H   (74-99)  mg/dL


 


POC Glucose (mg/dL)  317 H    (75-99)  mg/dL


 


Phosphorus   2.3 L   (2.5-4.5)  mg/dL














  03/23/17 Range/Units





  08:00 


 


RBC   (3.80-5.40)  m/uL


 


Hgb   (11.4-16.0)  gm/dL


 


Hct   (34.0-46.0)  %


 


Potassium   (3.5-5.1)  mmol/L


 


Chloride   ()  mmol/L


 


Carbon Dioxide   (22-30)  mmol/L


 


BUN   (7-17)  mg/dL


 


Glucose   (74-99)  mg/dL


 


POC Glucose (mg/dL)  110 H  (75-99)  mg/dL


 


Phosphorus   (2.5-4.5)  mg/dL








 H & H











  03/23/17 Range/Units





  04:06 


 


Hgb  10.0 L  (11.4-16.0)  gm/dL


 


Hct  31.1 L  (34.0-46.0)  %








 Coagulation











  03/23/17 Range/Units





  04:06 


 


INR  1.0  (<1.1)  











Result Diagrams: 


 03/23/17 04:06





 03/23/17 04:06





Assessment and Plan


(1) Left fibular fracture


Status: Acute   





(2) Abscess of right axilla


Status: Acute   





(3) Cellulitis due to MRSA


Status: Acute   





(4) Left lateral ankle pain


Status: Acute   





(5) Left foot pain


Status: Acute   





(6) Right shoulder pain


Status: Acute   


Plan: 





The clinical and x-ray findings are discussed with the patient.  Natural 

history of this type of fracture discussed.  I recommended evaluation of the 

ankle and foot as well as the right shoulder with x-ray.  Once x-rays are 

available for review I'll make further recommendations as indicated.

## 2017-03-23 NOTE — CDI
In responding to this query, please exercise your independent professional 
judgment. The [facility] Coding Staff and Clinical Documentation Specialists 
appreciate your assistance in clarifying documentation, maintaining compliance 
with coding guidelines, accurately documenting patients condition and 
capturing severity of illness. The fact that a question is asked does not imply 
that any particular answer is desired or expected. Communication forms are a 
method of clarifying documentation and are not made part of the [facility] 
Record. Thank you in advance for your clarification.

 Last Revision, March 2016



Marley Nieves

[address] [place] [address]



Documentation Clarification Form



Date: 3/23/2017 1:41:00 PM

From: Shaniqueluis Wild

Phone: [phone]

MRN: [id number]

Admit Date: 3/22/2017 5:34:00 PM

Patient Name: Elyse Wu

Visit Number: [id number]

Discharge Date:



Dr. Qiana SALMERON MD   



Cellulitis due to MRSA is documented in the consult on 3/23/17.  



Patient history/risk factors: Hypertension, Right axilla abscess, Fibromyalgia, 
Former smoker

Clinical Indicators: Patient fell at home after knee buckled. She complains of 
pain to left ankle and left knee. 

Exam of lower extremities reveals swelling to the left ankle and lower leg

 X-Ray: Proximal fibular fracture left leg

Labs: WBC 7.5, HGB 10.0, HCT 31.1, 

Vital Signs: 66/42 93 16  98.1 97 % RA 



Treatment: 

Rocephin IV, Daptomycin IV, 

IV Fluids

Monitor Labs



In your professional opinion, can cellulitis due to MRSA be further specified 
as one of the following? 

              

   Laterality: Left or right? 

   Other, please specify:

   Unable to determine or ruled out



   Location:  Upper or lower limb? (specify site)





Please document in your progress notes in order to capture severity of illness 
and risk of mortality. Include clinical findings that support your diagnosis.



FYI: Press F11 to launch patient chart



___X__ Place X here if this finding has no clinical significance, is not 
applicable or if you are not able to provide any additional documentation.



In note, no current cellulitis per consult.

MTDD

## 2017-03-23 NOTE — P.HPIM
History of Present Illness


H&P Date: 03/23/17


Chief Complaint: Weakness


Patient is a 51-year-old  female with medical history significant for 

severe sepsis secondary to right axillary abscess status post incision and 

drainage on 02/05/2017 by Dr. Aden with fluid cultures positive for MRSA.  

Patient was treated with IV vancomycin and IV Teflora via a PICC line until 

insurance company could not cover the antibiotic and further.  Patient has been 

following with Dr. Abdul from infectious disease service and Dr. Aden from 

[facility] with signs of improvement.  

Patient presented to the emergency department with complaints of generalized 

weakness and apparently fell at home and hit her head and her neck.  X-ray with 

evidence of left fibular fracture.  Patient was noted to be hypotensive in the 

emergency department and was fluid resuscitated with 2 L of normal saline. 

Despite fluid resuscitation, patient remained hypotensive and was transferred 

to the intensive care unit where she was started on norepinephrine.  No 

evidence of fevers or leukocytosis.  Consults were requested for Dr. Abdul 

from infectious disease service, Dr. Denson for ICU management, Dr. Guerrero from 

Orthopedic service, and Dr. Aden from general surgery service.  This morning, 

the patient is evaluated at bedside in the intensive care unit.  Per nurse, 

patient required norepinephrine for approximately 2 hours and then it was 

weaned off.  Patient is complaining of left knee and left ankle pain.  Denies 

chills, fevers, nausea, vomiting, shortness of breath, chest pain, or abdominal 

pain.  Patient reports chronic lower back pain.  Patient reports good appetite.

  Patient's last bowel movement was 3 days ago.  Urine output adequate.  

Afebrile. 





Past Medical History


Past Medical History: Eye Disorder, Fibromyalgia, Hypertension, Pneumonia, 

Thyroid Disorder


Additional Past Medical History / Comment(s): horners syndrom rt eye, 

HYPOGLYCEMIA, ANEMIA, MIGRAINES, pituitary tumor-no active chemo or radiation


History of Any Multi-Drug Resistant Organisms: MRSA


Date of last positivie culture/infection: 03/22/2017


MDRO Source:: right armpit


Past Surgical History: Bariatric Surgery, Cholecystectomy, Hernia Repair, 

Hysterectomy, Orthopedic Surgery


Additional Past Surgical History / Comment(s): tummy tuck; prev. trach. foot 

surgery ( right side took out extra toe ), left foot surgery, TRACHEOSTOMY/ AND 

REMOVED


Past Anesthesia/Blood Transfusion Reactions: No Reported Reaction


Past Psychological History: ADD/ADHD, Anxiety, Depression


Additional Psychological History / Comment(s): Stopped tobacco smoking just in 

January of this year.  Medically disabled


Smoking Status: Former smoker


Past Alcohol Use History: Rare


Additional Past Alcohol Use History / Comment(s): started smoking 1983, smoked 

1 PPD; pt states she quit 1/15/17


Past Drug Use History: None Reported





- Past Family History


  ** Mother


Family Medical History: Coronary Artery Disease (CAD)





  ** Father


Family Medical History: Cancer





Medications and Allergies


 Home Medications











 Medication  Instructions  Recorded  Confirmed  Type


 


Lisinopril-Hctz 20-25 mg 1 tab PO DAILY 08/12/15 03/22/17 History





[Zestoretic 20-25]    


 


Estrogens, Conjugated [Premarin] 0.3 mg PO DAILY 06/09/16 03/22/17 History


 


ARIPiprazole [Abilify] 20 mg PO DAILY 09/28/16 03/22/17 History


 


Venlafaxine HCl [Effexor XR] 225 mg PO DAILY 09/28/16 03/22/17 History


 


ARIPiprazole [Abilify] 5 mg PO DAILY 02/03/17 03/22/17 History


 


Baclofen 10 - 20 mg PO HS PRN 02/03/17 03/22/17 History


 


Dextroamphetamine/Amphetamine 30 mg PO TID 02/03/17 03/22/17 History





[Adderall]    


 


Fluticasone Nasal Spray [Flonase 2 spray EA NOSTRIL BID 02/03/17 03/22/17 

History





Nasal Spray]    


 


Levothyroxine Sodium [Synthroid] 50 mcg PO DAILY 02/03/17 03/22/17 History


 


Pantoprazole [Protonix] 40 mg PO DAILY 02/03/17 03/22/17 History


 


Furosemide [Lasix] 40 mg PO DAILY 03/14/17 03/22/17 History


 


Vancomycin 1,250 mg IV BID 03/14/17 03/22/17 History


 


Dextroamphetamine/Amphetamine 20 mg PO TID 03/22/17 03/22/17 History





[Adderall]    


 


HYDROcodone/APAP 10-325MG [Norco 1 tab PO Q4H PRN 03/22/17 03/22/17 History





]    


 


Multivitamins, Thera [Multivitamin 1 tab PO DAILY@1200 03/22/17 03/22/17 History





(formulary)]    











 Allergies











Allergy/AdvReac Type Severity Reaction Status Date / Time


 


No Known Allergies Allergy   Verified 03/22/17 15:25














Physical Exam


Vitals: 


 Vital Signs











  Temp Pulse Pulse Resp BP BP Pulse Ox


 


 03/23/17 12:00  97.4 F L  93   22  103/65   98


 


 03/23/17 11:00   92   21  119/56   99


 


 03/23/17 10:00   87    106/63   98


 


 03/23/17 09:00   89   24  115/71   99


 


 03/23/17 08:00  98.4 F  73   14  109/63   100


 


 03/23/17 07:00   73   17  109/63   98


 


 03/23/17 06:00   68   19  147/86   99


 


 03/23/17 05:00   61   21  138/89   99


 


 03/23/17 04:00   64   18  109/66   98


 


 03/23/17 03:00   73   19  124/61   98


 


 03/23/17 02:00   87   17  108/63   98


 


 03/23/17 01:00   77   17  116/70   97


 


 03/23/17 00:00  97.2 F L  47 L   23  138/71   100


 


 03/22/17 23:04   73   21  89/55   95


 


 03/22/17 23:00   65   23  89/55   99


 


 03/22/17 22:00   79   22  79/51   94 L


 


 03/22/17 21:00   82   24    95


 


 03/22/17 20:00   94   20  98/59   98


 


 03/22/17 19:22   102 H   20   


 


 03/22/17 18:44   85   14  106/57   99


 


 03/22/17 18:08   91   14  84/50   100


 


 03/22/17 17:48  97.2 F L   98  22   90/59  98


 


 03/22/17 17:35   80   15  83/52   100








 Intake and Output











 03/22/17 03/23/17 03/23/17





 22:59 06:59 14:59


 


Intake Total 1700 2183.786 1200


 


Output Total 6274 330 5895


 


Balance -200 1208.786 80


 


Intake:   


 


  Intake, IV Titration 500 6258.210 1324





  Amount   


 


    Norepinephrine 16 mg In  3.786 





    Sodium Chloride 0.9% 250   





    ml @ Titrate IV .Q0M [facility]   





    Rx#:[id number]   


 


    Potassium Phosphate 10   250





    mmol In Sodium Chloride 0   





    .9% 250 ml @ 125 mls/hr   





    IV ONCE ONE Rx#:[id number]   


 


    Sodium Chloride 0.9% 1, 500 1000 750





    000 ml @ 125 mls/hr IV .   





    Q8H [facility] Rx#:[id number]   


 


    cefTAZidime 2 gm In  100 





    Sodium Chloride 0.9% 100   





    ml @ 100 mls/hr IVPB Q12H   





    [facility] Rx#:[id number]   


 


  Oral 1200 1080 200


 


Output:   


 


  Urine 4284 029 2892


 


Other:   


 


  Voiding Method Indwelling Catheter Indwelling Catheter Indwelling Catheter


 


  Weight 86.183 kg 93.3 kg 93.3 kg








 Patient Weight











 03/24/17





 06:59


 


Weight 93.3 kg











GENERAL: Pt awake and alert, well-appearing, well-nourished, and in no acute 

distress.


HEAD: Atraumatic, normocephalic.


EYES: Pupils equal, round, and reactive to light, extraocular movements intact, 

sclera anicteric, conjunctiva are normal.


ENT: Oropharynx clear without exudates.  Moist mucous membranes. Tongue smooth, 

pink, no lesions, protrudes in midline.


NECK:Normal range of motion, supple without lymphadenopathy or JVD. No carotid 

bruits. Thyroid midline, small and firm without palpable masses.


LUNGS: Breath sounds clear to auscultation bilaterally.  No wheezes, rales, or 

rhonchi.


HEART: Heart S1, S2, no S3 or S4.  Regular rate and rhythm.  No murmurs, rubs 

or gallops.


ABDOMEN: Soft, obese, nontender, nondistended, normoactive bowel sounds.  No 

guarding, no rebound.  No masses or organomegaly appreciated. 


EXTREMITIES: Palpable peripheral pulses.  Mild edema to left lower extremity.  

No calf tenderness.


NEUROLOGICAL: Pt oriented x 3. Cranial nerves II through XII grossly intact. 

Strength and sensation grossly intact.


PSYCH: Normal mood, normal affect.


SKIN: Warm, dry, intact. Normal turgor. No rashes or lesions.








Results


CBC & Chem 7: 


 03/23/17 04:06





 03/23/17 11:56


Labs: 


 Abnormal Lab Results - Last 24 Hours (Table)











  03/22/17 03/23/17 03/23/17 Range/Units





  19:22 04:06 04:06 


 


RBC    3.19 L  (3.80-5.40)  m/uL


 


Hgb    10.0 L  (11.4-16.0)  gm/dL


 


Hct    31.1 L  (34.0-46.0)  %


 


Potassium   3.2 L   (3.5-5.1)  mmol/L


 


Chloride   115 H   ()  mmol/L


 


Carbon Dioxide   16 L   (22-30)  mmol/L


 


BUN   18 H   (7-17)  mg/dL


 


Glucose   167 H   (74-99)  mg/dL


 


POC Glucose (mg/dL)  317 H    (75-99)  mg/dL


 


Phosphorus   2.3 L   (2.5-4.5)  mg/dL














  03/23/17 03/23/17 Range/Units





  08:00 11:56 


 


RBC    (3.80-5.40)  m/uL


 


Hgb    (11.4-16.0)  gm/dL


 


Hct    (34.0-46.0)  %


 


Potassium   3.2 L  (3.5-5.1)  mmol/L


 


Chloride    ()  mmol/L


 


Carbon Dioxide    (22-30)  mmol/L


 


BUN    (7-17)  mg/dL


 


Glucose    (74-99)  mg/dL


 


POC Glucose (mg/dL)  110 H   (75-99)  mg/dL


 


Phosphorus    (2.5-4.5)  mg/dL











Comments: 


X-ray left knee: Acute minimally displaced spiral fracture through the proximal 

fibular head.


X-ray left ankle: No acute fracture.


X-ray right shoulder: No acute fracture or dislocation.


Chest x-ray: report reviewed (Normal chest)





Thrombosis Risk Factor Assmnt





- DVT/VTE Prophylaxis


DVT/VTE Prophylaxis: Pharmacologic Prophylaxis ordered





- Choose All That Apply


Each Factor Represents 1 point: Age 41-60 years, Medical pt on bed rest, 

Obesity (BMI >25), Sepsis (< 1month), Serious lung disease incl. pneumonia (< 

1month)


Each Risk Factor Represents 2 Points: Patient confined to bed


Each Risk Factor Represents 5 Points: Hip, pelvis, or leg fracture (< 1 month)


Thrombosis Risk Factor Assessment Total Risk Factor Score: 12


Thrombosis Risk Factor Assessment Level: High Risk





Assessment and Plan


Plan: 


Impression and plan:





1.  Septic shock suspect secondary to right axillary abscess status post 

incision and drainage with history of MRSA.  Dr. Abdul from infectious disease 

consulted, recommendations noted.  Consult requested for Dr. Aden from surgical 

service, recommendations pending.  Continue IV antibiotics in the form of 

daptomycin and ceftazidime, IV hydration, local wound care, supportive 

treatment and pain management..


2.  Elevated AST and ALT, present on admission, suspect secondary to 

hypoperfusion secondary to sepsis.  Repeat CMP in a.m.  Continue IV hydration.


3.  Elevated troponin, present on admission, suspect secondary to sepsis.  EKG 

without ischemic changes.  Continue to monitor patient.  Continue cardiac 

monitoring.  Continue IV hydration. 


4.  Minimally displaced proximal fibular fracture.  Orthopedic service on 

consult, recommendations pending.


5.  Acute renal kidney injury, present on admission, suspect secondary to 

intravascular volume depletion secondary to sepsis.  Continue IV hydration.  


6.  Hypothyroidism.  Continue Synthroid.


7.  Fibromyalgia.  Continue Lyrica, Mobic, Norco 10.


8.  History of hypertension.  Hold home antihypertensives for now.


9.  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


10.  History of vaginal yeast infection.  Continue Diflucan.


11.  Anxiety and depression.  Continue Abilify, Effexor.


11.  History of nicotine dependence.


12.  GERD.  Continue Protonix.


13.  History of pituitary tumor.  May need further MRI studies and comparison 

to previous studies.





Continue to monitor patient.  From a medical standpoint, patient may be 

transferred to the intensive care unit once cleared by Dr. Denson.  We'll 

continue to follow consultants.





The above impression and plan have been discussed and directed by Dr. Carcamo. 

Lynette MCGOWAN acting as scribe for Dr. Carcamo.

## 2017-03-23 NOTE — P.PN
Subjective


Principal diagnosis: 





Chest pain and weakness


51-year-old woman known to the infectious disease service regarding her recent 

hospitalization due to MRSA infection to her right axillary area.  She been 

seen by surgery and had incision and drainage to the right axillary abscess.  

As noted is receiving treatment for MRSA therapy with vancomycin, was being 

treated with Teflora but insurance would not cover the antibiotic any further.  

Was showing improvement.  Was evaluated in the outpatient setting with further 

improvement.  Within had the sudden onset of chest pain associated with 

shortness of breath and weakness.  Visiting nurse evaluated.  She had evidence 

of hypotension and was sent to the emergency center directly.  Patient did not 

believe that she was having high-grade fevers or chills.  No rigors were noted.

  But did start to feel suddenly very poorly with the above symptoms.  The 

emergency center despite fluids she remains somewhat hypotensive.  Chest was 

admitted to intensive care unit for potential vasopressor therapy and ongoing 

resuscitation with concerns to sepsis.


The patient did have chest pain but troponins were normal.  It is noted that 

she developed acute renal failure with a creatinine of 1.60.





Patient is now improved.  She's been transferred out of the intensive care 

unit.  She's feeling better.  Still has difficulties with pain.  She multiple 

falls and is having complaints of pain to her right shoulder as well as to her 

left leg where she has a known fracture.  She was seen by orthopedics and 

further x-rays of been requested.  Await their final input.


Patient still feels somewhat poorly.  She's had generalized weakness and 

multiple falls.  There was concerns it was related to sepsis.  However at this 

time we have no new cultures.  Concern that the underlying problem could be 

related to her prior history of pituitary tumor.











Objective





- Vital Signs


Vital signs: 


 Vital Signs











Temp  98.7 F   03/23/17 22:01


 


Pulse  77   03/23/17 22:01


 


Resp  17   03/23/17 22:01


 


BP  105/61   03/23/17 22:01


 


Pulse Ox  98   03/23/17 16:00








 Intake & Output











 03/23/17 03/23/17 03/24/17





 06:59 18:59 06:59


 


Intake Total 3883.786 1425 


 


Output Total 2875 1320 


 


Balance 1008.786 105 


 


Weight 93.3 kg 93.3 kg 


 


Intake:   


 


  Intake, IV Titration 8657.458 8506 





  Amount   


 


    Norepinephrine 16 mg In 3.786  





    Sodium Chloride 0.9% 250   





    ml @ Titrate IV .Q0M [facility]   





    Rx#:[id number]   


 


    Potassium Phosphate 10  250 





    mmol In Sodium Chloride 0   





    .9% 250 ml @ 125 mls/hr   





    IV ONCE ONE Rx#:[id number]   


 


    Sodium Chloride 0.9% 1, 1500 875 





    000 ml @ 125 mls/hr IV .   





    Q8H [facility] Rx#:[id number]   


 


    cefTAZidime 2 gm In 100 100 





    Sodium Chloride 0.9% 100   





    ml @ 100 mls/hr IVPB Q12H   





    [facility] Rx#:[id number]   


 


  Oral 2280 200 


 


Output:   


 


  Urine 2875 1320 


 


Other:   


 


  Voiding Method Indwelling Catheter Indwelling Catheter 


 


  # Voids   1














- Exam





51-year-old woman who is currently complaining of pain at her axillary area.  

The chest pain and shortness of breath have improved HEENT: Anicteric 

conjunctiva are pink and moist nasal mucosa grossly intact without significant 

lesions, there is no thrush.


Neck: The neck is supple without significant lymphadenopathy or thyromegaly.


Lungs: There is symmetrical air entry.  There are expiratory wheezes throughout 

the lung fields.  There is no bronchial sounds, no changes of egophony or 

dullness.


Heart: Regular rate and rhythm with an audible S1-S2, no S3 soft S4  There is 

no significant murmur click or rub, PMI was nondisplaced.


Abdomen: Obese, Positive bowel sounds soft and nontender without palpable 

masses or organomegaly.  There was no guarding or rebound.


Extremities: The upper extremities have excellent pulses they are symmetric, no 

significant petechiae or telangiectasia.  No splinter hemorrhages were noted.  

Lower extremities has minimal trace edema


The right axillary area shows evidence the recent surgical intervention.  

Please see the nursing photography for the exact location of the surgical 

wounds.  She is evidence of the 2 open areas.  The 2 most cephalad are 

communicating underneath the skin bridge.  Measuring at 6 x 2.5 x 3 but has a 8 

cm tunnel 





Neuro: Awake alert oriented to person place and time.  There are no acute new 

gross focal sensory motor deficits








- Labs


CBC & Chem 7: 


 03/23/17 04:06





 03/23/17 11:56


Labs: 


 Abnormal Lab Results - Last 24 Hours (Table)











  03/23/17 03/23/17 03/23/17 Range/Units





  04:06 04:06 08:00 


 


RBC   3.19 L   (3.80-5.40)  m/uL


 


Hgb   10.0 L   (11.4-16.0)  gm/dL


 


Hct   31.1 L   (34.0-46.0)  %


 


Potassium  3.2 L    (3.5-5.1)  mmol/L


 


Chloride  115 H    ()  mmol/L


 


Carbon Dioxide  16 L    (22-30)  mmol/L


 


BUN  18 H    (7-17)  mg/dL


 


Glucose  167 H    (74-99)  mg/dL


 


POC Glucose (mg/dL)    110 H  (75-99)  mg/dL


 


Phosphorus  2.3 L    (2.5-4.5)  mg/dL














  03/23/17 03/23/17 Range/Units





  11:56 21:00 


 


RBC    (3.80-5.40)  m/uL


 


Hgb    (11.4-16.0)  gm/dL


 


Hct    (34.0-46.0)  %


 


Potassium  3.2 L   (3.5-5.1)  mmol/L


 


Chloride    ()  mmol/L


 


Carbon Dioxide    (22-30)  mmol/L


 


BUN    (7-17)  mg/dL


 


Glucose    (74-99)  mg/dL


 


POC Glucose (mg/dL)   133 H  (75-99)  mg/dL


 


Phosphorus    (2.5-4.5)  mg/dL








 Laboratory Results











WBC  7.5 k/uL (3.8-10.6)   03/23/17  04:06    


 


RBC  3.19 m/uL (3.80-5.40)  L  03/23/17  04:06    


 


Hgb  10.0 gm/dL (11.4-16.0)  L  03/23/17  04:06    


 


Hct  31.1 % (34.0-46.0)  L  03/23/17  04:06    


 


MCV  97.4 fL (80.0-100.0)   03/23/17  04:06    


 


MCH  31.3 pg (25.0-35.0)   03/23/17  04:06    


 


MCHC  32.1 g/dL (31.0-37.0)   03/23/17  04:06    


 


RDW  14.6 % (11.5-15.5)   03/23/17  04:06    


 


Plt Count  186 k/uL (150-450)   03/23/17  04:06    


 


Neutrophils % (Manual)  42.0 %  03/23/17  04:06    


 


Band Neutrophils %  40.0 %  03/23/17  04:06    


 


Lymphocytes % (Manual)  14.0 %  03/23/17  04:06    


 


Monocytes % (Manual)  3.0 %  03/23/17  04:06    


 


Eosinophils % (Manual)  1.0 %  03/23/17  04:06    


 


Neutrophils # (Manual)  6.2 k/uL (1.3-7.7)   03/23/17  04:06    


 


Lymphocytes # (Manual)  1.1 k/uL (1.0-4.8)   03/23/17  04:06    


 


Monocytes # (Manual)  0.2 k/uL (0-1.0)   03/23/17  04:06    


 


Eosinophils # (Manual)  0.1 k/uL (0-0.7)   03/23/17  04:06    


 


Nucleated RBCs  0 /100 WBC (0-0)   03/23/17  04:06    


 


Manual Slide Review  Performed   03/23/17  04:06    


 


Toxic Vacuolation  Present   03/22/17  15:09    


 


Polychromasia  Present   03/22/17  15:09    


 


PT  10.5 sec (9.0-12.0)   03/23/17  04:06    


 


INR  1.0  (<1.1)   03/23/17  04:06    


 


APTT  27.3 sec (22.0-30.0)   03/23/17  04:06    


 


Sodium  140 mmol/L (137-145)   03/23/17  04:06    


 


Potassium  3.2 mmol/L (3.5-5.1)  L  03/23/17  11:56    


 


Chloride  115 mmol/L ()  H  03/23/17  04:06    


 


Carbon Dioxide  16 mmol/L (22-30)  L  03/23/17  04:06    


 


Anion Gap  9 mmol/L  03/23/17  04:06    


 


BUN  18 mg/dL (7-17)  H  03/23/17  04:06    


 


Creatinine  0.80 mg/dL (0.52-1.04)   03/23/17  04:06    


 


Est GFR (MDRD) Af Amer  >60  (>60 ml/min/1.73 sqM)   03/23/17  04:06    


 


Est GFR (MDRD) Non-Af  >60  (>60 ml/min/1.73 sqM)   03/23/17  04:06    


 


Glucose  167 mg/dL (74-99)  H  03/23/17  04:06    


 


POC Glucose (mg/dL)  133 mg/dL (75-99)  H  03/23/17  21:00    


 


POC Glu Operater Brianna Reilly   03/23/17  21:00    


 


Plasma Lactic Acid Robert  1.8 mmol/L (0.7-2.0)   03/22/17  19:01    


 


Calcium  8.6 mg/dL (8.4-10.2)   03/23/17  04:06    


 


Phosphorus  2.3 mg/dL (2.5-4.5)  L  03/23/17  04:06    


 


Magnesium  2.3 mg/dL (1.6-2.3)   03/23/17  04:06    


 


Total Bilirubin  0.4 mg/dL (0.2-1.3)   03/22/17  15:09    


 


AST  123 U/L (14-36)  H  03/22/17  15:09    


 


ALT  89 U/L (9-52)  H  03/22/17  15:09    


 


Alkaline Phosphatase  109 U/L ()   03/22/17  15:09    


 


Total Creatine Kinase  343 U/L ()  H  03/22/17  15:09    


 


CK-MB (CK-2)  4.9 ng/mL (0.0-2.4)  H*  03/22/17  15:09    


 


CK-MB (CK-2) Rel Index  1.4   03/22/17  15:09    


 


Troponin I  0.048 ng/mL (0.000-0.034)  H*  03/22/17  15:09    


 


Total Protein  6.0 g/dL (6.3-8.2)  L  03/22/17  15:09    


 


Albumin  3.2 g/dL (3.5-5.0)  L  03/22/17  15:09    


 


Cortisol  27 ug/dL  03/22/17  15:09    


 


Urine Color  Yellow   03/22/17  15:41    


 


Urine Appearance  Cloudy  (Clear)  H  03/22/17  15:41    


 


Urine pH  5.0  (5.0-8.0)   03/22/17  15:41    


 


Ur Specific Gravity  1.006  (1.001-1.035)   03/22/17  15:41    


 


Urine Protein  Trace  (Negative)  H  03/22/17  15:41    


 


Urine Glucose (UA)  Negative  (Negative)   03/22/17  15:41    


 


Urine Ketones  Negative  (Negative)   03/22/17  15:41    


 


Urine Blood  Negative  (Negative)   03/22/17  15:41    


 


Urine Nitrite  Negative  (Negative)   03/22/17  15:41    


 


Urine Bilirubin  Negative  (Negative)   03/22/17  15:41    


 


Urine Urobilinogen  <2.0 mg/dL (<2.0)   03/22/17  15:41    


 


Ur Leukocyte Esterase  Negative  (Negative)   03/22/17  15:41    


 


Urine RBC  1 /hpf (0-5)   03/22/17  15:41    


 


Urine WBC  <1 /hpf (0-5)   03/22/17  15:41    


 


Ur Squamous Epith Cells  2 /hpf (0-4)   03/22/17  15:41    


 


Amorphous Sediment  Rare /hpf (None)  H  03/22/17  15:41    


 


Urine Bacteria  Few /hpf (None)  H  03/22/17  15:41    


 


Hyaline Casts  2 /lpf (0-2)   03/22/17  15:41    


 


Urine Mucus  Rare /hpf (None)  H  03/22/17  15:41    


 


Random Vancomycin  7.2 ug/mL  03/22/17  15:09    


 


Influenza Type A RNA  Not Detected  (Not Detectd)   03/23/17  01:00    


 


Influenza Type B (PCR)  Not Detected  (Not Detectd)   03/23/17  01:00    








 Microbiology





03/22/17 15:41   Urine,Catheterized   Urine Culture - Final


03/22/17 15:09   Blood   Blood Culture - Preliminary


                            No Growth after 24 hours











Assessment and Plan


(1) Abscess of right axilla


Narrative/Plan: 


51-year-old  female presents to the emergency center from home after 

evaluation by her home care nurse.  Patient was complaining of some chest pain 

some increasing shortness of breath and significant weakness.  She was 

suffering falls at home.  In the emergency center as noted there was evidence 

of a fibular fracture.


The patient admission to the emergency center without evidence of hypotension.  

This was persistent despite resuscitation.  She subsequently has been admitted 

to the intensive care unit for ongoing resuscitation and vasopressor therapy if 

needed.  Patient is feeling  better this evening.  He still has some concerns 

about her pain but was resting better today.


Local wound care to the arm will be done with the Aquacel silver rope.  This 

can be changed every other day.  Wound care center is trying to obtain the 

gauze base negative pressure therapy system


At admission the lactic acid was elevated is now improved with hydration





At this time concern will be to a gram-negative superinfection while she has 

been on and by therapy with vancomycin.  Consequently Fortaz is added while 

cultures are pending.  Multiple cultures are been requested


Patient does have difficulty with recurrent vaginal yeast infection due to the 

added back therapy and Diflucan is added





Patient is a history of pituitary tumor and with her current symptoms of 

weakness and falls would do well to have an imaging of her pituitary with an MRI

, as soon as possible would be reasonable given her ongoing symptoms.  

Communicating this to primary care. 


The acute renal failure has resolved she's back to her baseline creatinine.





With her multiple falls is been seen by orthopedics.  X-rays have been 

requested especially to the shoulder she's having some ongoing pain.  Await 

their input as to the overall treatment to the left proximal fibular fracture.


Status: Acute   





(2) MRSA infection


Status: Chronic   





(3) Gram-negative infection


Status: Acute

## 2017-03-24 VITALS — DIASTOLIC BLOOD PRESSURE: 83 MMHG | SYSTOLIC BLOOD PRESSURE: 160 MMHG | TEMPERATURE: 102.1 F

## 2017-03-24 VITALS — RESPIRATION RATE: 30 BRPM

## 2017-03-24 VITALS — HEART RATE: 123 BPM

## 2017-03-24 LAB
ALP SERPL-CCNC: 95 U/L (ref 38–126)
ALT SERPL-CCNC: 88 U/L (ref 9–52)
ANION GAP SERPL CALC-SCNC: 11 MMOL/L
APTT BLD: 24.2 SEC (ref 22–30)
AST SERPL-CCNC: 89 U/L (ref 14–36)
BUN SERPL-SCNC: 14 MG/DL (ref 7–17)
CALCIUM SPEC-MCNC: 8.2 MG/DL (ref 8.4–10.2)
CELLS COUNTED: 200
CH: 31.8
CHCM: 32.8
CHLORIDE SERPL-SCNC: 116 MMOL/L (ref 98–107)
CO2 SERPL-SCNC: 13 MMOL/L (ref 22–30)
ERYTHROCYTE [DISTWIDTH] IN BLOOD BY AUTOMATED COUNT: 3.08 M/UL (ref 3.8–5.4)
ERYTHROCYTE [DISTWIDTH] IN BLOOD: 14.9 % (ref 11.5–15.5)
GLUCOSE BLD-MCNC: 95 MG/DL (ref 75–99)
GLUCOSE BLD-MCNC: 97 MG/DL (ref 75–99)
GLUCOSE SERPL-MCNC: 94 MG/DL (ref 74–99)
HCT VFR BLD AUTO: 30.1 % (ref 34–46)
HDW: 3.37
HGB BLD-MCNC: 9.7 GM/DL (ref 11.4–16)
INR PPP: 1 (ref ?–1.1)
MAGNESIUM SPEC-SCNC: 2 MG/DL (ref 1.6–2.3)
MCH RBC QN AUTO: 31.7 PG (ref 25–35)
MCHC RBC AUTO-ENTMCNC: 32.4 G/DL (ref 31–37)
MCV RBC AUTO: 97.8 FL (ref 80–100)
NON-AFRICAN AMERICAN GFR(MDRD): >60
PHOSPHATE SERPL-MCNC: 2.6 MG/DL (ref 2.5–4.5)
POTASSIUM SERPL-SCNC: 3.4 MMOL/L (ref 3.5–5.1)
PROT SERPL-MCNC: 5.2 G/DL (ref 6.3–8.2)
PT BLD: 9.8 SEC (ref 9–12)
SODIUM SERPL-SCNC: 140 MMOL/L (ref 137–145)
WBC # BLD AUTO: 3.8 K/UL (ref 3.8–10.6)
WBC (PEROX): 3.82

## 2017-03-24 RX ADMIN — ACETAMINOPHEN PRN MG: 325 TABLET, FILM COATED ORAL at 07:44

## 2017-03-24 RX ADMIN — ACETAMINOPHEN PRN MG: 325 TABLET, FILM COATED ORAL at 16:47

## 2017-03-24 RX ADMIN — ACETAMINOPHEN PRN MG: 325 TABLET, FILM COATED ORAL at 12:26

## 2017-03-24 RX ADMIN — HEPARIN SODIUM SCH UNIT: 5000 INJECTION, SOLUTION INTRAVENOUS; SUBCUTANEOUS at 07:46

## 2017-03-24 RX ADMIN — HEPARIN SODIUM SCH UNIT: 5000 INJECTION, SOLUTION INTRAVENOUS; SUBCUTANEOUS at 16:41

## 2017-03-24 RX ADMIN — FLUCONAZOLE SCH MG: 100 TABLET ORAL at 07:47

## 2017-03-24 RX ADMIN — PREGABALIN SCH MG: 75 CAPSULE ORAL at 07:20

## 2017-03-24 RX ADMIN — HYDROCODONE BITARTRATE AND ACETAMINOPHEN PRN EACH: 10; 325 TABLET ORAL at 00:44

## 2017-03-24 RX ADMIN — HEPARIN SODIUM SCH UNIT: 5000 INJECTION, SOLUTION INTRAVENOUS; SUBCUTANEOUS at 00:01

## 2017-03-24 RX ADMIN — HYDROCODONE BITARTRATE AND ACETAMINOPHEN PRN EACH: 10; 325 TABLET ORAL at 04:33

## 2017-03-24 RX ADMIN — CEFAZOLIN SCH MLS/HR: 330 INJECTION, POWDER, FOR SOLUTION INTRAMUSCULAR; INTRAVENOUS at 04:35

## 2017-03-24 RX ADMIN — HYDROCODONE BITARTRATE AND ACETAMINOPHEN PRN EACH: 10; 325 TABLET ORAL at 11:09

## 2017-03-24 RX ADMIN — VENLAFAXINE HYDROCHLORIDE SCH MG: 75 CAPSULE, EXTENDED RELEASE ORAL at 07:50

## 2017-03-24 RX ADMIN — PREGABALIN SCH: 75 CAPSULE ORAL at 14:36

## 2017-03-24 RX ADMIN — LEVOTHYROXINE SODIUM SCH MCG: 50 TABLET ORAL at 07:20

## 2017-03-24 RX ADMIN — MELOXICAM SCH MG: 7.5 TABLET ORAL at 07:49

## 2017-03-24 RX ADMIN — HYDROCODONE BITARTRATE AND ACETAMINOPHEN PRN EACH: 10; 325 TABLET ORAL at 17:27

## 2017-03-24 RX ADMIN — FLUTICASONE PROPIONATE SCH SPRAY: 50 SPRAY, METERED NASAL at 07:47

## 2017-03-24 RX ADMIN — CEFAZOLIN SCH MLS/HR: 330 INJECTION, POWDER, FOR SOLUTION INTRAMUSCULAR; INTRAVENOUS at 13:54

## 2017-03-24 RX ADMIN — ESTROGENS, CONJUGATED SCH MG: 0.3 TABLET, FILM COATED ORAL at 07:46

## 2017-03-24 RX ADMIN — THERA TABS SCH EACH: TAB at 13:54

## 2017-03-24 NOTE — XR
EXAMINATION TYPE: XR chest 1V

 

DATE OF EXAM: 3/24/2017 1:51 PM

 

HISTORY: wheezing.

 

REFERENCE: Previous study dated 3/22/2017.

 

FINDINGS: The heart appears enlarged. The lungs appear clear. Pleural space are clear. There is vascu
lar congestion present.

 

IMPRESSION: 

1. CARDIOMEGALY.

2. VASCULAR CONGESTION.

## 2017-03-24 NOTE — P.DS
Providers


Date of admission: 


03/22/17 17:34





Expected date of discharge: 03/24/17


Attending physician: 


Chris Carcamo





Consults: 





 





03/23/17 11:00


Consult Physician Routine 


   Consulting Provider: Kasey Aden


   Consult Reason/Comments: Right axilla wound


   Do you want consulting provider notified?: Yes











Primary care physician: 


Chris Carcamo





[place] Course: 


Patient is a 51-year-old  female with medical history significant for 

severe sepsis secondary to right axillary abscess status post incision and 

drainage on 02/05/2017 by Dr. Aden with fluid cultures positive for MRSA.  

Patient also has a prior history of pituitary tumor.  Patient was treated with 

IV vancomycin and IV Teflora via a PICC line until insurance company could not 

cover the antibiotic and further.  Patient has been following with Dr. Abdul 

from infectious disease service and Dr. [facility] with signs of improvement.  Patient presented to the emergency 

department with complaints of generalized weakness and apparently fell at home. 

X-ray with evidence of left fibular fracture.  Patient was noted to be 

hypotensive in the emergency department and was fluid resuscitated with 2 L of 

normal saline. Despite fluid resuscitation, patient remained hypotensive and 

was transferred to the intensive care unit where she was started on 

norepinephrine.  Patient was given empiric antibiotics including daptomycin and 

Fortaz and Diflucan. Patient improved and was transferred out of the intensive 

care unit to the medical floor.  This morning, patient had evidence of fevers, 

shortness of breath with some wheezing, tachycardia, and elevated lactic acid.  

Chest x-ray showed cardiomegaly and mild vascular congestion with no clear-cut 

infiltrate or evidence of robbi pulmonary edema.  Patient was transferred back 

to the intensive care unit.  Final urine culture and preliminary blood cultures 

with no growth.  Plans have been initiated for patient to be transferred to a 

tertiary care center for higher level of care.  Patient was evaluated by Dr. Abdul from infectious disease service, Dr. Denson for ICU management, Dr. Guerrero from Orthopedic service, and Dr. Aden from general surgery service during 

her hospital stay.  As far as left lower extremity fracture, patient was 

recommended an equalizer boot and may bear weight as tolerated in the boot; 

walker recommended for balance per orthopedic service.





Discharge diagnoses:





1.  Septic shock secondary to right axillary abscess, status post recent 

incision and drainage of abscess with history of MRSA.


2.  History of pituitary tumor, may eventually need further MRI studies in 

comparison to previous studies.


3.  Minimally displaced proximal fibular fracture.


4.  Acute renal failure, present on admission, suspect secondary to 

intravascular volume depletion secondary to sepsis.


5.  Hypothyroidism.  


6.  Fibromyalgia.  


7.  History of hypertension. 


8.  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


9.  History of vaginal yeast infection.  


10.  Anxiety and depression.  


11.  History of nicotine dependence.


12.  GERD.  Continue Protonix.


13.  Possible avulsion fracture of lateral malleolus of left fibula.





The above impression and plan have been discussed and directed by Dr. Carcamo. 

Lynette MCGOWAN acting as scribe for Dr. Carcamo.


Pertinent Studies: 


EKG; had cervical spine CT; right knee x-ray; chest x-ray; right ankle x-ray; 

right foot x-ray; right shoulder x-ray;








Plan - Discharge Summary


Discharge Medication List





Lisinopril-Hctz 20-25 mg [Zestoretic 20-25] 1 tab PO DAILY 08/12/15 [History]


Pregabalin [Lyrica] 150 mg PO Q8H #90 capsule 01/27/16 [Rx]


Estrogens, Conjugated [Premarin] 0.3 mg PO DAILY 06/09/16 [History]


ARIPiprazole [Abilify] 20 mg PO DAILY 09/28/16 [History]


Venlafaxine HCl [Effexor XR] 225 mg PO DAILY 09/28/16 [History]


ARIPiprazole [Abilify] 5 mg PO DAILY 02/03/17 [History]


Baclofen 10 - 20 mg PO HS PRN 02/03/17 [History]


Dextroamphetamine/Amphetamine [Adderall] 30 mg PO TID 02/03/17 [History]


Fluticasone Nasal Spray [Flonase Nasal Spray] 2 spray EA NOSTRIL BID 02/03/17 [

History]


Levothyroxine Sodium [Synthroid] 50 mcg PO DAILY 02/03/17 [History]


Pantoprazole [Protonix] 40 mg PO DAILY 02/03/17 [History]


Meloxicam [Mobic] 15 mg PO DAILY #30 tab 03/04/17 [Rx]


Furosemide [Lasix] 40 mg PO DAILY 03/14/17 [History]


Vancomycin 1,250 mg IV BID 03/14/17 [History]


Fluconazole 200 mg PO DAILY #7 tab 03/20/17 [Rx]


Dextroamphetamine/Amphetamine [Adderall] 20 mg PO TID 03/22/17 [History]


HYDROcodone/APAP 10-325MG [Norco ] 1 tab PO Q4H PRN 03/22/17 [History]


Multivitamins, Thera [Multivitamin (formulary)] 1 tab PO DAILY@1200 03/22/17 [

History]








Follow up Appointment(s)/Referral(s): 


Chris Carcamo DO [Primary Care Provider] - 1-2 days


[facility], [NON-STAFF] - 


[facility], [REFERRING] - 


Ion Guerrero MD [STAFF PHYSICIAN] - 2 Weeks


Shankar Abdul MD [STAFF PHYSICIAN] - 1 Week


Activity/Diet/Wound Care/Special Instructions: 


Maintain equalizer boot left lower extremity.


May bear weight as tolerated in boot with walker.


Discharge Disposition: DC/TRNS INTERMEDIATE CARE FAC

## 2017-03-24 NOTE — P.PN
Subjective


Principal diagnosis: 





Chest pain and weakness


51-year-old woman known to the infectious disease service regarding her recent 

hospitalization due to MRSA infection to her right axillary area.  She been 

seen by surgery and had incision and drainage to the right axillary abscess.  

As noted is receiving treatment for MRSA therapy with vancomycin, was being 

treated with Teflora but insurance would not cover the antibiotic any further.  

Was showing improvement.  Was evaluated in the outpatient setting with further 

improvement.  Within had the sudden onset of chest pain associated with 

shortness of breath and weakness.  Visiting nurse evaluated.  She had evidence 

of hypotension and was sent to the emergency center directly.  Patient did not 

believe that she was having high-grade fevers or chills.  No rigors were noted.

  But did start to feel suddenly very poorly with the above symptoms.  The 

emergency center despite fluids she remains somewhat hypotensive.  Chest was 

admitted to intensive care unit for potential vasopressor therapy and ongoing 

resuscitation with concerns to sepsis.


The patient did have chest pain but troponins were normal.  It is noted that 

she developed acute renal failure with a creatinine of 1.60.





Patient is now improved.  She's been transferred out of the intensive care 

unit.  She's feeling better.  Still has difficulties with pain.  She multiple 

falls and is having complaints of pain to her right shoulder as well as to her 

left leg where she has a known fracture.  She was seen by orthopedics and 

further x-rays of been requested.  Await their final input.


Patient still feels somewhat poorly.  She's had generalized weakness and 

multiple falls.  


There was concerns it was related to sepsis.  However at this time we have no 

new positive cultures.  Concern that the underlying problem could be related to 

her prior history of pituitary tumor.


The patient was transferred to medical floor now back in ICU due to hypotension 

and tachycardia. Is also complaining of shortness of breath.  Is very anxious.








Objective





- Vital Signs


Vital signs: 


 Vital Signs











Temp  102.1 F H  03/24/17 16:00


 


Pulse  123 H  03/24/17 16:00


 


Resp  30 H  03/24/17 16:00


 


BP  160/83   03/24/17 16:00


 


Pulse Ox  100   03/24/17 16:00








 Intake & Output











 03/23/17 03/24/17 03/24/17





 18:59 06:59 18:59


 


Intake Total 1425  1365


 


Output Total 1320  


 


Balance 105  1365


 


Weight 93.3 kg  98.6 kg


 


Intake:   


 


  IV   1125


 


    Sodium Chloride 0.9% 1,   1125





    000 ml @ 125 mls/hr IV .   





    Q8H [facility] Rx#:[id number]   


 


  Intake, IV Titration 1225  





  Amount   


 


    Potassium Phosphate 10 250  





    mmol In Sodium Chloride 0   





    .9% 250 ml @ 125 mls/hr   





    IV ONCE ONE Rx#:[id number]   


 


    Sodium Chloride 0.9% 1, 875  





    000 ml @ 125 mls/hr IV .   





    Q8H [facility] Rx#:[id number]   


 


    cefTAZidime 2 gm In 100  





    Sodium Chloride 0.9% 100   





    ml @ 100 mls/hr IVPB Q12H   





    [facility] Rx#:[id number]   


 


  Oral 200  240


 


Output:   


 


  Urine 1320  


 


Other:   


 


  Voiding Method Indwelling Catheter  Toilet


 


  # Voids  2 1














- Exam





51-year-old woman who is currently complaining of pain at her axillary area.  

Now back in ICU feeling short of breath any evidence of hypotension fever and 

tachycardia.  T-max 102.3


 HEENT: Anicteric conjunctiva are pink and moist nasal mucosa grossly intact 

without significant lesions, there is no thrush.


Neck: The neck is supple without significant lymphadenopathy or thyromegaly.


Lungs: There is symmetrical air entry.  There are expiratory wheezes throughout 

the lung fields.  There is no bronchial sounds, no changes of egophony or 

dullness.


Heart: Tachycardic but Regular rate and rhythm with an audible S1-S2, no S3 

soft S4  There is no significant murmur click or rub, PMI was nondisplaced.


Abdomen: Obese, Positive bowel sounds soft and nontender without palpable 

masses or organomegaly.  There was no guarding or rebound.


Extremities: The upper extremities have excellent pulses they are symmetric, no 

significant petechiae or telangiectasia.  No splinter hemorrhages were noted.  

Lower extremities has minimal trace edema


The right axillary area shows evidence the recent surgical intervention.  

Please see the nursing photography for the exact location of the surgical 

wounds.  She is evidence of the 2 open areas.  The 2  are communicating 

underneath the skin bridge.  Measuring at 6 x 2.5 x 3 but has a 8 cm tunnel 





Neuro: Awake alert oriented to person place and time.  There are no acute new 

gross focal sensory motor deficits but is very anxious








- Labs


CBC & Chem 7: 


 03/24/17 08:08





 03/24/17 08:08


Labs: 


 Abnormal Lab Results - Last 24 Hours (Table)











  03/23/17 03/24/17 03/24/17 Range/Units





  21:00 08:08 08:08 


 


RBC    3.08 L  (3.80-5.40)  m/uL


 


Hgb    9.7 L  (11.4-16.0)  gm/dL


 


Hct    30.1 L  (34.0-46.0)  %


 


Lymphocytes # (Manual)    0.7 L  (1.0-4.8)  k/uL


 


Potassium   3.4 L   (3.5-5.1)  mmol/L


 


Chloride   116 H   ()  mmol/L


 


Carbon Dioxide   13 L   (22-30)  mmol/L


 


POC Glucose (mg/dL)  133 H    (75-99)  mg/dL


 


Plasma Lactic Acid Robert     (0.7-2.0)  mmol/L


 


Calcium   8.2 L   (8.4-10.2)  mg/dL


 


AST   89 H   (14-36)  U/L


 


ALT   88 H   (9-52)  U/L


 


Total Protein   5.2 L   (6.3-8.2)  g/dL


 


Albumin   2.5 L   (3.5-5.0)  g/dL














  03/24/17 03/24/17 Range/Units





  08:08 12:50 


 


RBC    (3.80-5.40)  m/uL


 


Hgb    (11.4-16.0)  gm/dL


 


Hct    (34.0-46.0)  %


 


Lymphocytes # (Manual)    (1.0-4.8)  k/uL


 


Potassium    (3.5-5.1)  mmol/L


 


Chloride    ()  mmol/L


 


Carbon Dioxide    (22-30)  mmol/L


 


POC Glucose (mg/dL)    (75-99)  mg/dL


 


Plasma Lactic Acid Robert  2.3 H*  2.5 H*  (0.7-2.0)  mmol/L


 


Calcium    (8.4-10.2)  mg/dL


 


AST    (14-36)  U/L


 


ALT    (9-52)  U/L


 


Total Protein    (6.3-8.2)  g/dL


 


Albumin    (3.5-5.0)  g/dL








 Laboratory Results











WBC  3.8 k/uL (3.8-10.6)   03/24/17  08:08    


 


RBC  3.08 m/uL (3.80-5.40)  L  03/24/17  08:08    


 


Hgb  9.7 gm/dL (11.4-16.0)  L  03/24/17  08:08    


 


Hct  30.1 % (34.0-46.0)  L  03/24/17  08:08    


 


MCV  97.8 fL (80.0-100.0)   03/24/17  08:08    


 


MCH  31.7 pg (25.0-35.0)   03/24/17  08:08    


 


MCHC  32.4 g/dL (31.0-37.0)   03/24/17  08:08    


 


RDW  14.9 % (11.5-15.5)   03/24/17  08:08    


 


Plt Count  193 k/uL (150-450)   03/24/17  08:08    


 


Neutrophils % (Manual)  77.5 %  03/24/17  08:08    


 


Band Neutrophils %  0.5 %  03/24/17  08:08    


 


Lymphocytes % (Manual)  18.5 %  03/24/17  08:08    


 


Monocytes % (Manual)  1.5 %  03/24/17  08:08    


 


Eosinophils % (Manual)  2.0 %  03/24/17  08:08    


 


Neutrophils # (Manual)  3.0 k/uL (1.3-7.7)   03/24/17  08:08    


 


Lymphocytes # (Manual)  0.7 k/uL (1.0-4.8)  L  03/24/17  08:08    


 


Monocytes # (Manual)  0.1 k/uL (0-1.0)   03/24/17  08:08    


 


Eosinophils # (Manual)  0.1 k/uL (0-0.7)   03/24/17  08:08    


 


Nucleated RBCs  0 /100 WBC (0-0)   03/24/17  08:08    


 


Manual Slide Review  Performed   03/23/17  04:06    


 


Toxic Vacuolation  Present   03/22/17  15:09    


 


Polychromasia  Present   03/22/17  15:09    


 


Poikilocytosis (manual  Present   03/24/17  08:08    


 


Anisocytosis (manual)  Present   03/24/17  08:08    


 


PT  9.8 sec (9.0-12.0)   03/24/17  08:08    


 


INR  1.0  (<1.1)   03/24/17  08:08    


 


APTT  24.2 sec (22.0-30.0)   03/24/17  08:08    


 


Sodium  140 mmol/L (137-145)   03/24/17  08:08    


 


Potassium  3.4 mmol/L (3.5-5.1)  L  03/24/17  08:08    


 


Chloride  116 mmol/L ()  H  03/24/17  08:08    


 


Carbon Dioxide  13 mmol/L (22-30)  L  03/24/17  08:08    


 


Anion Gap  11 mmol/L  03/24/17  08:08    


 


BUN  14 mg/dL (7-17)   03/24/17  08:08    


 


Creatinine  0.80 mg/dL (0.52-1.04)   03/24/17  08:08    


 


Est GFR (MDRD) Af Amer  >60  (>60 ml/min/1.73 sqM)   03/24/17  08:08    


 


Est GFR (MDRD) Non-Af  >60  (>60 ml/min/1.73 sqM)   03/24/17  08:08    


 


Glucose  94 mg/dL (74-99)   03/24/17  08:08    


 


POC Glucose (mg/dL)  95 mg/dL (75-99)   03/24/17  11:06    


 


POC Glu Operater ID  John Armando   03/24/17  11:06    


 


Plasma Lactic Acid Robert  2.5 mmol/L (0.7-2.0)  H*  03/24/17  12:50    


 


Calcium  8.2 mg/dL (8.4-10.2)  L  03/24/17  08:08    


 


Phosphorus  2.6 mg/dL (2.5-4.5)   03/24/17  08:08    


 


Magnesium  2.0 mg/dL (1.6-2.3)   03/24/17  08:08    


 


Total Bilirubin  0.4 mg/dL (0.2-1.3)   03/24/17  08:08    


 


AST  89 U/L (14-36)  H  03/24/17  08:08    


 


ALT  88 U/L (9-52)  H  03/24/17  08:08    


 


Alkaline Phosphatase  95 U/L ()   03/24/17  08:08    


 


Total Creatine Kinase  343 U/L ()  H  03/22/17  15:09    


 


CK-MB (CK-2)  4.9 ng/mL (0.0-2.4)  H*  03/22/17  15:09    


 


CK-MB (CK-2) Rel Index  1.4   03/22/17  15:09    


 


Troponin I  0.048 ng/mL (0.000-0.034)  H*  03/22/17  15:09    


 


Total Protein  5.2 g/dL (6.3-8.2)  L  03/24/17  08:08    


 


Albumin  2.5 g/dL (3.5-5.0)  L  03/24/17  08:08    


 


Cortisol  27 ug/dL  03/22/17  15:09    


 


Urine Color  Yellow   03/22/17  15:41    


 


Urine Appearance  Cloudy  (Clear)  H  03/22/17  15:41    


 


Urine pH  5.0  (5.0-8.0)   03/22/17  15:41    


 


Ur Specific Gravity  1.006  (1.001-1.035)   03/22/17  15:41    


 


Urine Protein  Trace  (Negative)  H  03/22/17  15:41    


 


Urine Glucose (UA)  Negative  (Negative)   03/22/17  15:41    


 


Urine Ketones  Negative  (Negative)   03/22/17  15:41    


 


Urine Blood  Negative  (Negative)   03/22/17  15:41    


 


Urine Nitrite  Negative  (Negative)   03/22/17  15:41    


 


Urine Bilirubin  Negative  (Negative)   03/22/17  15:41    


 


Urine Urobilinogen  <2.0 mg/dL (<2.0)   03/22/17  15:41    


 


Ur Leukocyte Esterase  Negative  (Negative)   03/22/17  15:41    


 


Urine RBC  1 /hpf (0-5)   03/22/17  15:41    


 


Urine WBC  <1 /hpf (0-5)   03/22/17  15:41    


 


Ur Squamous Epith Cells  2 /hpf (0-4)   03/22/17  15:41    


 


Amorphous Sediment  Rare /hpf (None)  H  03/22/17  15:41    


 


Urine Bacteria  Few /hpf (None)  H  03/22/17  15:41    


 


Hyaline Casts  2 /lpf (0-2)   03/22/17  15:41    


 


Urine Mucus  Rare /hpf (None)  H  03/22/17  15:41    


 


Random Vancomycin  7.2 ug/mL  03/22/17  15:09    


 


Influenza Type A RNA  Not Detected  (Not Detectd)   03/23/17  01:00    


 


Influenza Type B (PCR)  Not Detected  (Not Detectd)   03/23/17  01:00    








 Microbiology





03/22/17 15:09   Blood   Blood Culture - Preliminary


                            No Growth after 48 hours


03/22/17 15:41   Urine,Catheterized   Urine Culture - Final











Assessment and Plan


(1) Abscess of right axilla


Narrative/Plan: 


51-year-old  female presents to the emergency center from home after 

evaluation by her home care nurse.  Patient was complaining of some chest pain 

some increasing shortness of breath and significant weakness.  She was 

suffering falls at home.  In the emergency center as noted there was evidence 

of a fibular fracture.


The patient admission to the emergency center without evidence of hypotension.  

This was persistent despite resuscitation.  She subsequently has been admitted 

to the intensive care unit for ongoing resuscitation and vasopressor therapy if 

needed.  Patient is feeling  better this evening.  He still has some concerns 

about her pain but was resting better today.


Local wound care to the arm will be done with the Aquacel silver rope.  This 

can be changed every other day.  She's been following the wound center.  

Surgery is following and when she is well enough unroofing of the skin bridge 

of the right axillary area will allow placement of the standard negative 

pressure therapy system to help his extensive abscess healed.


Is noted recent cultures are negative.


At admission the lactic acid was elevated  improved with hydration [place] with 

recurrent fever has mildly increased again.





At this time concern will be to a gram-negative superinfection while she has 

been on and by therapy with vancomycin.  Consequently Fortaz was added while 

cultures are pending.  Multiple cultures are been requested





Patient does have difficulty with recurrent vaginal yeast infection due to the 

added back therapy and Diflucan is added





Patient is a history of pituitary tumor and with her current symptoms of 

weakness and falls with resulting fracture is of great concern.  The patient is 

now having again hemodynamic  instability and fever.  Concern that this could 

be related to her pituitary tumor.  With this would like to have her 

transferred to a tertiary center where she can have neurosurgical evaluation.  

MRI is been requested.  Likely will be done at the tertiary center.





With her multiple falls is been seen by orthopedics.  X-rays have been 

requested especially to the shoulder she's having some ongoing pain.  X-rays 

without shoulder fracture.  She has now the specialty building requested for 

the left leg for the mildly displaced proximal tibial fracture.  Case is 

discussed with the team and transferred [facility] is in process.








Status: Acute   





(2) MRSA infection


Status: Chronic   





(3) Gram-negative infection


Status: Acute

## 2017-03-24 NOTE — P.PN
Subjective


Principal diagnosis: 





acute sepsis and septic shock





this is a 51-year-old female with known history of MRSA infection involving her 

right axillary area.  Patient had incision and drainage of an abscess in the 

right axillary region, and she was treated with IV antibiotics, utilizing 

vancomycin and teflaro on outpatient basis via PICC line until the insurance 

company would not cover the antibiotic any further.  Patient was definitely 

showing improvement according to Dr. Abdul, however the patient was seen in 

the ER yesterday with sudden onset of chest pain associated with shortness of 

breath and weakness.  Patient was also noted to be hypotensive upon arrival to 

the ER.  No documented fever chills or rigors.  Patient was resuscitated with 

fluids upon arrival to the ER with at least 2-3 L given before transferring the 

patient to the ICU.  Upon arrival to the ICU, patient was having more 

hypotensive episodes, and we started norepinephrine for a short period of time.

  This was later discontinued, and upon my evaluation early this morning, the 

norepinephrine was discontinued, her hemodynamics are stable, and her IV fluid 

is still at 1 25 mL per hour.  Patient was given empiric antibiotics as per Dr. Abdul including daptomycin.and Fortaz.  Upon my evaluation, patient was noted 

to be in no form of respiratory distress, hemodynamically stable, sitting in bed

, relatively asymptomatic except for pain in her right shoulder area and 

axillary region.  Patient had a recent fibular fracture of the left lower 

extremity.  This is being addressed by orthopedics on the case.





Patient was reevaluated todayshe is spiking temps as high as 101.0 last night, 

patient is developing a bit of shortness of breath and some wheezing.  

Tachycardia, and lactic acid seems to be a bit elevated this morning.  Patient 

was transferred back to the ICU, chest x-ray showed cardiomegaly and mild 

vascular congestion, no clear-cut infiltrate, and no evidence of robbi 

pulmonary edema.  For her lactic acid, patient is receiving fluid boluses which 

I will minimize as much as possible because of her chest x-ray showing mild 

vascular congestion and cardiomegaly.  Patient will also be placed on Xopenex 

updrafts 4 times a day and when necessary.  May even consider diuresing the 

patient as long as her blood pressure remains stable.  Presently she is 

hemodynamically stable, she is tachycardic and tachypneic.  Family is at bedside

, and they seem to be frustrated that she is not getting any better I suggested 

they may want to consider referring the patient to a tertiary care center.  

They will discuss this with the admitting physician they seem to be relieved 

knowing that patient could be considered for transfer.labs were reviewed WBC 

count is 3.8 hemoglobin is 9.7 electrolytes are relatively normal however her 

bicarb is 13 and the patient has what seems to be a picture of hyperchloremic 

non-anion gap metabolic acidosis.  Her anion gap is only 11.lactic acid earlier 

today was 2.3 follow-up lactic acid was 2.5.








Objective





- Vital Signs


Vital signs: 


 Vital Signs











Temp  98.7 F   03/24/17 11:42


 


Pulse  100   03/24/17 12:00


 


Resp  30 H  03/24/17 12:00


 


BP  135/82   03/24/17 12:00


 


Pulse Ox  99   03/24/17 12:00








 Intake & Output











 03/23/17 03/24/17 03/24/17





 18:59 06:59 18:59


 


Intake Total 1425  240


 


Output Total 1320  


 


Balance 105  240


 


Weight 93.3 kg  98.6 kg


 


Intake:   


 


  Intake, IV Titration 1225  





  Amount   


 


    Potassium Phosphate 10 250  





    mmol In Sodium Chloride 0   





    .9% 250 ml @ 125 mls/hr   





    IV ONCE ONE Rx#:[id number]   


 


    Sodium Chloride 0.9% 1, 875  





    000 ml @ 125 mls/hr IV .   





    Q8H [facility] Rx#:[id number]   


 


    cefTAZidime 2 gm In 100  





    Sodium Chloride 0.9% 100   





    ml @ 100 mls/hr IVPB Q12H   





    [facility] Rx#:[id number]   


 


  Oral 200  240


 


Output:   


 


  Urine 1320  


 


Other:   


 


  Voiding Method Indwelling Catheter  


 


  # Voids  2 1














- Exam





Physical Examfollow revealed a 51-year-old female in mild respiratory distress, 

noted to be tachypneic and tachycardic.


HEENT:[Neck is supple.] [No neck masses.] [No thyromegaly.] [No JVD.]there is 

evidence of an old tracheostomy scar.


Chest: [crackles were noted at the bases, some wheezing on forced expiratory 

maneuver..]


Cardiac Exam: [tachycardic,Normal S1 and S2, no S3 gallop, no murmur.]


Abdomen: [Soft, nontender,  no megaly, no rebound, no guarding, normal bowel 

sounds.]


Extremities: [No clubbing, no edema, no cyanosis.]


Neurological Exam: [No focal neurologic deficit.]





- Labs


CBC & Chem 7: 


 03/24/17 08:08





 03/24/17 08:08


Labs: 


 Abnormal Lab Results - Last 24 Hours (Table)











  03/23/17 03/24/17 03/24/17 Range/Units





  21:00 08:08 08:08 


 


RBC    3.08 L  (3.80-5.40)  m/uL


 


Hgb    9.7 L  (11.4-16.0)  gm/dL


 


Hct    30.1 L  (34.0-46.0)  %


 


Lymphocytes # (Manual)    0.7 L  (1.0-4.8)  k/uL


 


Potassium   3.4 L   (3.5-5.1)  mmol/L


 


Chloride   116 H   ()  mmol/L


 


Carbon Dioxide   13 L   (22-30)  mmol/L


 


POC Glucose (mg/dL)  133 H    (75-99)  mg/dL


 


Plasma Lactic Acid Robert     (0.7-2.0)  mmol/L


 


Calcium   8.2 L   (8.4-10.2)  mg/dL


 


AST   89 H   (14-36)  U/L


 


ALT   88 H   (9-52)  U/L


 


Total Protein   5.2 L   (6.3-8.2)  g/dL


 


Albumin   2.5 L   (3.5-5.0)  g/dL














  03/24/17 03/24/17 Range/Units





  08:08 12:50 


 


RBC    (3.80-5.40)  m/uL


 


Hgb    (11.4-16.0)  gm/dL


 


Hct    (34.0-46.0)  %


 


Lymphocytes # (Manual)    (1.0-4.8)  k/uL


 


Potassium    (3.5-5.1)  mmol/L


 


Chloride    ()  mmol/L


 


Carbon Dioxide    (22-30)  mmol/L


 


POC Glucose (mg/dL)    (75-99)  mg/dL


 


Plasma Lactic Acid Robert  2.3 H*  2.5 H*  (0.7-2.0)  mmol/L


 


Calcium    (8.4-10.2)  mg/dL


 


AST    (14-36)  U/L


 


ALT    (9-52)  U/L


 


Total Protein    (6.3-8.2)  g/dL


 


Albumin    (3.5-5.0)  g/dL














Assessment and Plan


Plan: 





impression: Acute sepsis and septic shock related to right axillary abscess, 

status post recent incision and drainage of abscess.patient received fluid 

resuscitation and she required a brief period of pressors/norepinephrine to 

maintain adequate blood pressure.  Presently off norepinephrine.





2 history of pituitary tumor, may eventually need further MRI studies and 

comparison to previous studies.





3 subacute fibular fracture being addressed by orthopedics on the case.





4 acute renal failure secondary to sepsis and possible acute tubular 

necrosis.however renal functioning improved significantly with fluid 

resuscitation and improvement in her hemodynamic status.





Recommendation:patient was transferred out of the ICU yesterday, but she is 

back in the ICU today, mostly because of her ongoing fever, tachypnea, 

tachycardia, and elevated lactic acid.  We'll continue present supportive care 

measures, continue to treat as per the sepsis protocol, discussed her condition 

with her daughter at bedside, and she seems to be very inclined to consider 

transfer to [facility].  We will notify the admitting physician and 

his assistant will likely contact the transfer team at [facility].  In the 

meantime continue antibiotics, and continue supportive care measures.  

Discussed her condition with family at bedside.  Critical care time is 32 

minutes.


Time with Patient: Greater than 30

## 2017-03-24 NOTE — P.PN
Subjective


Principal diagnosis: 





Proximal fibula fracture left leg





his is a 51-year-old female admitted with sepsis.  She reportedly fell prior to 

her admission, sustaining injury to her left lower extremity.  X-rays of her 

ankle and foot were taken yesterday.  There appears to be a distal fibular 

avulsion fracture on x-ray which correlates with her point of tenderness.  She 

has been transferred to the [facility] unit from ICU.  She is stable from an 

orthopedic standpoint.





Objective





- Vital Signs


Vital signs: 


 Vital Signs











Temp  103.0 F H  03/24/17 07:00


 


Pulse  117 H  03/24/17 07:00


 


Resp  40 H  03/24/17 07:00


 


BP  140/75   03/24/17 07:00


 


Pulse Ox  95   03/24/17 07:00








 Intake & Output











 03/23/17 03/24/17 03/24/17





 18:59 06:59 18:59


 


Intake Total 1425  


 


Output Total 1320  


 


Balance 105  


 


Weight 93.3 kg  


 


Intake:   


 


  Intake, IV Titration 1225  





  Amount   


 


    Potassium Phosphate 10 250  





    mmol In Sodium Chloride 0   





    .9% 250 ml @ 125 mls/hr   





    IV ONCE ONE Rx#:[id number]   


 


    Sodium Chloride 0.9% 1, 875  





    000 ml @ 125 mls/hr IV .   





    Q8H [facility] Rx#:[id number]   


 


    cefTAZidime 2 gm In 100  





    Sodium Chloride 0.9% 100   





    ml @ 100 mls/hr IVPB Q12H   





    [facility] Rx#:[id number]   


 


  Oral 200  


 


Output:   


 


  Urine 1320  


 


Other:   


 


  Voiding Method Indwelling Catheter  


 


  # Voids  2 














- Exam





This is a pleasant 51-year-old female in no acute distress.  She is alert and 

oriented 3.  Exam of the left lower extremity reveals mild swelling to the 

foot and ankle.  There is tenderness to palpation about the proximal fibula.  

There is also tenderness about the distal fibula and medial mid-foot.  She has 

full foot and ankle motion without difficulty.  Neurovascular status to the 

lower extremities intact.





- Labs


CBC & Chem 7: 


 03/23/17 04:06





 03/23/17 11:56


Labs: 


 Abnormal Lab Results - Last 24 Hours (Table)











  03/23/17 03/23/17 Range/Units





  11:56 21:00 


 


Potassium  3.2 L   (3.5-5.1)  mmol/L


 


POC Glucose (mg/dL)   133 H  (75-99)  mg/dL














Assessment and Plan


(1) Left fibular fracture


Status: Acute   





(2) Abscess of right axilla


Status: Acute   





(3) Cellulitis due to MRSA


Status: Chronic   





(4) Left lateral ankle pain


Status: Acute   





(5) Left foot pain


Status: Acute   





(6) Right shoulder pain


Status: Chronic   





(7) Avulsion fracture of lateral malleolus of left fibula


Status: Acute   


Plan: 





The clinical and x-ray findings are discussed with the patient.  Natural 

history of this type of fracture discussed.  I recommended an equalizer boot 

for the left lower extremity.  She may possibly have an avulsion off the medial 

midfoot as well.  She may bear weight as tolerated in the boot.  I recommended 

a walker for balance.  She is follow-up in our office in 2 weeks for re-x-ray 

and evaluation.

## 2017-03-24 NOTE — P.PN
Subjective


Principal diagnosis: 





Right axillary wound





The patient is a 51-year-old female with a history of right axillary wound 

including recurrent hypotension.  Initially she was seen on the floor now she 

is transferred to the intensive care unit.  With her history of pituitary tumor 

now she being evaluated for transfer to [facility] for further medical 

management.  As for her right axilla, she continues with Aquacel dressings.





Objective





- Vital Signs


Vital signs: 


 Vital Signs











Temp  98.7 F   03/24/17 11:42


 


Pulse  100   03/24/17 15:00


 


Resp  30 H  03/24/17 15:00


 


BP  140/80   03/24/17 15:00


 


Pulse Ox  95   03/24/17 15:00








 Intake & Output











 03/23/17 03/24/17 03/24/17





 18:59 06:59 18:59


 


Intake Total 1425  1240


 


Output Total 1320  


 


Balance 105  1240


 


Weight 93.3 kg  98.6 kg


 


Intake:   


 


  IV   1000


 


    Sodium Chloride 0.9% 1,   1000





    000 ml @ 125 mls/hr IV .   





    Q8H CIPRIANO Rx#:[id number]   


 


  Intake, IV Titration 1225  





  Amount   


 


    Potassium Phosphate 10 250  





    mmol In Sodium Chloride 0   





    .9% 250 ml @ 125 mls/hr   





    IV ONCE ONE Rx#:[id number]   


 


    Sodium Chloride 0.9% 1, 875  





    000 ml @ 125 mls/hr IV .   





    Q8H CIPRIANO Rx#:[id number]   


 


    cefTAZidime 2 gm In 100  





    Sodium Chloride 0.9% 100   





    ml @ 100 mls/hr IVPB Q12H   





    CIPRIANO Rx#:[id number]   


 


  Oral 200  240


 


Output:   


 


  Urine 1320  


 


Other:   


 


  Voiding Method Indwelling Catheter  


 


  # Voids  2 1














- Exam








GENERAL:  Well developed and in no acute distress. Pleasant.


HEENT:  No sclera icterus. Extraocular movements grossly intact.  Moist buccal 

mucosa. Head is atraumatic, normocephalic. Hears conversational speech. No 

nasal drainage.


NECK:  Supple without lymphadenopathy. No JV distention.


CHEST:  Non-labored respirations and equal bilateral excursions. 


CARDIOVASCULAR:  Regular rate and rhythm.  Palpable 2+ radial pulses.


ABDOMEN:  Soft, nontender.  Nondistended.


MUSCULOSKELETAL:  No clubbing, cyanosis or edema.  Dressing along the right 

axilla clean dry and intact.


NEUROLOGIC:  No focal or lateralizing signs. 


PSYCH:  Appropriate affect.  Alert and oriented to person, place and time.





- Labs


CBC & Chem 7: 


 03/24/17 08:08





 03/24/17 08:08


Labs: 


 Abnormal Lab Results - Last 24 Hours (Table)











  03/23/17 03/24/17 03/24/17 Range/Units





  21:00 08:08 08:08 


 


RBC    3.08 L  (3.80-5.40)  m/uL


 


Hgb    9.7 L  (11.4-16.0)  gm/dL


 


Hct    30.1 L  (34.0-46.0)  %


 


Lymphocytes # (Manual)    0.7 L  (1.0-4.8)  k/uL


 


Potassium   3.4 L   (3.5-5.1)  mmol/L


 


Chloride   116 H   ()  mmol/L


 


Carbon Dioxide   13 L   (22-30)  mmol/L


 


POC Glucose (mg/dL)  133 H    (75-99)  mg/dL


 


Plasma Lactic Acid Robert     (0.7-2.0)  mmol/L


 


Calcium   8.2 L   (8.4-10.2)  mg/dL


 


AST   89 H   (14-36)  U/L


 


ALT   88 H   (9-52)  U/L


 


Total Protein   5.2 L   (6.3-8.2)  g/dL


 


Albumin   2.5 L   (3.5-5.0)  g/dL














  03/24/17 03/24/17 Range/Units





  08:08 12:50 


 


RBC    (3.80-5.40)  m/uL


 


Hgb    (11.4-16.0)  gm/dL


 


Hct    (34.0-46.0)  %


 


Lymphocytes # (Manual)    (1.0-4.8)  k/uL


 


Potassium    (3.5-5.1)  mmol/L


 


Chloride    ()  mmol/L


 


Carbon Dioxide    (22-30)  mmol/L


 


POC Glucose (mg/dL)    (75-99)  mg/dL


 


Plasma Lactic Acid Robert  2.3 H*  2.5 H*  (0.7-2.0)  mmol/L


 


Calcium    (8.4-10.2)  mg/dL


 


AST    (14-36)  U/L


 


ALT    (9-52)  U/L


 


Total Protein    (6.3-8.2)  g/dL


 


Albumin    (3.5-5.0)  g/dL














Assessment and Plan


(1) Acute renal failure


Status: Acute   





(2) Hypokalemia


Status: Acute   





(3) Hypophosphatemia


Status: Acute   





(4) Right shoulder pain


Status: Chronic   





(5) Cellulitis due to MRSA


Status: Chronic   





(6) MRSA infection


Status: Chronic   





(7) Iron deficiency anemia


Status: Acute   


Plan: 





1.  Currently, the patient has recurrent hypotension with her pituitary tumor, 

she has been advised for transfer to [facility].


2.  Otherwise, on exam no acute surgical intervention needed along the right 

axilla.


3.  Transfer to tertiary facility per primary team.

## 2017-04-10 ENCOUNTER — HOSPITAL ENCOUNTER (OUTPATIENT)
Dept: HOSPITAL 47 - PNWHC3 | Age: 52
Discharge: HOME | End: 2017-04-10
Payer: COMMERCIAL

## 2017-04-10 VITALS
TEMPERATURE: 98.8 F | SYSTOLIC BLOOD PRESSURE: 126 MMHG | DIASTOLIC BLOOD PRESSURE: 77 MMHG | RESPIRATION RATE: 16 BRPM | HEART RATE: 117 BPM

## 2017-04-10 DIAGNOSIS — M47.816: ICD-10-CM

## 2017-04-10 DIAGNOSIS — M51.36: Primary | ICD-10-CM

## 2017-04-10 DIAGNOSIS — M46.86: ICD-10-CM

## 2017-04-10 DIAGNOSIS — M47.812: ICD-10-CM

## 2017-04-10 PROCEDURE — 99211 OFF/OP EST MAY X REQ PHY/QHP: CPT

## 2017-04-10 NOTE — P.PN
Subjective


     This is follow-up visit for this patient with a history of severe and 

chronic neck pain and  low back pain secondary to cervical spondylosi   


     lumbar spondylosis with facet arthropathy, several months ago we did 

radiofrequency ablation of the medial branch cervical area, and she reports


     that her  Pain improved significantly after the radiofrequency, and 

currently she is complaining of severe low back pain, is not radiated to the 


     To the lower extremities, she denies any motor or sensory deficit, he 

denies any fever or night sweats, and no change in bowel movements 


     Or urination .  She is currently on Lyrica 50 mg every 8 hours, baclofen 10

-20 daily at bed times, Norco 10/325 every 6 hours when necessary 


     Patient denies any side effects of the medication, denies excessive 

drowsiness or sleepiness, denies suicidal ideation,   


     and reports that the current pain medication is NOT helping  To  control 

the  pain and improve activity of daily living


    


     Physical Examinations  :


    1-Constitutiona           : Cooperative , not in acute distress .


     2-HEENT                  :  nech ;  supple ,  no Lymphadenopathy  , no 

Thyromegaly  , normal  thyroid  size .


                                                     eyes  :  no ptosis , no 

icterus,  no photophobia .  


                                                      ENT  : normal    of 

hearing  , normal  oropharynx     , no Thrush .  


    3- Respiratory            : Chest clear to auscultations Bilaterally  ,  no 

wheezing   , no Rhonchi   .  


    4- Cardiovascular      :  regular rate and rhythem , S1 ,  S2  ,   no  S3 ,

  no  S4.


    5- Gastrointestinal     : abdomen soft  no tenderness , bowel sounds 

positive all four quadrents   , no organomegally  .


    6- Genitourinary        :  Defferred .                                     

                                                                               

                                                                               

                                                                               

                                                                               

                   


    7- neurologic            :  Cranial nerve II   to  XII  intact ,  no   

focal neurological deffecit  .


    8-psychatric              : alert ,  oriented  X 3  ,   appropriate affect 

  , intact judgment  and insight  .  


   9-Lymphatic               :  no Lymphadenopathy .


  10- musculoskeltal      :     


        exams of the cervical spine =  motor strength normal  bilateral upper 

extremities 


                                                 facet loading test cervical 

area   positive.                                                               

                                                                               

                                                                               

                                                                               

                                                                               

                                   


       exams of the Lumber spine  =   motor strength  lower extremities ,thigh 

and legs  .5/5 


       deep tendon reflexes :   normal  Knee Jerk    , normal   ankle Jerk  .


       lumber facet Loading Test  positive 


       strait leg raising test   positive at  30  degree , RT ,LT   ,  


       Fabere test positive RT    and  positive  LT .


       Range of motion: Range of motion in flexion of the lumbar spine  30 

degrees


       Range of motion range of motion of extension of the lumbar spine 10


   Diagnostic studies= MRI of the lumbar spine done December 2015 at [facility], L3 4 lumbar herniated disc disease


                               Multilevel lumbar facet arthropathy





   Assessment and plan =


-      Chronic low back pain secondary to lumbar degenerative disc disease , 

lumbar spondylosis with facet arthropathy without myelopathy , 


        Chronic neck pain secondary to cervical spondylosis status post 

radiofrequency ablation of the cervical medial branches


        Discher reported that her neck pain improved significantly after the 

radiofrequency of the medial branch                                            

                                                                               

                                                                               

                                                                               

                                                                               

                                                                               

                                                                               

   -      diagnoses, prognosis, and treatment options including but not limited 

to physical therapy, surgical interventions, interventional therapies ,  


       and medication management including narcotics and adjuvant medication  

were discussed with the patient and all    


       The questions answered


                                  


        -procedure= scheduled patient to have diagnostic medial branch block 

lumbar area at L3/L4 5/L5-S1 bilaterally.


         Medication management= patient getting her medication refilled from 

her primary care








                                                  














Objective





- Vital Signs


Vital signs: 


 Vital Signs











Temp  98.8 F   04/10/17 13:18


 


Pulse  117 H  04/10/17 13:18


 


Resp  16   04/10/17 13:18


 


BP  126/77   04/10/17 13:18


 


Pulse Ox  98   04/10/17 13:18








 Intake & Output











 04/09/17 04/10/17 04/10/17





 18:59 06:59 18:59


 


Weight   86.183 kg

## 2017-04-24 ENCOUNTER — HOSPITAL ENCOUNTER (OUTPATIENT)
Dept: HOSPITAL 47 - ORPAIN | Age: 52
Discharge: HOME | End: 2017-04-24
Payer: COMMERCIAL

## 2017-04-24 VITALS — SYSTOLIC BLOOD PRESSURE: 102 MMHG | DIASTOLIC BLOOD PRESSURE: 68 MMHG | HEART RATE: 86 BPM

## 2017-04-24 VITALS — RESPIRATION RATE: 16 BRPM

## 2017-04-24 VITALS — BODY MASS INDEX: 32.5 KG/M2

## 2017-04-24 VITALS — TEMPERATURE: 98.1 F

## 2017-04-24 DIAGNOSIS — M47.816: Primary | ICD-10-CM

## 2017-04-24 LAB — GLUCOSE BLD-MCNC: 107 MG/DL (ref 75–99)

## 2017-04-24 PROCEDURE — 64495 INJ PARAVERT F JNT L/S 3 LEV: CPT

## 2017-04-24 PROCEDURE — 64493 INJ PARAVERT F JNT L/S 1 LEV: CPT

## 2017-04-24 PROCEDURE — 99152 MOD SED SAME PHYS/QHP 5/>YRS: CPT

## 2017-04-24 PROCEDURE — 64494 INJ PARAVERT F JNT L/S 2 LEV: CPT

## 2017-04-24 NOTE — P.PCN
Date of Procedure: 04/24/17


Preoperative Diagnosis: 


Lumbar spondylosis without myelopathy


Postoperative Diagnosis: 


Same as above


Procedure(s) Performed: 


Lumbar bilateral medial branch block under fluoroscopic guidance


Anesthesia: MAC


Surgeon: Loly Carbone


Pathology: none sent


Condition: stable


Disposition: PACU


Description of Procedure: 


The patient was seen in preop holding area consent was obtained then she was 

brought into the procedure room and placed in prone position.  Skin was prepped 

with ChloraPrep and draped in a sterile manner.  Lidocaine 1% was used to numb 

the skin up at the target points that were chosen as follows: For the L5-S1 

level which corresponds to the dorsal ramus of L5 the target points were at the 

superior medial aspect of the sacral alar on each side of the spine on the AP 

view of fluoroscopy.  And for the L3 and L4 medial branches the target points 

were the connection between the transverse process and the superior articular 

process of L4 and L5 vertebra respectively on the oblique view of fluoroscopy.  

I used 22-gauge 3-1/2 inch Quincke spinal needle for this procedure and after 

contacting bone at the target points mentioned above I injected 1 mL of 

Marcaine 0.5%.  I did not use steroids with this procedure.  The patient 

received 2 mg of Versed and 100 g of fentanyl IV for sedation.  Patient 

tolerated procedure well.

## 2017-04-24 NOTE — FL
EXAMINATION TYPE: FL guided pain mgmt statistic

 

DATE OF EXAM: 4/24/2017 8:21 AM

 

HISTORY: Flouroscopy  time

 

8 seconds of fluoroscopy provided. 

 

IMPRESSION:

1. Fluoroscopy time.

## 2017-04-28 NOTE — CDI
Dear Dr. Carbone,



The Procedure note documents MAC sedation was provided.  On the Pain Procedure 
Record, however, Moderate Sedation is checked under Anesthesia Plan.  This is 
conflicting documentation that need clarification.  MAC anesthesia is monitored 
by the Anesthesia Department.  



Please claridy if the sedation provided SERA Wu was MAC (Monitored 
Anesthesia Care) or Moderate/conscious sedation. 



PLEASE DOCUMENT THIS CLARIFICATION AS AN ADDENDUM TO THE PROCEDURE NOTE. 



If you have any questions or concerns regarding this query, you may contact 
Ayleen Chong,  at [phone] Monday thru Friday 8:00am to 6
:00 pm.  



Thank you for your time,



Lacey PhillipsRutland Heights State Hospital

Outpatient 

Lio [email or web address]
[email or web address]

## 2017-05-22 NOTE — P.PN
Progress Note - Text





This is an addendum to the procedure note done on 04/24/2017.


The procedure was done under moderate sedation using 2 mg of IV Versed.

## 2017-07-13 ENCOUNTER — HOSPITAL ENCOUNTER (OUTPATIENT)
Dept: HOSPITAL 47 - ORPAIN | Age: 52
Discharge: HOME | End: 2017-07-13
Payer: COMMERCIAL

## 2017-07-13 VITALS — SYSTOLIC BLOOD PRESSURE: 110 MMHG | HEART RATE: 87 BPM | DIASTOLIC BLOOD PRESSURE: 60 MMHG

## 2017-07-13 VITALS — BODY MASS INDEX: 32.5 KG/M2

## 2017-07-13 VITALS — TEMPERATURE: 98.3 F | RESPIRATION RATE: 16 BRPM

## 2017-07-13 DIAGNOSIS — M47.816: Primary | ICD-10-CM

## 2017-07-13 PROCEDURE — 64494 INJ PARAVERT F JNT L/S 2 LEV: CPT

## 2017-07-13 PROCEDURE — 64495 INJ PARAVERT F JNT L/S 3 LEV: CPT

## 2017-07-13 PROCEDURE — 64493 INJ PARAVERT F JNT L/S 1 LEV: CPT

## 2017-07-13 NOTE — P.PCN
Date of Procedure: 07/13/17


Preoperative Diagnosis: 


Lumbar spondylosis without myelopathy


Postoperative Diagnosis: 


Same as above


Procedure(s) Performed: 


Lumbar bilateral medial branch block under fluoroscopic guidance


Implants: 





Anesthesia: other (Local with IV sedation)


Surgeon: Loly Carbone


Pathology: none sent


Condition: stable


Disposition: PACU


Indications for Procedure: 





Operative Findings: 





Description of Procedure: 


The patient was seen in preop holding area consent was obtained then she was 

brought into the procedure 1 placed in prone position.  Skin was prepped with 

DuraPrep and draped in a sterile manner.  Lidocaine 1% Was used to numb the 

skin up at the target points that were chosen as follows: For the L5-S1 level 

which corresponds to the dorsal ramus of L5 the target points were at the 

superior medial aspect of the sacral ala on each side of the spine on the AP 

view of fluoroscopy, and for the L3 and L4 medial branches the target points 

were at the connection between the transverse process and the superior to go 

process of L4 and L5 vertebra respectively on the oblique view of fluoroscopy.  

I used 22-gauge 3-1/2 inch Quincke spinal needles for this procedure and after 

contacting bone at the target points mentioned above I injected 1 mL of 

Marcaine 0.5%.  I did not use any steroids for this procedure.  However the 

patient received 100 g of fentanyl and 2 mg of Versed IV for sedation.

## 2017-07-13 NOTE — FL
EXAMINATION TYPE: FL guided pain mgmt statistic

 

DATE OF EXAM: 7/13/2017

 

HISTORY: Flouroscopy  time

 

8 seconds of fluoroscopy provided. 

 

IMPRESSION:

1. Fluoroscopy time.
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
## 2017-07-21 ENCOUNTER — HOSPITAL ENCOUNTER (INPATIENT)
Dept: HOSPITAL 47 - EC | Age: 52
LOS: 4 days | Discharge: NURSING, REHAB OR LONG-TERM CARE | DRG: 492 | End: 2017-07-25
Payer: COMMERCIAL

## 2017-07-21 VITALS — BODY MASS INDEX: 32.5 KG/M2
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
## 2017-07-21 DIAGNOSIS — R32: ICD-10-CM

## 2017-07-21 DIAGNOSIS — S82.841A: Primary | ICD-10-CM

## 2017-07-21 DIAGNOSIS — M79.7: ICD-10-CM

## 2017-07-21 DIAGNOSIS — M54.5: ICD-10-CM

## 2017-07-21 DIAGNOSIS — Z87.891: ICD-10-CM
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
## 2017-07-21 DIAGNOSIS — G90.2: ICD-10-CM

## 2017-07-21 DIAGNOSIS — Z79.899: ICD-10-CM

## 2017-07-21 DIAGNOSIS — D49.7: ICD-10-CM

## 2017-07-21 DIAGNOSIS — G89.29: ICD-10-CM

## 2017-07-21 DIAGNOSIS — G93.41: ICD-10-CM

## 2017-07-21 DIAGNOSIS — T50.2X5A: ICD-10-CM

## 2017-07-21 DIAGNOSIS — F90.9: ICD-10-CM

## 2017-07-21 DIAGNOSIS — N17.9: ICD-10-CM

## 2017-07-21 DIAGNOSIS — I10: ICD-10-CM

## 2017-07-21 DIAGNOSIS — Z82.49: ICD-10-CM
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
## 2017-07-21 DIAGNOSIS — F41.9: ICD-10-CM

## 2017-07-21 DIAGNOSIS — G43.909: ICD-10-CM

## 2017-07-21 DIAGNOSIS — Z86.14: ICD-10-CM

## 2017-07-21 DIAGNOSIS — E03.9: ICD-10-CM

## 2017-07-21 DIAGNOSIS — Z79.1: ICD-10-CM

## 2017-07-21 DIAGNOSIS — T39.395A: ICD-10-CM

## 2017-07-21 DIAGNOSIS — M54.2: ICD-10-CM

## 2017-07-21 DIAGNOSIS — E87.6: ICD-10-CM

## 2017-07-21 DIAGNOSIS — E87.2: ICD-10-CM

## 2017-07-21 DIAGNOSIS — R21: ICD-10-CM

## 2017-07-21 DIAGNOSIS — F32.9: ICD-10-CM

## 2017-07-21 LAB
ALP SERPL-CCNC: 94 U/L (ref 38–126)
ALT SERPL-CCNC: 64 U/L (ref 9–52)
ANION GAP SERPL CALC-SCNC: 15 MMOL/L
ANION GAP SERPL CALC-SCNC: 8 MMOL/L
APAP SPEC-MCNC: <10 UG/ML
AST SERPL-CCNC: 54 U/L (ref 14–36)
BASOPHILS # BLD AUTO: 0 K/UL (ref 0–0.2)
BASOPHILS NFR BLD AUTO: 0 %
BUN SERPL-SCNC: 44 MG/DL (ref 7–17)
BUN SERPL-SCNC: 62 MG/DL (ref 7–17)
CALCIUM SPEC-MCNC: 8.9 MG/DL (ref 8.4–10.2)
CALCIUM SPEC-MCNC: 9 MG/DL (ref 8.4–10.2)
CH: 31.4
CH: 31.5
CHCM: 32.9
CHCM: 33
CHLORIDE SERPL-SCNC: 107 MMOL/L (ref 98–107)
CHLORIDE SERPL-SCNC: 116 MMOL/L (ref 98–107)
CK SERPL-CCNC: 871 U/L (ref 30–135)
CO2 SERPL-SCNC: 18 MMOL/L (ref 22–30)
CO2 SERPL-SCNC: 19 MMOL/L (ref 22–30)
EOSINOPHIL # BLD AUTO: 0 K/UL (ref 0–0.7)
EOSINOPHIL NFR BLD AUTO: 0 %
ERYTHROCYTE [DISTWIDTH] IN BLOOD BY AUTOMATED COUNT: 3.7 M/UL (ref 3.8–5.4)
ERYTHROCYTE [DISTWIDTH] IN BLOOD BY AUTOMATED COUNT: 3.87 M/UL (ref 3.8–5.4)
ERYTHROCYTE [DISTWIDTH] IN BLOOD: 15.6 % (ref 11.5–15.5)
ERYTHROCYTE [DISTWIDTH] IN BLOOD: 15.8 % (ref 11.5–15.5)
GLUCOSE BLD-MCNC: 105 MG/DL (ref 75–99)
GLUCOSE SERPL-MCNC: 125 MG/DL (ref 74–99)
GLUCOSE SERPL-MCNC: 96 MG/DL (ref 74–99)
HCT VFR BLD AUTO: 35.6 % (ref 34–46)
HCT VFR BLD AUTO: 37.1 % (ref 34–46)
HDW: 2.76
HDW: 2.77
HGB BLD-MCNC: 11.6 GM/DL (ref 11.4–16)
HGB BLD-MCNC: 12.5 GM/DL (ref 11.4–16)
LUC NFR BLD AUTO: 2 %
LYMPHOCYTES # SPEC AUTO: 1.4 K/UL (ref 1–4.8)
LYMPHOCYTES NFR SPEC AUTO: 13 %
MCH RBC QN AUTO: 31.3 PG (ref 25–35)
MCH RBC QN AUTO: 32.2 PG (ref 25–35)
MCHC RBC AUTO-ENTMCNC: 32.5 G/DL (ref 31–37)
MCHC RBC AUTO-ENTMCNC: 33.6 G/DL (ref 31–37)
MCV RBC AUTO: 95.9 FL (ref 80–100)
MCV RBC AUTO: 96.5 FL (ref 80–100)
MONOCYTES # BLD AUTO: 0.6 K/UL (ref 0–1)
MONOCYTES NFR BLD AUTO: 5 %
NEUTROPHILS # BLD AUTO: 9 K/UL (ref 1.3–7.7)
NEUTROPHILS NFR BLD AUTO: 79 %
NON-AFRICAN AMERICAN GFR(MDRD): 17
NON-AFRICAN AMERICAN GFR(MDRD): 32
PARTICLE COUNT: 848
PH UR: 6 [PH] (ref 5–8)
POTASSIUM SERPL-SCNC: 3.2 MMOL/L (ref 3.5–5.1)
POTASSIUM SERPL-SCNC: 3.6 MMOL/L (ref 3.5–5.1)
PROT SERPL-MCNC: 6.6 G/DL (ref 6.3–8.2)
RBC UR QL: <1 /HPF (ref 0–5)
SALICYLATES SERPL-MCNC: <1 MG/DL
SODIUM SERPL-SCNC: 140 MMOL/L (ref 137–145)
SODIUM SERPL-SCNC: 143 MMOL/L (ref 137–145)
SP GR UR: 1.01 (ref 1–1.03)
TROPONIN I SERPL-MCNC: <0.012 NG/ML (ref 0–0.03)
UA BILLING (MACRO VS. MICRO): (no result)
UROBILINOGEN UR QL STRIP: <2 MG/DL (ref ?–2)
WBC # BLD AUTO: 0.25 10*3/UL
WBC # BLD AUTO: 10.8 K/UL (ref 3.8–10.6)
WBC # BLD AUTO: 11.3 K/UL (ref 3.8–10.6)
WBC (PEROX): 11.2

## 2017-07-21 PROCEDURE — 85610 PROTHROMBIN TIME: CPT

## 2017-07-21 PROCEDURE — 82140 ASSAY OF AMMONIA: CPT

## 2017-07-21 PROCEDURE — 82550 ASSAY OF CK (CPK): CPT

## 2017-07-21 PROCEDURE — 82553 CREATINE MB FRACTION: CPT

## 2017-07-21 PROCEDURE — 81001 URINALYSIS AUTO W/SCOPE: CPT

## 2017-07-21 PROCEDURE — 85025 COMPLETE CBC W/AUTO DIFF WBC: CPT

## 2017-07-21 PROCEDURE — 96361 HYDRATE IV INFUSION ADD-ON: CPT

## 2017-07-21 PROCEDURE — 83735 ASSAY OF MAGNESIUM: CPT

## 2017-07-21 PROCEDURE — 85730 THROMBOPLASTIN TIME PARTIAL: CPT

## 2017-07-21 PROCEDURE — 83520 IMMUNOASSAY QUANT NOS NONAB: CPT

## 2017-07-21 PROCEDURE — 80053 COMPREHEN METABOLIC PANEL: CPT

## 2017-07-21 PROCEDURE — 80306 DRUG TEST PRSMV INSTRMNT: CPT

## 2017-07-21 PROCEDURE — 71010: CPT

## 2017-07-21 PROCEDURE — 27752 TREATMENT OF TIBIA FRACTURE: CPT

## 2017-07-21 PROCEDURE — 51702 INSERT TEMP BLADDER CATH: CPT

## 2017-07-21 PROCEDURE — 80048 BASIC METABOLIC PNL TOTAL CA: CPT

## 2017-07-21 PROCEDURE — 85027 COMPLETE CBC AUTOMATED: CPT

## 2017-07-21 PROCEDURE — 0QSJ04Z REPOSITION RIGHT FIBULA WITH INTERNAL FIXATION DEVICE, OPEN APPROACH: ICD-10-PCS

## 2017-07-21 PROCEDURE — 72125 CT NECK SPINE W/O DYE: CPT

## 2017-07-21 PROCEDURE — 84484 ASSAY OF TROPONIN QUANT: CPT

## 2017-07-21 PROCEDURE — 93005 ELECTROCARDIOGRAM TRACING: CPT

## 2017-07-21 PROCEDURE — 87086 URINE CULTURE/COLONY COUNT: CPT

## 2017-07-21 PROCEDURE — 36415 COLL VENOUS BLD VENIPUNCTURE: CPT

## 2017-07-21 PROCEDURE — 0QSG04Z REPOSITION RIGHT TIBIA WITH INTERNAL FIXATION DEVICE, OPEN APPROACH: ICD-10-PCS

## 2017-07-21 PROCEDURE — 70450 CT HEAD/BRAIN W/O DYE: CPT

## 2017-07-21 PROCEDURE — 96374 THER/PROPH/DIAG INJ IV PUSH: CPT

## 2017-07-21 PROCEDURE — 80320 DRUG SCREEN QUANTALCOHOLS: CPT

## 2017-07-21 PROCEDURE — 99285 EMERGENCY DEPT VISIT HI MDM: CPT

## 2017-07-21 RX ADMIN — SODIUM CHLORIDE SCH MLS/HR: 9 INJECTION, SOLUTION INTRAVENOUS at 11:09

## 2017-07-21 RX ADMIN — PREGABALIN SCH MG: 50 CAPSULE ORAL at 12:07

## 2017-07-21 RX ADMIN — SODIUM CHLORIDE SCH MLS/HR: 9 INJECTION, SOLUTION INTRAVENOUS at 15:00

## 2017-07-21 RX ADMIN — FLUTICASONE PROPIONATE SCH SPRAY: 50 SPRAY, METERED NASAL at 09:23

## 2017-07-21 RX ADMIN — PREGABALIN SCH: 50 CAPSULE ORAL at 15:00

## 2017-07-21 RX ADMIN — SODIUM CHLORIDE SCH MLS/HR: 9 INJECTION, SOLUTION INTRAVENOUS at 12:08

## 2017-07-21 RX ADMIN — HEPARIN SODIUM SCH UNIT: 5000 INJECTION, SOLUTION INTRAVENOUS; SUBCUTANEOUS at 21:48

## 2017-07-21 RX ADMIN — THERA TABS SCH EACH: TAB at 12:08

## 2017-07-21 RX ADMIN — PANTOPRAZOLE SODIUM SCH MG: 40 TABLET, DELAYED RELEASE ORAL at 09:23

## 2017-07-21 RX ADMIN — HYDROCODONE BITARTRATE AND ACETAMINOPHEN PRN EACH: 10; 325 TABLET ORAL at 23:32

## 2017-07-21 RX ADMIN — LEVOTHYROXINE SODIUM SCH MCG: 50 TABLET ORAL at 09:23

## 2017-07-21 RX ADMIN — FLUTICASONE PROPIONATE SCH SPRAY: 50 SPRAY, METERED NASAL at 21:47

## 2017-07-21 RX ADMIN — Medication SCH MG: at 09:27

## 2017-07-21 RX ADMIN — SODIUM CHLORIDE SCH MLS/HR: 9 INJECTION, SOLUTION INTRAVENOUS at 09:27

## 2017-07-21 RX ADMIN — HYDROCODONE BITARTRATE AND ACETAMINOPHEN PRN EACH: 10; 325 TABLET ORAL at 18:21

## 2017-07-21 RX ADMIN — VENLAFAXINE HYDROCHLORIDE SCH MG: 75 CAPSULE, EXTENDED RELEASE ORAL at 09:23

## 2017-07-21 RX ADMIN — ESTROGENS, CONJUGATED SCH MG: 0.3 TABLET, FILM COATED ORAL at 12:09

## 2017-07-21 RX ADMIN — HEPARIN SODIUM SCH UNIT: 5000 INJECTION, SOLUTION INTRAVENOUS; SUBCUTANEOUS at 15:21

## 2017-07-21 RX ADMIN — Medication SCH MG: at 21:47

## 2017-07-21 RX ADMIN — PREGABALIN SCH MG: 50 CAPSULE ORAL at 23:32

## 2017-07-21 NOTE — CT
EXAM:

  CT Head Without Intravenous Contrast

 

CLINICAL HISTORY:

  Reason: Pain

 

TECHNIQUE:

  Axial computed tomography images of the head/brain without intravenous 

contrast.  CTDI is 57.4 mGy and DLP is 995.5 mGy-cm.  This CT exam was 

performed using one or more of the following dose reduction techniques: 

automated exposure control, adjustment of the mA and/or kV according to 

patient size, and/or use of iterative reconstruction technique.

  Coronal and sagittal reformatted images were created and reviewed.

 

COMPARISON:

  CT 3/22/17.

 

FINDINGS:

  Brain:  Unremarkable.  No hemorrhage.  No significant white matter 

disease.  No edema.

  Ventricles:  Unremarkable.  No ventriculomegaly.

  Bones/joints:  Unremarkable.  No acute fracture.

  Soft tissues:  Unremarkable.

  Sinuses:  Unremarkable as visualized.  No acute sinusitis.

  Mastoid air cells:  Unremarkable as visualized.  No mastoid effusion.

 

IMPRESSION:     

  Unremarkable head/brain CT.

 

_______________________________________________

 

EXAM:

  CT Cervical Spine Without Intravenous Contrast

 

CLINICAL HISTORY:

  Reason: Pain

 

TECHNIQUE:

  Axial computed tomography images of the cervical spine without 

intravenous contrast.  CTDI is 28.7 mGy and DLP is 548.5 mGy-cm.  This CT 

exam was performed using one or more of the following dose reduction 

techniques: automated exposure control, adjustment of the mA and/or kV 

according to patient size, and/or use of iterative reconstruction 

technique.

  Coronal and sagittal reformatted images were created and reviewed.

 

COMPARISON:

  CT 3/22/17.

 

FINDINGS:

  Vertebrae:  Loss of the normal cervical lordosis which may be secondary 

to positioning versus spasm.  Multilevel degenerative facet disease is 

seen.  No acute fracture.

  Discs/spinal canal/neural foramina:  Disc osteophyte complexes are 

suggested C5/C6, C6/C7 and C7/T1.  A degree of central stenosis is 

suspected at C5/C6 and C6/C7 with bilateral neuroforaminal stenosis.

  Soft tissues:  Unremarkable.

  Lung apices:  Unremarkable as visualized.

 

IMPRESSION:     

  No acute findings. Loss of the normal cervical lordosis which may be 

secondary to positioning versus spasm.  Cervical spondylosis as described.

## 2017-07-21 NOTE — XR
EXAM:

  XR Right Ankle Complete, 3 Views

 

CLINICAL HISTORY:

  Reason: Pain

 

TECHNIQUE:

  Frontal, lateral and oblique views of the right ankle.

 

COMPARISON:

  1 hour prior.

 

FINDINGS:

  Bones/joints:  Interval reduction and casting of the distal fibula and 

tibial fractures in anatomic alignment.

  Soft tissues: Obscured by cast.

 

IMPRESSION:     

  Interval reduction and casting of the distal fibula and tibial 

fractures in anatomic alignment.

## 2017-07-21 NOTE — P.CNOR
History of Present Illness





- [facility]


Consult date: 17


Consult reason: fracture


History of present illness: 





This is a 52-year-old female who was seen and evaluated today Marley Kathy Nieves.  Patient was brought to the emergency room early this morning due to a 

fall that happened in her home.  She states that she has felt very fatigued 

over the last few days and just randomly fell.  Upon arriving at the hospital, 

imaging and lab tests were done.  Images demonstrated a right bimalleolar ankle 

fracture with dislocation.  Patient was noted to be very lethargic during most 

of her visit, and also received a few doses of Narcan which seem not to improve 

her overall activity level.  A reduction maneuver was performed in the 

emergency room and postreduction films were obtained, this demonstrated a 

reduced ankle joint with evidence of a bimalleolar right ankle fracture.  She 

was admitted to the hospital under internal medicine group, our orthopedic team 

was then consulted with regards to the right ankle fracture.


Lab tests also demonstrated acute kidney injury, nephrology is also being 

consult.  Patient is a very detailed medical history, most significantly a 

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 

surgery for and multiple doses of antibiotics at the beginning of .  She is 

also been evaluated in 2017 with a avulsion fracture involving the left 

distal fibula, [facility] this problem.


Patient also has a psychiatric history, she states that she picks at her skin 

often.  There are multiple lesions noted on the patient's body with scab 

formation, they range from the face down to the lower extremities.


While examining the patient today bed today, she is very lethargic.  She doses 

in and out sleeping when I am asking her questions.  She answers most my 

questions adequately, but there is a level of confusion.  She states that she 

takes pain medication at home for her chronic lumbar and cervical pain, which 

she has had epidural injections 4.  Her urine drug screen was positive for 

opiates and amphetamines.


She denies any other pain involving the left lower extremity, bilateral upper 

extremities, new onset cervical, thoracic or lumbar area.





Review of Systems


Constitutional: Reports as per HPI





Past Medical History


Past Medical History: Eye Disorder, Fibromyalgia, Hypertension, Pneumonia, 

Thyroid Disorder


Additional Past Medical History / Comment(s): Jac's Syndrome R eye, 

HYPOGLYCEMIA, ANEMIA, MIGRAINES, pituitary tumor-no active chemo or radiation


History of Any Multi-Drug Resistant Organisms: MRSA


Year Discovered:: 2017


MDRO Source:: right armpit


Past Surgical History: Bariatric Surgery, Cholecystectomy, Hernia Repair, 

Hysterectomy, Orthopedic Surgery


Additional Past Surgical History / Comment(s): tummy tuck; foot surgery ( right 

side took out extra toe ), left foot surgery, TRACHEOSTOMY/ AND REMOVED


Past Anesthesia/Blood Transfusion Reactions: No Reported Reaction


Smoking Status: Former smoker





- Past Family History


  ** Mother


Family Medical History: Coronary Artery Disease (CAD)





  ** Father


Family Medical History: Cancer





Medications and Allergies


 Home Medications











 Medication  Instructions  Recorded  Confirmed  Type


 


Lisinopril-Hctz 20-25 mg 1 tab PO DAILY 08/12/15 07/21/17 History





[Zestoretic 20-25]    


 


Estrogens, Conjugated [Premarin] 0.3 mg PO DAILY 16 History


 


Venlafaxine HCl [Effexor XR] 225 mg PO DAILY 16 History


 


Baclofen 10 - 20 mg PO HS PRN 17 History


 


Fluticasone Nasal Spray [Flonase 2 spray EA NOSTRIL BID 17 

History





Nasal Spray]    


 


Levothyroxine Sodium [Synthroid] 50 mcg PO DAILY 17 History


 


Pantoprazole [Protonix] 40 mg PO DAILY 17 History


 


Furosemide [Lasix] 40 mg PO DAILY 17 History


 


Dextroamphetamine/Amphetamine 20 mg PO TID 17 History





[Adderall]    


 


HYDROcodone/APAP 10-325MG [Norco 1 tab PO Q4H PRN 17 History





]    


 


Multivitamins, Thera [Multivitamin 1 tab PO DAILY@1200 17 History





(formulary)]    


 


Cyanocobalamin [Vitamin B-12 1,000 mcg SQ QMONTH 17 History





Injection]    


 


Ibuprofen [Motrin] 800 mg PO TID PRN 17 History


 


Potassium Chloride ER [K-Dur 20] 20 meq PO DAILY 17 History


 


busPIRone HCl [Buspar] 5 mg PO BID 17 History











 Allergies











Allergy/AdvReac Type Severity Reaction Status Date / Time


 


No Known Allergies Allergy   Verified 17 08:33














Physical Examination





Right lower extremity:


Multiple skin lesions noted in the anterior thigh that range down to where the 

splint begins, there are scabs noted.  A posterior splint with Ace wrap is in 

good position.  Patient's sensation to light touch throughout the right lower 

extremity is intact, her Refills less than 3 seconds.  She is able to wiggle 

her toes.  There is pain reproduced with palpation throughout the medial 

lateral aspect of the right ankle.


No point tenderness present in the right knee, logroll maneuver reproduces no 

pain in the right hip.  She has difficulty with any motion involving the right 

lower extremity due to the pain is reproduced in the ankle.





Results





- Labs


Labs: 


 Abnormal Lab Results - Last 24 Hours (Table)











  17 Range/Units





  03:40 03:40 03:40 


 


WBC  11.3 H    (3.8-10.6)  k/uL


 


RDW  15.6 H    (11.5-15.5)  %


 


Neutrophils #  9.0 H    (1.3-7.7)  k/uL


 


Potassium   3.2 L   (3.5-5.1)  mmol/L


 


Chloride     ()  mmol/L


 


Carbon Dioxide   18 L   (22-30)  mmol/L


 


BUN   62 H   (7-17)  mg/dL


 


Creatinine   3.00 H   (0.52-1.04)  mg/dL


 


Glucose     (74-99)  mg/dL


 


POC Glucose (mg/dL)     (75-99)  mg/dL


 


AST   54 H   (14-36)  U/L


 


ALT   64 H   (9-52)  U/L


 


Total Creatine Kinase     ()  U/L


 


CK-MB (CK-2)     (0.0-2.4)  ng/mL


 


Urine Protein     (Negative)  


 


Urine Blood     (Negative)  


 


Hyaline Casts     (0-2)  /lpf


 


Urine Mucus     (None)  /hpf


 


Urine Opiates Screen    Detected H  (NotDetected)  


 


Ur Amphetamines Screen    Detected H  (NotDetected)  














  17 Range/Units





  03:40 03:40 03:57 


 


WBC     (3.8-10.6)  k/uL


 


RDW     (11.5-15.5)  %


 


Neutrophils #     (1.3-7.7)  k/uL


 


Potassium     (3.5-5.1)  mmol/L


 


Chloride     ()  mmol/L


 


Carbon Dioxide     (22-30)  mmol/L


 


BUN     (7-17)  mg/dL


 


Creatinine     (0.52-1.04)  mg/dL


 


Glucose     (74-99)  mg/dL


 


POC Glucose (mg/dL)    105 H  (75-99)  mg/dL


 


AST     (14-36)  U/L


 


ALT     (9-52)  U/L


 


Total Creatine Kinase   871 H   ()  U/L


 


CK-MB (CK-2)   23.3 H*   (0.0-2.4)  ng/mL


 


Urine Protein  Trace H    (Negative)  


 


Urine Blood  Small H    (Negative)  


 


Hyaline Casts  5 H    (0-2)  /lpf


 


Urine Mucus  Rare H    (None)  /hpf


 


Urine Opiates Screen     (NotDetected)  


 


Ur Amphetamines Screen     (NotDetected)  














  17 Range/Units





  11:21 


 


WBC   (3.8-10.6)  k/uL


 


RDW   (11.5-15.5)  %


 


Neutrophils #   (1.3-7.7)  k/uL


 


Potassium   (3.5-5.1)  mmol/L


 


Chloride  116 H  ()  mmol/L


 


Carbon Dioxide  19 L  (22-30)  mmol/L


 


BUN  44 H  (7-17)  mg/dL


 


Creatinine  1.67 H  (0.52-1.04)  mg/dL


 


Glucose  125 H  (74-99)  mg/dL


 


POC Glucose (mg/dL)   (75-99)  mg/dL


 


AST   (14-36)  U/L


 


ALT   (9-52)  U/L


 


Total Creatine Kinase   ()  U/L


 


CK-MB (CK-2)   (0.0-2.4)  ng/mL


 


Urine Protein   (Negative)  


 


Urine Blood   (Negative)  


 


Hyaline Casts   (0-2)  /lpf


 


Urine Mucus   (None)  /hpf


 


Urine Opiates Screen   (NotDetected)  


 


Ur Amphetamines Screen   (NotDetected)  








 H & H











  17 Range/Units





  03:40 


 


Hgb  12.5  (11.4-16.0)  gm/dL


 


Hct  37.1  (34.0-46.0)  %











Result Diagrams: 


 17 03:40





 17 11:21





- Diagnostic results


Ankle/Foot x-ray: report reviewed, image reviewed





Assessment and Plan


Plan: 





Imagin sets of x-rays were reviewed of the right ankle, both pre-and post reduction.

  Images do demonstrate an intact ankle joint, evidence of a bimalleolar 

fracture with both distal fibula and medial malleolus involvement.  The mortise 

joint remains intact with no significant widening noted.





Assessment:


1.  Right bimalleolar ankle fracture with dislocation, status post closed 

reduction by emergency room staff


2.  Status post fall from standing


3.  Acute kidney injury


4.  Other medical comorbidities





Plan:


1.  I was able to discuss this case, including both physical exam findings and 

imaging studies with Dr. Olvera.  We would like to proceed with surgical 

intervention, this being an open reduction and internal fixation procedure on 

the right ankle.  I was able to discuss both clinical and imaging findings with 

the patient briefly at bedside, due to her mental status and was limited.


2.  Due to the patient's mental status and her current kidney injury, patient 

is not a candidate for surgical intervention today.  We will continue to follow 

the patient and I will further discuss the case with her when she is more alert.


3.  Nonweightbearing right lower extremity, continues posterior splint


4.  Obtain consent as mental status improves


5.  Ice and elevate


6.  Other medical specialty recommendations


7.  GI and DVT prophylaxis


8.  Further recommendations to follow


Time with Patient: Less than 30

## 2017-07-21 NOTE — XR
EXAM:

  XR Right Ankle Complete, 2 Views

 

CLINICAL HISTORY:

  Reason: Pain

 

TECHNIQUE:

  Frontal and lateral views of the right ankle.

 

COMPARISON:

  No relevant prior studies available.

 

FINDINGS:

  Bones/joints:  Comminuted fracture of the medial malleolus and distal 

fibula.  The talus is laterally displaced in relation to the tibia.

  Soft tissues:  Soft tissue swelling.

 

IMPRESSION:     

  Comminuted fracture of the medial malleolus and distal fibula.  The 

talus is laterally displaced in relation to the tibia.

## 2017-07-21 NOTE — ED
General Adult HPI





- General


Source: patient, EMS, RN notes reviewed


Mode of arrival: EMS


Limitations: no limitations





<Rosalia Caraballo - Last Filed: 07/21/17 04:22>





<Eder Spivey - Last Filed: 07/21/17 04:55>





- General


Chief complaint: Extremity Injury, Lower


Stated complaint: ANKLE INJURY


Time Seen by Provider: 07/21/17 02:37





- History of Present Illness


Initial comments: 





52-year-old female presents to the emergency Department chief complaint of 

fall.  Patient states that her body just gave out in the bathroom today and she 

hurt her ankle.  Patient states she has been more sleepy than normal over the 

past few days and has had numerous falls.  There is been no nausea vomiting 

fever chills and the patient.  They state that she is just been more tired than 

normal. the patient denies any other pain.Patient denies any recent fever, 

chills, shortness of breath, chest pain, back pain, abdominal pain, nausea 

vomiting, numbness or tingling, dysuria or hematuria, constipation or diarrhea, 

headaches or visual changes, or any other current symptoms. (Rosalia Caraballo)





- Related Data


 Home Medications











 Medication  Instructions  Recorded  Confirmed


 


Lisinopril-Hctz 20-25 mg 1 tab PO DAILY 08/12/15 07/21/17





[Zestoretic 20-25]   


 


Estrogens, Conjugated [Premarin] 0.3 mg PO DAILY 06/09/16 07/21/17


 


Venlafaxine HCl [Effexor XR] 225 mg PO DAILY 09/28/16 07/21/17


 


Baclofen 10 - 20 mg PO HS PRN 02/03/17 07/21/17


 


Fluticasone Nasal Spray [Flonase 2 spray EA NOSTRIL BID 02/03/17 07/21/17





Nasal Spray]   


 


Levothyroxine Sodium [Synthroid] 50 mcg PO DAILY 02/03/17 07/21/17


 


Pantoprazole [Protonix] 40 mg PO DAILY 02/03/17 07/21/17


 


Furosemide [Lasix] 40 mg PO DAILY 03/14/17 07/21/17


 


Dextroamphetamine/Amphetamine 20 mg PO TID 03/22/17 07/21/17





[Adderall]   


 


HYDROcodone/APAP 10-325MG [Norco 1 tab PO Q4H PRN 03/22/17 07/21/17





10325]   


 


Multivitamins, Thera [Multivitamin 1 tab PO DAILY@1200 03/22/17 07/21/17





(formulary)]   








 Previous Rx's











 Medication  Instructions  Recorded


 


Pregabalin [Lyrica] 150 mg PO Q8H #90 capsule 01/27/16


 


Meloxicam [Mobic] 15 mg PO DAILY #30 tab 03/04/17











 Allergies











Allergy/AdvReac Type Severity Reaction Status Date / Time


 


No Known Allergies Allergy   Verified 07/21/17 02:42














Review of Systems


ROS Other: All systems not noted in ROS Statement are negative.





<Rosalia Caraballo - Last Filed: 07/21/17 04:22>


ROS Other: All systems not noted in ROS Statement are negative.





<Eder Spivey - Last Filed: 07/21/17 04:55>


ROS Statement: 


Those systems with pertinent positive or pertinent negative responses have been 

documented in the HPI.








Past Medical History


Past Medical History: Eye Disorder, Fibromyalgia, Hypertension, Pneumonia, 

Thyroid Disorder


Additional Past Medical History / Comment(s): Jac's Syndrome R eye, 

HYPOGLYCEMIA, ANEMIA, MIGRAINES, pituitary tumor-no active chemo or radiation


History of Any Multi-Drug Resistant Organisms: MRSA


Date of last positivie culture/infection: 03/22/2017


MDRO Source:: right armpit


Past Surgical History: Bariatric Surgery, Cholecystectomy, Hernia Repair, 

Hysterectomy, Orthopedic Surgery


Additional Past Surgical History / Comment(s): tummy tuck; foot surgery ( right 

side took out extra toe ), left foot surgery, TRACHEOSTOMY/ AND REMOVED


Past Anesthesia/Blood Transfusion Reactions: No Reported Reaction


Past Psychological History: ADD/ADHD, Anxiety, Depression


Smoking Status: Former smoker


Past Alcohol Use History: None Reported


Past Drug Use History: None Reported





- Past Family History


  ** Mother


Family Medical History: Coronary Artery Disease (CAD)





  ** Father


Family Medical History: Cancer





<Rosalia Caraballo - Last Filed: 07/21/17 04:22>





General Exam


Limitations: no limitations





<Rosalia Caraballo - Last Filed: 07/21/17 04:22>





<Eder Spivey - Last Filed: 07/21/17 04:55>





- General Exam Comments


Initial Comments: 





General:  The patient is awake and alert, in no distress, and does not appear 

acutely ill.   


Neck:  The neck is supple, there is no tenderness.


Cardiovascular:  There is a regular rate and rhythm. No murmur, rub or gallop 

is appreciated.


Respiratory:  Lungs are clear to auscultation, respirations are non-labored, 

breath sounds are equal.  No wheezes, stridor, rales, or rhonchi.


Musculoskeletal:sensation intact with 2+ pulses.  Right lower extremity.  

Patient does have an obvious deformed right ankle.  No tenderness at the 

proximal tib-fib.  Unable to assess range of motion of foot or knee due to pain 

ankle site.


Neurological:  CN II-XII intact, There are no obvious motor or sensory 

deficits. Coordination appears grossly intact. Speech is normal.


Skin:  Skin is warm and dry and no rashes or lesions are noted. 


Psychiatric:  Normal mood and affect.   (Rosalia Caraballo)





Course





<Rosalia Caraballo - Last Filed: 07/21/17 04:22>





<Eder Spivey - Last Filed: 07/21/17 04:55>


 Vital Signs











  07/21/17 07/21/17 07/21/17





  02:39 03:25 04:03


 


Temperature 97.6 F  


 


Pulse Rate 73 74 73


 


Respiratory 18 20 20





Rate   


 


Blood Pressure 162/79  124/71


 


O2 Sat by Pulse 96 100 99





Oximetry   














- Reevaluation(s)


Reevaluation #1: 





07/21/17 04:22


This case will be signed out to DR. Spivey. (Rosalia Caraballo)





Procedures





- Orthopedic Fracture Reduction


  ** Fracture #1


Side: right


Fracture Reduction Location: tibia, fibula


Analgesia: none


Technique: direct manipulation


Post Reduction X-rays Demonstrate: acceptable reduction


Post-Reduction Neuro Exam: intact


Post-Reduction Vascular Exam: intact


Splint Applied: Yes


Patient Tolerated Procedure: well





<Rosalia Caraballo - Last Filed: 07/21/17 04:22>





Medical Decision Making





- Lab Data


Result diagrams: 


 07/21/17 03:40








<Rosalia Caraballo - Last Filed: 07/21/17 04:22>





- Lab Data


Result diagrams: 


 07/21/17 03:40





 07/21/17 03:40





<Eder Spivey - Last Filed: 07/21/17 04:55>





- Medical Decision Making





52-year-old female presents emergency Department chief complaint of right ankle 

injury.at this time patient ankle was reduced.  Patient vital signs are stable 

however she is lethargic and responsive in the emergency department at this 

time.  Patient does have stable vital signs patient is begun on her own.  

Patient's glucose is 105 patient did not respond initially to Narcan we did 

increase the dose. (Rosalia Caraballo)





EKG shows normal sinus rhythm at 73 bpm UT interval is 156 QRS is under 10 Q-T 

intervals 4-42 QTC is 46.  Patient's EKG shows no ST segment elevation or 

depression. 





Patient is arousable with sternal rub however she denies having taken anything 

but immediately falls back to sleep as soon as I stop talking to her.  Patient'

s renal function is significantly changed since her last stay and I have no 

cause at this time for acute renal failure. (Eder Spivey)





- Lab Data


 Lab Results











  07/21/17 07/21/17 07/21/17 Range/Units





  03:40 03:40 03:40 


 


WBC  11.3 H    (3.8-10.6)  k/uL


 


RBC  3.87    (3.80-5.40)  m/uL


 


Hgb  12.5    (11.4-16.0)  gm/dL


 


Hct  37.1    (34.0-46.0)  %


 


MCV  95.9    (80.0-100.0)  fL


 


MCH  32.2    (25.0-35.0)  pg


 


MCHC  33.6    (31.0-37.0)  g/dL


 


RDW  15.6 H    (11.5-15.5)  %


 


Plt Count  327    (150-450)  k/uL


 


Neutrophils %  79    %


 


Lymphocytes %  13    %


 


Monocytes %  5    %


 


Eosinophils %  0    %


 


Basophils %  0    %


 


Neutrophils #  9.0 H    (1.3-7.7)  k/uL


 


Lymphocytes #  1.4    (1.0-4.8)  k/uL


 


Monocytes #  0.6    (0-1.0)  k/uL


 


Eosinophils #  0.0    (0-0.7)  k/uL


 


Basophils #  0.0    (0-0.2)  k/uL


 


Sodium   140   (137-145)  mmol/L


 


Potassium   3.2 L   (3.5-5.1)  mmol/L


 


Chloride   107   ()  mmol/L


 


Carbon Dioxide   18 L   (22-30)  mmol/L


 


Anion Gap   15   mmol/L


 


BUN   62 H   (7-17)  mg/dL


 


Creatinine   3.00 H   (0.52-1.04)  mg/dL


 


Est GFR (MDRD) Af Amer   20   (>60 ml/min/1.73 sqM)  


 


Est GFR (MDRD) Non-Af   17   (>60 ml/min/1.73 sqM)  


 


Glucose   96   (74-99)  mg/dL


 


POC Glucose (mg/dL)     (75-99)  mg/dL


 


POC Glu Operater ID     


 


Calcium   9.0   (8.4-10.2)  mg/dL


 


Total Bilirubin   0.5   (0.2-1.3)  mg/dL


 


AST   54 H   (14-36)  U/L


 


ALT   64 H   (9-52)  U/L


 


Alkaline Phosphatase   94   ()  U/L


 


Total Creatine Kinase     ()  U/L


 


Total Protein   6.6   (6.3-8.2)  g/dL


 


Albumin   4.1   (3.5-5.0)  g/dL


 


Urine Color     


 


Urine Appearance     (Clear)  


 


Urine pH     (5.0-8.0)  


 


Ur Specific Gravity     (1.001-1.035)  


 


Urine Protein     (Negative)  


 


Urine Glucose (UA)     (Negative)  


 


Urine Ketones     (Negative)  


 


Urine Blood     (Negative)  


 


Urine Nitrite     (Negative)  


 


Urine Bilirubin     (Negative)  


 


Urine Urobilinogen     (<2.0)  mg/dL


 


Ur Leukocyte Esterase     (Negative)  


 


Urine RBC     (0-5)  /hpf


 


Hyaline Casts     (0-2)  /lpf


 


Urine Mucus     (None)  /hpf


 


Salicylates     mg/dL


 


Urine Opiates Screen    Detected H  (NotDetected)  


 


Ur Oxycodone Screen    Not Detected  (NotDetected)  


 


Urine Methadone Screen    Not Detected  (NotDetected)  


 


Ur Propoxyphene Screen    Not Detected  (NotDetected)  


 


Acetaminophen     ug/mL


 


Ur Barbiturates Screen    Not Detected  (NotDetected)  


 


U Tricyclic Antidepress    Not Detected  (NotDetected)  


 


Ur Phencyclidine Scrn    Not Detected  (NotDetected)  


 


Ur Amphetamines Screen    Detected H  (NotDetected)  


 


U Methamphetamines Scrn    Not Detected  (NotDetected)  


 


U Benzodiazepines Scrn    Not Detected  (NotDetected)  


 


Urine Cocaine Screen    Not Detected  (NotDetected)  


 


U Marijuana (THC) Screen    Not Detected  (NotDetected)  


 


Serum Alcohol   <10   mg/dL














  07/21/17 07/21/17 07/21/17 Range/Units





  03:40 03:40 03:40 


 


WBC     (3.8-10.6)  k/uL


 


RBC     (3.80-5.40)  m/uL


 


Hgb     (11.4-16.0)  gm/dL


 


Hct     (34.0-46.0)  %


 


MCV     (80.0-100.0)  fL


 


MCH     (25.0-35.0)  pg


 


MCHC     (31.0-37.0)  g/dL


 


RDW     (11.5-15.5)  %


 


Plt Count     (150-450)  k/uL


 


Neutrophils %     %


 


Lymphocytes %     %


 


Monocytes %     %


 


Eosinophils %     %


 


Basophils %     %


 


Neutrophils #     (1.3-7.7)  k/uL


 


Lymphocytes #     (1.0-4.8)  k/uL


 


Monocytes #     (0-1.0)  k/uL


 


Eosinophils #     (0-0.7)  k/uL


 


Basophils #     (0-0.2)  k/uL


 


Sodium     (137-145)  mmol/L


 


Potassium     (3.5-5.1)  mmol/L


 


Chloride     ()  mmol/L


 


Carbon Dioxide     (22-30)  mmol/L


 


Anion Gap     mmol/L


 


BUN     (7-17)  mg/dL


 


Creatinine     (0.52-1.04)  mg/dL


 


Est GFR (MDRD) Af Amer     (>60 ml/min/1.73 sqM)  


 


Est GFR (MDRD) Non-Af     (>60 ml/min/1.73 sqM)  


 


Glucose     (74-99)  mg/dL


 


POC Glucose (mg/dL)     (75-99)  mg/dL


 


POC Glu Operater ID     


 


Calcium     (8.4-10.2)  mg/dL


 


Total Bilirubin     (0.2-1.3)  mg/dL


 


AST     (14-36)  U/L


 


ALT     (9-52)  U/L


 


Alkaline Phosphatase     ()  U/L


 


Total Creatine Kinase    871 H  ()  U/L


 


Total Protein     (6.3-8.2)  g/dL


 


Albumin     (3.5-5.0)  g/dL


 


Urine Color  Light Yellow    


 


Urine Appearance  Clear    (Clear)  


 


Urine pH  6.0    (5.0-8.0)  


 


Ur Specific Gravity  1.009    (1.001-1.035)  


 


Urine Protein  Trace H    (Negative)  


 


Urine Glucose (UA)  Negative    (Negative)  


 


Urine Ketones  Negative    (Negative)  


 


Urine Blood  Small H    (Negative)  


 


Urine Nitrite  Negative    (Negative)  


 


Urine Bilirubin  Negative    (Negative)  


 


Urine Urobilinogen  <2.0    (<2.0)  mg/dL


 


Ur Leukocyte Esterase  Negative    (Negative)  


 


Urine RBC  <1    (0-5)  /hpf


 


Hyaline Casts  5 H    (0-2)  /lpf


 


Urine Mucus  Rare H    (None)  /hpf


 


Salicylates   <1.0   mg/dL


 


Urine Opiates Screen     (NotDetected)  


 


Ur Oxycodone Screen     (NotDetected)  


 


Urine Methadone Screen     (NotDetected)  


 


Ur Propoxyphene Screen     (NotDetected)  


 


Acetaminophen   <10.0   ug/mL


 


Ur Barbiturates Screen     (NotDetected)  


 


U Tricyclic Antidepress     (NotDetected)  


 


Ur Phencyclidine Scrn     (NotDetected)  


 


Ur Amphetamines Screen     (NotDetected)  


 


U Methamphetamines Scrn     (NotDetected)  


 


U Benzodiazepines Scrn     (NotDetected)  


 


Urine Cocaine Screen     (NotDetected)  


 


U Marijuana (THC) Screen     (NotDetected)  


 


Serum Alcohol     mg/dL














  07/21/17 Range/Units





  03:57 


 


WBC   (3.8-10.6)  k/uL


 


RBC   (3.80-5.40)  m/uL


 


Hgb   (11.4-16.0)  gm/dL


 


Hct   (34.0-46.0)  %


 


MCV   (80.0-100.0)  fL


 


MCH   (25.0-35.0)  pg


 


MCHC   (31.0-37.0)  g/dL


 


RDW   (11.5-15.5)  %


 


Plt Count   (150-450)  k/uL


 


Neutrophils %   %


 


Lymphocytes %   %


 


Monocytes %   %


 


Eosinophils %   %


 


Basophils %   %


 


Neutrophils #   (1.3-7.7)  k/uL


 


Lymphocytes #   (1.0-4.8)  k/uL


 


Monocytes #   (0-1.0)  k/uL


 


Eosinophils #   (0-0.7)  k/uL


 


Basophils #   (0-0.2)  k/uL


 


Sodium   (137-145)  mmol/L


 


Potassium   (3.5-5.1)  mmol/L


 


Chloride   ()  mmol/L


 


Carbon Dioxide   (22-30)  mmol/L


 


Anion Gap   mmol/L


 


BUN   (7-17)  mg/dL


 


Creatinine   (0.52-1.04)  mg/dL


 


Est GFR (MDRD) Af Amer   (>60 ml/min/1.73 sqM)  


 


Est GFR (MDRD) Non-Af   (>60 ml/min/1.73 sqM)  


 


Glucose   (74-99)  mg/dL


 


POC Glucose (mg/dL)  105 H  (75-99)  mg/dL


 


POC Glu Operater ID  Apryl Blackubrn A  


 


Calcium   (8.4-10.2)  mg/dL


 


Total Bilirubin   (0.2-1.3)  mg/dL


 


AST   (14-36)  U/L


 


ALT   (9-52)  U/L


 


Alkaline Phosphatase   ()  U/L


 


Total Creatine Kinase   ()  U/L


 


Total Protein   (6.3-8.2)  g/dL


 


Albumin   (3.5-5.0)  g/dL


 


Urine Color   


 


Urine Appearance   (Clear)  


 


Urine pH   (5.0-8.0)  


 


Ur Specific Gravity   (1.001-1.035)  


 


Urine Protein   (Negative)  


 


Urine Glucose (UA)   (Negative)  


 


Urine Ketones   (Negative)  


 


Urine Blood   (Negative)  


 


Urine Nitrite   (Negative)  


 


Urine Bilirubin   (Negative)  


 


Urine Urobilinogen   (<2.0)  mg/dL


 


Ur Leukocyte Esterase   (Negative)  


 


Urine RBC   (0-5)  /hpf


 


Hyaline Casts   (0-2)  /lpf


 


Urine Mucus   (None)  /hpf


 


Salicylates   mg/dL


 


Urine Opiates Screen   (NotDetected)  


 


Ur Oxycodone Screen   (NotDetected)  


 


Urine Methadone Screen   (NotDetected)  


 


Ur Propoxyphene Screen   (NotDetected)  


 


Acetaminophen   ug/mL


 


Ur Barbiturates Screen   (NotDetected)  


 


U Tricyclic Antidepress   (NotDetected)  


 


Ur Phencyclidine Scrn   (NotDetected)  


 


Ur Amphetamines Screen   (NotDetected)  


 


U Methamphetamines Scrn   (NotDetected)  


 


U Benzodiazepines Scrn   (NotDetected)  


 


Urine Cocaine Screen   (NotDetected)  


 


U Marijuana (THC) Screen   (NotDetected)  


 


Serum Alcohol   mg/dL














Disposition





<Rosalia Caraballo - Last Filed: 07/21/17 04:22>


Time of Disposition: 04:54





<Eder Spivey - Last Filed: 07/21/17 04:55>


Clinical Impression: 


 Fracture dislocation of ankle, Decreased responsiveness, Acute renal failure





Disposition: ADMITTED AS IP TO THIS HOSP


Referrals: 


Chris Carcamo DO [Primary Care Provider] - 1-2 days

## 2017-07-21 NOTE — P.[facility]
History of Present Illness





- Reason for Consult


acute renal failure





- History of Present Illness





Reason for consultation: Acute kidney injury





History of present illness:


Patient is a 52-year-old female seen in renal consultation for acute kidney 

injury.  Her baseline creatinine is 1 and is elevated at 3 today.  Patient 

presented due to a fall.  Patient is currently very lethargic and is responding 

very minimally to verbal commands.  Patient states she sustained a fall as her 

legs gave out.  She is noted to have a fracture in her right ankle.  She denies 

hitting her head.  Patient denies any vomiting or diarrhea.  States oral intake 

has been relatively fair.  I do note that she was taking lisinopril and 

hydrochlorothiazide at home along with Lasix as well.  She also admits to 

taking NSAIDs on a daily basis for pain control.  Admits to good urine output.  

Denies any hematuria or dysuria.  Denies chest pain or shortness of breath.  

She does have multiple red nodules all of her body which she states is due to 

picking.





Vital signs are stable.


General: The patient appeared well nourished and normally developed. 


HEENT: Head exam is unremarkable. Neck is without jugular venous distension.


LUNGS: Lungs are clear to auscultation and percussion. Breath sounds decreased.


HEART: Rate and Rhythm are regular. First and second heart sounds normal. No 

murmurs, rubs or gallops. 


ABDOMEN: Abdominal exam reveals normal bowel sounds. Non-tender and non-

distended. No evidence of peritonitis.


EXTREMITITES: No clubbing, cyanosis, or edema.





Past Medical History


Past Medical History: Eye Disorder, Fibromyalgia, Hypertension, Pneumonia, 

Thyroid Disorder


Additional Past Medical History / Comment(s): Jac's Syndrome R eye, 

HYPOGLYCEMIA, ANEMIA, MIGRAINES, pituitary tumor-no active chemo or radiation


History of Any Multi-Drug Resistant Organisms: MRSA


Date of last positivie culture/infection: 03/22/2017


MDRO Source:: right armpit


Past Surgical History: Bariatric Surgery, Cholecystectomy, Hernia Repair, 

Hysterectomy, Orthopedic Surgery


Additional Past Surgical History / Comment(s): tummy tuck; foot surgery ( right 

side took out extra toe ), left foot surgery, TRACHEOSTOMY/ AND REMOVED


Past Anesthesia/Blood Transfusion Reactions: No Reported Reaction


Past Psychological History: ADD/ADHD, Anxiety, Depression


Smoking Status: Former smoker


Past Alcohol Use History: None Reported


Past Drug Use History: None Reported





- Past Family History


  ** Mother


Family Medical History: Coronary Artery Disease (CAD)





  ** Father


Family Medical History: Cancer





Medications and Allergies


 Home Medications











 Medication  Instructions  Recorded  Confirmed  Type


 


Lisinopril-Hctz 20-25 mg 1 tab PO DAILY 08/12/15 07/21/17 History





[Zestoretic 20-25]    


 


Estrogens, Conjugated [Premarin] 0.3 mg PO DAILY 06/09/16 07/21/17 History


 


Venlafaxine HCl [Effexor XR] 225 mg PO DAILY 09/28/16 07/21/17 History


 


Baclofen 10 - 20 mg PO HS PRN 02/03/17 07/21/17 History


 


Fluticasone Nasal Spray [Flonase 2 spray EA NOSTRIL BID 02/03/17 07/21/17 

History





Nasal Spray]    


 


Levothyroxine Sodium [Synthroid] 50 mcg PO DAILY 02/03/17 07/21/17 History


 


Pantoprazole [Protonix] 40 mg PO DAILY 02/03/17 07/21/17 History


 


Furosemide [Lasix] 40 mg PO DAILY 03/14/17 07/21/17 History


 


Dextroamphetamine/Amphetamine 20 mg PO TID 03/22/17 07/21/17 History





[Adderall]    


 


HYDROcodone/APAP 10-325MG [Norco 1 tab PO Q4H PRN 03/22/17 07/21/17 History





]    


 


Multivitamins, Thera [Multivitamin 1 tab PO DAILY@1200 03/22/17 07/21/17 History





(formulary)]    


 


Cyanocobalamin [Vitamin B-12 1,000 mcg SQ QMONTH 07/21/17 07/21/17 History





Injection]    


 


Ibuprofen [Motrin] 800 mg PO TID PRN 07/21/17 07/21/17 History


 


Potassium Chloride ER [K-Dur 20] 20 meq PO DAILY 07/21/17 07/21/17 History


 


busPIRone HCl [Buspar] 5 mg PO BID 07/21/17 07/21/17 History











 Allergies











Allergy/AdvReac Type Severity Reaction Status Date / Time


 


No Known Allergies Allergy   Verified 07/21/17 08:33














Physical Exam


Vitals: 


 Vital Signs











  Temp Pulse Pulse Resp BP BP Pulse Ox


 


 07/21/17 07:00  97.4 F L   82  17   135/80  100


 


 07/21/17 06:08  97.5 F L   83  16   125/76  100


 


 07/21/17 05:00   72   22  140/81   96


 


 07/21/17 04:03   73   20  124/71   99


 


 07/21/17 03:25   74   20    100


 


 07/21/17 02:39  97.6 F  73   18  162/79   96








 Intake and Output











 07/20/17 07/21/17 07/21/17





 22:59 06:59 14:59


 


Other:   


 


  Weight  86.183 kg 














Results





- Lab Results


 Most recent lab results











Calcium  9.0 mg/dL (8.4-10.2)   07/21/17  03:40    














 07/21/17 03:40





 07/21/17 03:40





Assessment and Plan


Plan: 





Assessment:


#1.  Nonoliguric acute kidney injury mostly prerenal from diuretics and NSAIDs.

  Creatinine at 3 today.  Urinalysis noted to be quite benign.


#2.  Status post fall with right ankle fracture noted.


#3.  Hypokalemia secondary to diuretics.  Rule out magnesium deficiency.


#4.  History of hypertension.  Currently controlled.


#5.  Metabolic acidosis secondary to acute kidney injury.





Plan:


Continue normal saline to be run at 100 mL an hour.


Replace potassium.  40 mEq once today.  


Check magnesium level.


Start oral sodium bicarbonate supplementation.


Avoid nephrotoxic agents and hypotensive episodes.  Diuretics as well as NSAIDs 

held for now.


Repeat electrolytes in the morning.





Thank you for the consultation.  I will continue to follow the patient with you 

during her hospital stay.

## 2017-07-22 LAB
ANION GAP SERPL CALC-SCNC: 6 MMOL/L
APTT BLD: 21.3 SEC (ref 22–30)
BASOPHILS # BLD AUTO: 0 K/UL (ref 0–0.2)
BASOPHILS NFR BLD AUTO: 0 %
BUN SERPL-SCNC: 25 MG/DL (ref 7–17)
CALCIUM SPEC-MCNC: 9.3 MG/DL (ref 8.4–10.2)
CH: 31.3
CHCM: 32.6
CHLORIDE SERPL-SCNC: 113 MMOL/L (ref 98–107)
CO2 SERPL-SCNC: 24 MMOL/L (ref 22–30)
EOSINOPHIL # BLD AUTO: 0.1 K/UL (ref 0–0.7)
EOSINOPHIL NFR BLD AUTO: 1 %
ERYTHROCYTE [DISTWIDTH] IN BLOOD BY AUTOMATED COUNT: 3.47 M/UL (ref 3.8–5.4)
ERYTHROCYTE [DISTWIDTH] IN BLOOD: 15.5 % (ref 11.5–15.5)
GLUCOSE SERPL-MCNC: 110 MG/DL (ref 74–99)
HCT VFR BLD AUTO: 33.6 % (ref 34–46)
HDW: 2.66
HGB BLD-MCNC: 11.3 GM/DL (ref 11.4–16)
INR PPP: 0.9 (ref ?–1.2)
LUC NFR BLD AUTO: 2 %
LYMPHOCYTES # SPEC AUTO: 2.3 K/UL (ref 1–4.8)
LYMPHOCYTES NFR SPEC AUTO: 31 %
MAGNESIUM SPEC-SCNC: 2.5 MG/DL (ref 1.6–2.3)
MCH RBC QN AUTO: 32.5 PG (ref 25–35)
MCHC RBC AUTO-ENTMCNC: 33.6 G/DL (ref 31–37)
MCV RBC AUTO: 96.7 FL (ref 80–100)
MONOCYTES # BLD AUTO: 0.4 K/UL (ref 0–1)
MONOCYTES NFR BLD AUTO: 5 %
NEUTROPHILS # BLD AUTO: 4.5 K/UL (ref 1.3–7.7)
NEUTROPHILS NFR BLD AUTO: 61 %
NON-AFRICAN AMERICAN GFR(MDRD): >60
POTASSIUM SERPL-SCNC: 3.7 MMOL/L (ref 3.5–5.1)
PT BLD: 9.5 SEC (ref 9–12)
SODIUM SERPL-SCNC: 143 MMOL/L (ref 137–145)
WBC # BLD AUTO: 0.18 10*3/UL
WBC # BLD AUTO: 7.5 K/UL (ref 3.8–10.6)
WBC (PEROX): 7.82

## 2017-07-22 RX ADMIN — PREGABALIN SCH MG: 50 CAPSULE ORAL at 23:56

## 2017-07-22 RX ADMIN — FLUTICASONE PROPIONATE SCH SPRAY: 50 SPRAY, METERED NASAL at 19:55

## 2017-07-22 RX ADMIN — HYDROCODONE BITARTRATE AND ACETAMINOPHEN PRN EACH: 10; 325 TABLET ORAL at 04:10

## 2017-07-22 RX ADMIN — VENLAFAXINE HYDROCHLORIDE SCH MG: 75 CAPSULE, EXTENDED RELEASE ORAL at 08:52

## 2017-07-22 RX ADMIN — PREGABALIN SCH MG: 50 CAPSULE ORAL at 16:41

## 2017-07-22 RX ADMIN — FLUTICASONE PROPIONATE SCH SPRAY: 50 SPRAY, METERED NASAL at 08:52

## 2017-07-22 RX ADMIN — ESTROGENS, CONJUGATED SCH MG: 0.3 TABLET, FILM COATED ORAL at 08:54

## 2017-07-22 RX ADMIN — CEFAZOLIN SCH MLS/HR: 330 INJECTION, POWDER, FOR SOLUTION INTRAMUSCULAR; INTRAVENOUS at 08:52

## 2017-07-22 RX ADMIN — PANTOPRAZOLE SODIUM SCH MG: 40 TABLET, DELAYED RELEASE ORAL at 08:53

## 2017-07-22 RX ADMIN — Medication SCH MG: at 08:50

## 2017-07-22 RX ADMIN — HYDROCODONE BITARTRATE AND ACETAMINOPHEN PRN EACH: 10; 325 TABLET ORAL at 19:54

## 2017-07-22 RX ADMIN — HEPARIN SODIUM SCH UNIT: 5000 INJECTION, SOLUTION INTRAVENOUS; SUBCUTANEOUS at 08:51

## 2017-07-22 RX ADMIN — HYDROCODONE BITARTRATE AND ACETAMINOPHEN PRN EACH: 10; 325 TABLET ORAL at 08:51

## 2017-07-22 RX ADMIN — PREGABALIN SCH MG: 50 CAPSULE ORAL at 08:59

## 2017-07-22 RX ADMIN — HYDROCODONE BITARTRATE AND ACETAMINOPHEN PRN EACH: 10; 325 TABLET ORAL at 13:46

## 2017-07-22 RX ADMIN — HEPARIN SODIUM SCH UNIT: 5000 INJECTION, SOLUTION INTRAVENOUS; SUBCUTANEOUS at 19:55

## 2017-07-22 RX ADMIN — THERA TABS SCH EACH: TAB at 12:43

## 2017-07-22 RX ADMIN — LEVOTHYROXINE SODIUM SCH MCG: 50 TABLET ORAL at 05:40

## 2017-07-22 NOTE — PN
Patient is seen for followup for acute kidney injury. Her renal function has 
improved, with serum creatinine down to 0.8 from 3.0 mg/dL. Patient has been 
hydrated. She is scheduled for surgery on her right foot. Patient has a right 
bimalleolar ankle fracture with dislocation. On examination, she is currently 
comfortable, awake, not in any acute distress. Blood pressure is 139/80, heart 
rate 99 per minute. She is afebrile.

EXAMINATION OF THE HEART: S1, S2.

EXAMINATION OF LUNGS: Bilateral breath sounds are heard.

Abdomen is soft, non-tender.

Examination of lower extremities shows right extremity currently wrapped. No 
edema is noted in the left lower extremity. 

There is rash noted all over, mainly on the face, with crusted lesions noted at 
this time. 

CNS exam is grossly intact. 



Labs show sodium 143, potassium 3.7, chloride 113, BUN 25, serum creatinine 0.8
, hemoglobin 11.3 grams/dL.



ASSESSMENT:

1. Acute kidney injury, prerenal, currently significantly improved. Will 
continue IV fluids but decrease rate to about 50 mL/hour. Continue to encourage 
increased oral intake.

2. Right bimalleolar fracture, scheduled for surgery possibly today. 

3. Rash noted over face and extremities. Appears to be allergic in nature; 
possibly infectious as well. Patient has history of MRSA. I am not sure if 
these lesions are related to MRSA infection. Currently not maintained on any 
antibiotics. 

4. Metabolic acidosis, improved. Maintained on sodium bicarb. Will discontinue 
the oral sodium bicarb. 



PLAN: Discontinue sodium bicarb. Decrease IV fluids to 50 mL/hour. Repeat labs 
in a.m. Encourage increased oral intake.  
MTDD

## 2017-07-22 NOTE — P.PN
Subjective


Principal diagnosis: 





Right ankle bimalleolar fracture





Dr. Olvera is present today to examine the patient.  Patient's mental status has 

improved significantly since yesterday.  She is awake and alert upon entry to 

the room, she answers all her questions accurately.  She has pain in the ankle 

with motion, the posterior splint remains intact.  She denies any headaches, 

lightheadedness or chest pain.





Objective





- Vital Signs


Vital signs: 


 Vital Signs











Temp  98.3 F   07/22/17 07:00


 


Pulse  99   07/22/17 07:00


 


Resp  18   07/22/17 07:00


 


BP  139/80   07/22/17 07:00


 


Pulse Ox  98   07/22/17 07:00








 Intake & Output











 07/21/17 07/22/17 07/22/17





 18:59 06:59 18:59


 


Intake Total 240 300 


 


Balance 240 300 


 


Weight 86.183 kg  


 


Intake:   


 


  Oral 240 300 


 


Other:   


 


  Voiding Method  Diaper 


 


  # Voids 3 1 














- Exam





Right lower extremity:


Posterior splint remains intact, sensation to light touch both proximally and 

distally to the splinter intact.  Cap refill remains less than 3 seconds.





- Labs


CBC & Chem 7: 


 07/22/17 07:17





 07/22/17 07:17


Labs: 


 Abnormal Lab Results - Last 24 Hours (Table)











  07/21/17 07/21/17 07/22/17 Range/Units





  11:21 11:21 07:17 


 


WBC  10.8 H    (3.8-10.6)  k/uL


 


RBC  3.70 L    (3.80-5.40)  m/uL


 


Hgb     (11.4-16.0)  gm/dL


 


Hct     (34.0-46.0)  %


 


RDW  15.8 H    (11.5-15.5)  %


 


APTT     (22.0-30.0)  sec


 


Chloride   116 H  113 H  ()  mmol/L


 


Carbon Dioxide   19 L   (22-30)  mmol/L


 


BUN   44 H  25 H  (7-17)  mg/dL


 


Creatinine   1.67 H   (0.52-1.04)  mg/dL


 


Glucose   125 H  110 H  (74-99)  mg/dL


 


Magnesium    2.5 H  (1.6-2.3)  mg/dL














  07/22/17 07/22/17 Range/Units





  07:17 07:17 


 


WBC    (3.8-10.6)  k/uL


 


RBC   3.47 L  (3.80-5.40)  m/uL


 


Hgb   11.3 L  (11.4-16.0)  gm/dL


 


Hct   33.6 L  (34.0-46.0)  %


 


RDW    (11.5-15.5)  %


 


APTT  21.3 L   (22.0-30.0)  sec


 


Chloride    ()  mmol/L


 


Carbon Dioxide    (22-30)  mmol/L


 


BUN    (7-17)  mg/dL


 


Creatinine    (0.52-1.04)  mg/dL


 


Glucose    (74-99)  mg/dL


 


Magnesium    (1.6-2.3)  mg/dL








 Microbiology - Last 24 Hours (Table)











 07/21/17 03:40 Urine Culture - Preliminary





 Urine,Catheterized 














Assessment and Plan


Plan: 





Assessment:


1.  Right bimalleolar ankle fracture with dislocation, status post closed 

reduction by emergency room staff


2.  Status post fall from standing


3.  Acute kidney injury


4.  Other medical comorbidities





Plan:


1.  Patient's overall mental status and lab work has continued to improve 

through the night.  We will plan for surgery tomorrow morning 07/23/2017.  

Procedure was discussed with the patient today, we will obtain consent.


2.  Nonweightbearing right lower extremity, continues posterior splint


3.  Obtain consent 


4.  Ice and elevate


5.  Other medical specialty recommendations


6.  GI and DVT prophylaxis, we'll hold subcu medication at midnight.


7.  Further recommendations to follow after surgery


Time with Patient: Less than 30

## 2017-07-22 NOTE — P.HPIM
History of Present Illness





Patient is a pleasant 52-year-old female was admitted secondary to an feel 

episodes fall and right ankle fracture.  Her confusional episodes were 

considered secondary to excessive narcotic use.  As well as severe metabolic 

encephalopathy from renal failure.  Her renal failure is secondary seconded 

secondary to excessive diuretic therapy which is being held and patient was 

given IV fluids with significant improvement in creatinine which came down from 

3 to 0.88.  Patient is alert awake oriented 3 now.  Patient was a evaluated by 

orthopedic surgery for right ankle fracture.  Patient is going for surgery.  

Patient has diffuse lesions on the body consistent with excessive scratching 

and picking none of them appear to be infected.





Review of Systems





REVIEW OF SYSTEMS: 


CONSTITUTIONAL: No fever, no malaise, no fatigue. 


HEENT: No recent visual problems or hearing problems. Denied any sore throat. 


CARDIOVASCULAR: No chest pain, orthopnea, PND, no palpitations, no syncope. 


PULMONARY: No shortness of breath, no cough, no hemoptysis. 


GASTROINTESTINAL: No diarrhea, no nausea, no vomiting, no abdominal pain. 

Normoactive bowel sounds. 


NEUROLOGICAL: No headaches, no weakness, no numbness. 


HEMATOLOGICAL: Denies any bleeding or petechiae. 


GENITOURINARY: Denies any burning micturition, frequency, or urgency. 


MUSCULOSKELETAL/RHEUMATOLOGICAL: Some pain in the right ankle which is well 

controlled with present and analgesia


ENDOCRINE: Denies any polyuria or polydipsia. 





The rest of the 14-point review of systems is negative.








Past Medical History


Past Medical History: Eye Disorder, Fibromyalgia, Hypertension, Pneumonia, 

Thyroid Disorder


Additional Past Medical History / Comment(s): Jac's Syndrome R eye, 

HYPOGLYCEMIA, ANEMIA, MIGRAINES, pituitary tumor-no active chemo or radiation


History of Any Multi-Drug Resistant Organisms: MRSA


Date of last positivie culture/infection: 03/22/2017


MDRO Source:: right armpit


Past Surgical History: Bariatric Surgery, Cholecystectomy, Hernia Repair, 

Hysterectomy, Orthopedic Surgery


Additional Past Surgical History / Comment(s): tummy tuck; foot surgery ( right 

side took out extra toe ), left foot surgery, TRACHEOSTOMY/ AND REMOVED


Past Anesthesia/Blood Transfusion Reactions: No Reported Reaction


Smoking Status: Former smoker





- Past Family History


  ** Mother


Family Medical History: Coronary Artery Disease (CAD)





  ** Father


Family Medical History: Cancer





Medications and Allergies


 Home Medications











 Medication  Instructions  Recorded  Confirmed  Type


 


Lisinopril-Hctz 20-25 mg 1 tab PO DAILY 08/12/15 07/21/17 History





[Zestoretic 20-25]    


 


Estrogens, Conjugated [Premarin] 0.3 mg PO DAILY 06/09/16 07/21/17 History


 


Venlafaxine HCl [Effexor XR] 225 mg PO DAILY 09/28/16 07/21/17 History


 


Baclofen 10 - 20 mg PO HS PRN 02/03/17 07/21/17 History


 


Fluticasone Nasal Spray [Flonase 2 spray EA NOSTRIL BID 02/03/17 07/21/17 

History





Nasal Spray]    


 


Levothyroxine Sodium [Synthroid] 50 mcg PO DAILY 02/03/17 07/21/17 History


 


Pantoprazole [Protonix] 40 mg PO DAILY 02/03/17 07/21/17 History


 


Furosemide [Lasix] 40 mg PO DAILY 03/14/17 07/21/17 History


 


Dextroamphetamine/Amphetamine 20 mg PO TID 03/22/17 07/21/17 History





[Adderall]    


 


HYDROcodone/APAP 10-325MG [Norco 1 tab PO Q4H PRN 03/22/17 07/21/17 History





]    


 


Multivitamins, Thera [Multivitamin 1 tab PO DAILY@1200 03/22/17 07/21/17 History





(formulary)]    


 


Cyanocobalamin [Vitamin B-12 1,000 mcg SQ QMONTH 07/21/17 07/21/17 History





Injection]    


 


Ibuprofen [Motrin] 800 mg PO TID PRN 07/21/17 07/21/17 History


 


Potassium Chloride ER [K-Dur 20] 20 meq PO DAILY 07/21/17 07/21/17 History


 


busPIRone HCl [Buspar] 5 mg PO BID 07/21/17 07/21/17 History











 Allergies











Allergy/AdvReac Type Severity Reaction Status Date / Time


 


No Known Allergies Allergy   Verified 07/21/17 08:33














Physical Exam


Vitals: 


 Vital Signs











  Temp Pulse Resp BP Pulse Ox


 


 07/22/17 07:00  98.3 F  99  18  139/80  98


 


 07/21/17 23:46  98.4 F  92  18  128/60  97


 


 07/21/17 14:52  98.4 F  87  16  128/69  97


 


 07/21/17 14:28   82  17  








 Intake and Output











 07/21/17 07/22/17 07/22/17





 22:59 06:59 14:59


 


Intake Total  300 


 


Balance  300 


 


Intake:   


 


  Oral  300 


 


Other:   


 


  Voiding Method  Diaper Diaper





   Incontinent


 


  # Voids 3 1 














PHYSICAL EXAMINATION: 





GENERAL: The patient is alert and oriented x3, not in any acute distress. Well 

developed, well nourished. 


HEENT: Pupils are round and equally reacting to light. EOMI. No scleral 

icterus. No conjunctival pallor. Normocephalic, atraumatic. No pharyngeal 

erythema. No thyromegaly. 


CARDIOVASCULAR: S1 and S2 present. No murmurs, rubs, or gallops. 


PULMONARY: Chest is clear to auscultation, no wheezing or crackles. 


ABDOMEN: Soft, nontender, nondistended, normoactive bowel sounds. No palpable 

organomegaly. 


MUSCULOSKELETAL: Right ankle fracture


EXTREMITIES: No cyanosis, clubbing, or pedal edema. 


NEUROLOGICAL: Gross neurological examination did not reveal any focal deficits. 


SKIN: Rashes on the body as mentioned above








Results


CBC & Chem 7: 


 07/22/17 07:17 07/22/17 07:17


Labs: 


 Abnormal Lab Results - Last 24 Hours (Table)











  07/21/17 07/21/17 07/22/17 Range/Units





  11:21 11:21 07:17 


 


WBC  10.8 H    (3.8-10.6)  k/uL


 


RBC  3.70 L    (3.80-5.40)  m/uL


 


Hgb     (11.4-16.0)  gm/dL


 


Hct     (34.0-46.0)  %


 


RDW  15.8 H    (11.5-15.5)  %


 


APTT     (22.0-30.0)  sec


 


Chloride   116 H  113 H  ()  mmol/L


 


Carbon Dioxide   19 L   (22-30)  mmol/L


 


BUN   44 H  25 H  (7-17)  mg/dL


 


Creatinine   1.67 H   (0.52-1.04)  mg/dL


 


Glucose   125 H  110 H  (74-99)  mg/dL


 


Magnesium    2.5 H  (1.6-2.3)  mg/dL














  07/22/17 07/22/17 Range/Units





  07:17 07:17 


 


WBC    (3.8-10.6)  k/uL


 


RBC   3.47 L  (3.80-5.40)  m/uL


 


Hgb   11.3 L  (11.4-16.0)  gm/dL


 


Hct   33.6 L  (34.0-46.0)  %


 


RDW    (11.5-15.5)  %


 


APTT  21.3 L   (22.0-30.0)  sec


 


Chloride    ()  mmol/L


 


Carbon Dioxide    (22-30)  mmol/L


 


BUN    (7-17)  mg/dL


 


Creatinine    (0.52-1.04)  mg/dL


 


Glucose    (74-99)  mg/dL


 


Magnesium    (1.6-2.3)  mg/dL








 Microbiology - Last 24 Hours (Table)











 07/21/17 03:40 Urine Culture - Preliminary





 Urine,Catheterized 














Thrombosis Risk Factor Assmnt





- Choose All That Apply


Each Factor Represents 1 point: Age 41-60 years


Each Risk Factor Represents 2 Points: Patient confined to bed


Thrombosis Risk Factor Assessment Total Risk Factor Score: 3


Thrombosis Risk Factor Assessment Level: Moderate Risk





Assessment and Plan


Plan: 


#1 altered mental status, metabolic encephalopathy: Secondary to acute renal 

failure which improved at this point of time.  The baby a component of toxic 

encephalopathy any from excessive narcotic use as well.


#2 right ankle fracture.  Patient is now "with well controlled pain 


#3 fibromyalgia


4 hypertension


#5 hypothyroidism

## 2017-07-23 LAB
ANION GAP SERPL CALC-SCNC: 5 MMOL/L
APTT BLD: 21.1 SEC (ref 22–30)
BUN SERPL-SCNC: 16 MG/DL (ref 7–17)
CALCIUM SPEC-MCNC: 8.9 MG/DL (ref 8.4–10.2)
CH: 30.3
CHCM: 31.9
CHLORIDE SERPL-SCNC: 110 MMOL/L (ref 98–107)
CO2 SERPL-SCNC: 26 MMOL/L (ref 22–30)
ERYTHROCYTE [DISTWIDTH] IN BLOOD BY AUTOMATED COUNT: 3.34 M/UL (ref 3.8–5.4)
ERYTHROCYTE [DISTWIDTH] IN BLOOD: 14.7 % (ref 11.5–15.5)
GLUCOSE SERPL-MCNC: 99 MG/DL (ref 74–99)
HCT VFR BLD AUTO: 31.9 % (ref 34–46)
HDW: 2.69
HGB BLD-MCNC: 10.5 GM/DL (ref 11.4–16)
INR PPP: 0.9 (ref ?–1.2)
MCH RBC QN AUTO: 31.4 PG (ref 25–35)
MCHC RBC AUTO-ENTMCNC: 32.9 G/DL (ref 31–37)
MCV RBC AUTO: 95.6 FL (ref 80–100)
NON-AFRICAN AMERICAN GFR(MDRD): >60
POTASSIUM SERPL-SCNC: 3.6 MMOL/L (ref 3.5–5.1)
PT BLD: 9.4 SEC (ref 9–12)
SODIUM SERPL-SCNC: 141 MMOL/L (ref 137–145)
WBC # BLD AUTO: 6.5 K/UL (ref 3.8–10.6)

## 2017-07-23 RX ADMIN — PREGABALIN SCH: 50 CAPSULE ORAL at 09:25

## 2017-07-23 RX ADMIN — HEPARIN SODIUM SCH UNIT: 5000 INJECTION, SOLUTION INTRAVENOUS; SUBCUTANEOUS at 20:13

## 2017-07-23 RX ADMIN — CEFAZOLIN SCH MLS/HR: 330 INJECTION, POWDER, FOR SOLUTION INTRAMUSCULAR; INTRAVENOUS at 06:12

## 2017-07-23 RX ADMIN — ESTROGENS, CONJUGATED SCH: 0.3 TABLET, FILM COATED ORAL at 09:25

## 2017-07-23 RX ADMIN — FLUTICASONE PROPIONATE SCH SPRAY: 50 SPRAY, METERED NASAL at 20:14

## 2017-07-23 RX ADMIN — HYDROCODONE BITARTRATE AND ACETAMINOPHEN PRN EACH: 10; 325 TABLET ORAL at 21:21

## 2017-07-23 RX ADMIN — HYDROMORPHONE HYDROCHLORIDE PRN MG: 1 INJECTION, SOLUTION INTRAMUSCULAR; INTRAVENOUS; SUBCUTANEOUS at 20:13

## 2017-07-23 RX ADMIN — ONDANSETRON PRN MG: 2 INJECTION INTRAMUSCULAR; INTRAVENOUS at 17:01

## 2017-07-23 RX ADMIN — FLUTICASONE PROPIONATE SCH: 50 SPRAY, METERED NASAL at 09:25

## 2017-07-23 RX ADMIN — THERA TABS SCH: TAB at 12:06

## 2017-07-23 RX ADMIN — LEVOTHYROXINE SODIUM SCH MCG: 50 TABLET ORAL at 06:12

## 2017-07-23 RX ADMIN — MUPIROCIN PRN APPLIC: 20 OINTMENT TOPICAL at 11:15

## 2017-07-23 RX ADMIN — HYDROCODONE BITARTRATE AND ACETAMINOPHEN PRN EACH: 10; 325 TABLET ORAL at 17:01

## 2017-07-23 RX ADMIN — ONDANSETRON PRN MG: 2 INJECTION INTRAMUSCULAR; INTRAVENOUS at 10:31

## 2017-07-23 RX ADMIN — HYDROCODONE BITARTRATE AND ACETAMINOPHEN PRN EACH: 10; 325 TABLET ORAL at 10:39

## 2017-07-23 RX ADMIN — HYDROCODONE BITARTRATE AND ACETAMINOPHEN PRN EACH: 10; 325 TABLET ORAL at 00:06

## 2017-07-23 RX ADMIN — VENLAFAXINE HYDROCHLORIDE SCH MG: 75 CAPSULE, EXTENDED RELEASE ORAL at 10:36

## 2017-07-23 RX ADMIN — HYDROCODONE BITARTRATE AND ACETAMINOPHEN PRN EACH: 10; 325 TABLET ORAL at 06:15

## 2017-07-23 RX ADMIN — BACLOFEN PRN MG: 10 TABLET ORAL at 21:21

## 2017-07-23 RX ADMIN — PANTOPRAZOLE SODIUM SCH: 40 TABLET, DELAYED RELEASE ORAL at 09:24

## 2017-07-23 RX ADMIN — PREGABALIN SCH MG: 50 CAPSULE ORAL at 17:08

## 2017-07-23 NOTE — P.OP
Date of Procedure: 07/23/17


Preoperative Diagnosis: 


Comminuted/displaced right bimalleolar ankle fracture


Postoperative Diagnosis: 


Same


Procedure(s) Performed: 


Open reduction and internal fixation right bimalleolar ankle fracture


Implants: 





Anesthesia: DAMION


Surgeon: Austin Olvera


Assistant #1: Morris Logan


Estimated Blood Loss (ml): 20


Pathology: none sent


Condition: stable


Disposition: PACU


Indications for Procedure: 


The patient's a 52-year-old female who presents after a recent fall with a 

significantly comminuted/displaced right bimalleolar ankle fracture/

dislocation.  A discussion of the risks and benefits of operative intervention 

was made with the patient.  She opted to proceed with surgery.  Operative risks 

to include infection, neurovascular injury, development of blood clots, 

possible development of nonunion, possible development of malunion and need for 

subsequent procedures was discussed.  Informed consent was obtained.


Operative Findings: 


as below


Description of Procedure: 


The patient was brought to the operating room, and after induction of general 

anesthesia the right lower extremity was prepped and draped in normal fashion.  

The limb was elevated to facilitate exsanguination.  The tourniquet was 

inflated to 270 mmHg.  A 10 cm incision was then made along the posterior 

lateral subcu border of the fibula.  The skin was incised sharply.  

Subcutaneous tissues were divided bluntly.  The fracture site was identified 

and cleaned of clot and debris.  The periosteum was elevated to help in 

exposure.  There was significant comminution of the distal fibula.  I did 

placed 2 cortical screws for additional fixation of the comminuted fragments.  

A 7-hole one third tubular Jaky plate was contoured to the distal fibula.  

This was attached proximally with 3.5 mm cortical screws of the appropriate 

length and distally with 4.0 cancellus screws the appropriate length.  I felt 

that adequate restoration of fibular length and overall stability.  This is 

done with the aid of fluoroscopy.  A 4 cm incision was then made along the 

medial malleolus.  Skin was incised sharply.  Subcu tissues were divided 

bluntly.  The fracture site was identified and cleaned of clot and debris.  The 

medial talar dome was inspected.  There was posterior comminution of the medial 

malleolus.  The fracture was then provisionally reduced.  Threaded guide pins 

were placed.  The appropriate cannulated drill was utilized.  Partially 

threaded 4.0 mm x 44 mm cancellus screws with washers were then inserted.  I 

was able to compress at the fracture site.  Final fluoroscopic views to include 

AP lateral mortise views showed adequate reduction of the mortise and overall 

fixation.  The wounds were irrigated normal saline.  The subcu tissues 

reapproximated interrupted 2-0 Vicryl sutures.  The skin was reprepped with 

staples.  A sterile dressing was applied.  The tourniquet was deflated with 

approximately 70 minutes total tourniquet time.  A posterior bulky splint was 

placed.  The patient was awoken from general anesthesia and transferred to the 

recovery room in good condition.  Blood loss was estimated at 20 mL.  No 

complications were incurred.  Sponge and needle counts were correct at the end 

the case.

## 2017-07-23 NOTE — P.PN
Subjective


52-year-old female came in with a altered mental status fall and right ankle 

fracture, patient will undergo operative intervention today.  Patient is alert 

oriented 3.  Patient is clinically doing well





Patient denied any chest pain, nausea, vomiting, abdominal pain, dysuria, new 

weakness.








Objective





- Vital Signs


Vital signs: 


 Vital Signs











Temp  97.5 F L  07/23/17 07:00


 


Pulse  89   07/23/17 07:00


 


Resp  16   07/23/17 07:00


 


BP  122/79   07/23/17 07:00


 


Pulse Ox  100   07/23/17 07:00








 Intake & Output











 07/22/17 07/23/17 07/23/17





 18:59 06:59 18:59


 


Intake Total 300  


 


Balance 300  


 


Intake:   


 


    


 


    Sodium Chloride 0.9% 1, 300  





    000 ml @ 50 mls/hr IV .   





    Q20H [facility] Rx#:[id number]   


 


Other:   


 


  Voiding Method Diaper Diaper Diaper





 Incontinent Incontinent Incontinent


 


  # Voids 3 1 














- Exam








PHYSICAL EXAMINATION: 





GENERAL: The patient is alert and oriented x3, not in any acute distress. Well 

developed, well nourished. 


HEENT: Pupils are round and equally reacting to light. EOMI. No scleral 

icterus. No conjunctival pallor. Normocephalic, atraumatic. No pharyngeal 

erythema. No thyromegaly. 


CARDIOVASCULAR: S1 and S2 present. No murmurs, rubs, or gallops. 


PULMONARY: Chest is clear to auscultation, no wheezing or crackles. 


ABDOMEN: Soft, nontender, nondistended, normoactive bowel sounds. No palpable 

organomegaly. 


MUSCULOSKELETAL: Right ankle fracture


EXTREMITIES: No cyanosis, clubbing, or pedal edema. 


NEUROLOGICAL: Gross neurological examination did not reveal any focal deficits. 


SKIN: Rashes on the body as mentioned above











- Labs


CBC & Chem 7: 


 07/23/17 07:22 07/23/17 07:22


Labs: 


 Abnormal Lab Results - Last 24 Hours (Table)











  07/23/17 07/23/17 07/23/17 Range/Units





  07:22 07:22 07:22 


 


RBC   3.34 L   (3.80-5.40)  m/uL


 


Hgb   10.5 L   (11.4-16.0)  gm/dL


 


Hct   31.9 L   (34.0-46.0)  %


 


APTT  21.1 L    (22.0-30.0)  sec


 


Chloride    110 H  ()  mmol/L








 Microbiology - Last 24 Hours (Table)











 07/21/17 03:40 Urine Culture - Final





 Urine,Catheterized 














Assessment and Plan


Plan: 


#1 altered mental status, metabolic encephalopathy: Secondary to acute renal 

failure which improved at this point of time.  The baby a component of toxic 

encephalopathy any from excessive narcotic use as well.


#2 right ankle fracture.  Patient is now "with well controlled pain. Patient is 

going for surgery. 


#3 fibromyalgia


4 hypertension


#5 hypothyroidism

## 2017-07-24 VITALS — RESPIRATION RATE: 16 BRPM

## 2017-07-24 LAB
APTT BLD: 22.3 SEC (ref 22–30)
INR PPP: 0.9 (ref ?–1.2)
PT BLD: 9.4 SEC (ref 9–12)

## 2017-07-24 RX ADMIN — ONDANSETRON PRN MG: 2 INJECTION INTRAMUSCULAR; INTRAVENOUS at 20:48

## 2017-07-24 RX ADMIN — CEFAZOLIN SCH MLS/HR: 330 INJECTION, POWDER, FOR SOLUTION INTRAMUSCULAR; INTRAVENOUS at 00:08

## 2017-07-24 RX ADMIN — PREGABALIN SCH MG: 50 CAPSULE ORAL at 07:33

## 2017-07-24 RX ADMIN — ONDANSETRON PRN MG: 2 INJECTION INTRAMUSCULAR; INTRAVENOUS at 09:08

## 2017-07-24 RX ADMIN — THERA TABS SCH EACH: TAB at 07:29

## 2017-07-24 RX ADMIN — ESTROGENS, CONJUGATED SCH MG: 0.3 TABLET, FILM COATED ORAL at 07:28

## 2017-07-24 RX ADMIN — HYDROMORPHONE HYDROCHLORIDE PRN MG: 1 INJECTION, SOLUTION INTRAMUSCULAR; INTRAVENOUS; SUBCUTANEOUS at 00:07

## 2017-07-24 RX ADMIN — FLUTICASONE PROPIONATE SCH SPRAY: 50 SPRAY, METERED NASAL at 20:49

## 2017-07-24 RX ADMIN — HYDROMORPHONE HYDROCHLORIDE PRN MG: 1 INJECTION, SOLUTION INTRAMUSCULAR; INTRAVENOUS; SUBCUTANEOUS at 17:53

## 2017-07-24 RX ADMIN — PANTOPRAZOLE SODIUM SCH MG: 40 TABLET, DELAYED RELEASE ORAL at 07:27

## 2017-07-24 RX ADMIN — HEPARIN SODIUM SCH UNIT: 5000 INJECTION, SOLUTION INTRAVENOUS; SUBCUTANEOUS at 20:49

## 2017-07-24 RX ADMIN — HYDROCODONE BITARTRATE AND ACETAMINOPHEN PRN EACH: 10; 325 TABLET ORAL at 07:33

## 2017-07-24 RX ADMIN — VENLAFAXINE HYDROCHLORIDE SCH MG: 75 CAPSULE, EXTENDED RELEASE ORAL at 07:28

## 2017-07-24 RX ADMIN — HYDROCODONE BITARTRATE AND ACETAMINOPHEN PRN EACH: 10; 325 TABLET ORAL at 03:37

## 2017-07-24 RX ADMIN — HEPARIN SODIUM SCH UNIT: 5000 INJECTION, SOLUTION INTRAVENOUS; SUBCUTANEOUS at 07:29

## 2017-07-24 RX ADMIN — HYDROCODONE BITARTRATE AND ACETAMINOPHEN PRN EACH: 10; 325 TABLET ORAL at 16:07

## 2017-07-24 RX ADMIN — HYDROCODONE BITARTRATE AND ACETAMINOPHEN PRN EACH: 10; 325 TABLET ORAL at 20:48

## 2017-07-24 RX ADMIN — PREGABALIN SCH MG: 50 CAPSULE ORAL at 00:07

## 2017-07-24 RX ADMIN — HYDROMORPHONE HYDROCHLORIDE PRN MG: 1 INJECTION, SOLUTION INTRAMUSCULAR; INTRAVENOUS; SUBCUTANEOUS at 14:34

## 2017-07-24 RX ADMIN — ONDANSETRON PRN MG: 2 INJECTION INTRAMUSCULAR; INTRAVENOUS at 14:33

## 2017-07-24 RX ADMIN — CEFAZOLIN SCH MLS/HR: 330 INJECTION, POWDER, FOR SOLUTION INTRAMUSCULAR; INTRAVENOUS at 20:49

## 2017-07-24 RX ADMIN — FLUTICASONE PROPIONATE SCH SPRAY: 50 SPRAY, METERED NASAL at 07:28

## 2017-07-24 RX ADMIN — LEVOTHYROXINE SODIUM SCH MCG: 50 TABLET ORAL at 06:09

## 2017-07-24 RX ADMIN — BACLOFEN PRN MG: 10 TABLET ORAL at 09:08

## 2017-07-24 RX ADMIN — PREGABALIN SCH MG: 50 CAPSULE ORAL at 16:07

## 2017-07-24 RX ADMIN — HYDROMORPHONE HYDROCHLORIDE PRN MG: 1 INJECTION, SOLUTION INTRAMUSCULAR; INTRAVENOUS; SUBCUTANEOUS at 11:19

## 2017-07-24 RX ADMIN — HYDROMORPHONE HYDROCHLORIDE PRN MG: 1 INJECTION, SOLUTION INTRAMUSCULAR; INTRAVENOUS; SUBCUTANEOUS at 07:29

## 2017-07-24 RX ADMIN — HYDROCODONE BITARTRATE AND ACETAMINOPHEN PRN EACH: 10; 325 TABLET ORAL at 11:19

## 2017-07-24 NOTE — XR
EXAMINATION TYPE: XR chest 1V

 

DATE OF EXAM: 7/24/2017

 

COMPARISON: Prior chest x-ray 3/24/2017

 

HISTORY: Follow-up, abnormal chest x-ray

 

TECHNIQUE: Single frontal view of the chest is obtained.

 

FINDINGS: The patient is rotated. There is no focal air space opacity, pleural effusion, or pneumotho
rax seen.  The cardiac silhouette size is stable and possibly enlarged.   The osseous structures are 
intact.

 

IMPRESSION:  Suspect cardiomegaly. There is improvement in patient's volume status.

## 2017-07-24 NOTE — FL
FLUOROSCOPY

 

39 seconds of fluoroscopy time were utilized during internal fixation of the right ankle. 3 images do
cument the procedure.

## 2017-07-24 NOTE — P.PN
Subjective


Principal diagnosis: 





Right ankle bimalleolar fracture





Patient is seen today resting in her hospital bed, she appears comfortable.  

She notes minimal pain throughout the right ankle.  She denies any chest pain, 

lightheadedness, shortness of breath.





Objective





- Vital Signs


Vital signs: 


 Vital Signs











Temp  97.6 F   07/24/17 14:26


 


Pulse  101 H  07/24/17 15:18


 


Resp  16   07/24/17 15:18


 


BP  129/91   07/24/17 14:26


 


Pulse Ox  99   07/24/17 14:26








 Intake & Output











 07/23/17 07/24/17 07/24/17





 18:59 06:59 18:59


 


Intake Total 1000 1180 830


 


Output Total 20  


 


Balance 980 1180 830


 


Weight  86.183 kg 86.183 kg


 


Intake:   


 


  IV 1000  350


 


    Sodium Chloride 0.9% 1,   350





    000 ml @ 50 mls/hr IV .   





    Q20H [facility] Rx#:[id number]   


 


  Intake, IV Titration  400 





  Amount   


 


    Sodium Chloride 0.9% 1,  400 





    000 ml @ 50 mls/hr IV .   





    Q20H [facility] Rx#:[id number]   


 


  Oral  780 480


 


Output:   


 


  Estimated Blood Loss 20  


 


Other:   


 


  Voiding Method Diaper Diaper Bedside Commode





 Incontinent Incontinent 


 


  # Voids 2 1 3


 


  # Bowel Movements 1  














- Exam





Right lower extremity:


Splint remains intact, it's clean and dry.  Sensation to light touch both 

proximal distal to the cast are intact.  She is able to wiggle the toes.





- Labs


CBC & Chem 7: 


 07/23/17 07:22





 07/23/17 07:22





Assessment and Plan


Plan: 





Assessment:


1.  Postop day #1 status post ORIF right bimalleolar ankle fracture





Plan:


1.  Pain control, continue current medication


2.  Nonweightbearing right ankle


3.  Ice and elevate


4.  GI and DVT prophylaxis, continue subcu medication


5.  Plan for discharge to rehab tomorrow


Time with Patient: Less than 30

## 2017-07-25 VITALS — DIASTOLIC BLOOD PRESSURE: 91 MMHG | HEART RATE: 93 BPM | TEMPERATURE: 98.4 F | SYSTOLIC BLOOD PRESSURE: 142 MMHG

## 2017-07-25 LAB
APTT BLD: 22.1 SEC (ref 22–30)
INR PPP: 0.9 (ref ?–1.2)
PT BLD: 9.4 SEC (ref 9–12)

## 2017-07-25 RX ADMIN — PANTOPRAZOLE SODIUM SCH MG: 40 TABLET, DELAYED RELEASE ORAL at 07:47

## 2017-07-25 RX ADMIN — HYDROCODONE BITARTRATE AND ACETAMINOPHEN PRN EACH: 10; 325 TABLET ORAL at 15:14

## 2017-07-25 RX ADMIN — HYDROCODONE BITARTRATE AND ACETAMINOPHEN PRN EACH: 10; 325 TABLET ORAL at 06:30

## 2017-07-25 RX ADMIN — ESTROGENS, CONJUGATED SCH MG: 0.3 TABLET, FILM COATED ORAL at 07:47

## 2017-07-25 RX ADMIN — ONDANSETRON PRN MG: 2 INJECTION INTRAMUSCULAR; INTRAVENOUS at 11:36

## 2017-07-25 RX ADMIN — HYDROCODONE BITARTRATE AND ACETAMINOPHEN PRN EACH: 10; 325 TABLET ORAL at 11:11

## 2017-07-25 RX ADMIN — FLUTICASONE PROPIONATE SCH SPRAY: 50 SPRAY, METERED NASAL at 07:50

## 2017-07-25 RX ADMIN — HYDROCODONE BITARTRATE AND ACETAMINOPHEN PRN EACH: 10; 325 TABLET ORAL at 00:39

## 2017-07-25 RX ADMIN — HEPARIN SODIUM SCH UNIT: 5000 INJECTION, SOLUTION INTRAVENOUS; SUBCUTANEOUS at 07:49

## 2017-07-25 RX ADMIN — PREGABALIN SCH MG: 50 CAPSULE ORAL at 00:36

## 2017-07-25 RX ADMIN — HYDROMORPHONE HYDROCHLORIDE PRN MG: 1 INJECTION, SOLUTION INTRAMUSCULAR; INTRAVENOUS; SUBCUTANEOUS at 04:35

## 2017-07-25 RX ADMIN — VENLAFAXINE HYDROCHLORIDE SCH MG: 75 CAPSULE, EXTENDED RELEASE ORAL at 07:48

## 2017-07-25 RX ADMIN — THERA TABS SCH EACH: TAB at 07:48

## 2017-07-25 RX ADMIN — MUPIROCIN PRN APPLIC: 20 OINTMENT TOPICAL at 07:50

## 2017-07-25 RX ADMIN — LEVOTHYROXINE SODIUM SCH MCG: 50 TABLET ORAL at 06:30

## 2017-07-25 RX ADMIN — PREGABALIN SCH MG: 50 CAPSULE ORAL at 07:46

## 2017-07-25 NOTE — DS
DATE OF ADMISSION: 07/21/2017

DATE OF DISCHARGE:  07/25/2017



DISCHARGE MEDICATIONS:  

1.  Aspirin 325 mg once a day. 

2.  Norco 10/325 one q.6. 

3.  Bactroban ointment to skin lesions. 

4.  Protonix 40 once daily. 

5.  Effexor  daily. 

6.  Lisinopril 10/12.5 one daily. 

7.  Zofran 4 mg q.6 p.r.n. nausea and vomiting. 

8.  Lyrica 150 q.8. 

9.  Estrogen 0.3 one daily. 

10. Synthroid 50 mcg once daily. 

11. Baclofen 10 mg q.h.s. p.r.n. muscle spasms. 

12.  Flonase 2 sprays each nostril b.i.d.

13.  Multivitamin 1 daily. 

14.  Vitamin B12 injection one monthly subcu. 

15.  Motrin 800 t.i.d. p.r.n. pain. 

16.  BuSpar 5 mg b.i.d. p.r.n. anxiety. 

17.  Lasix 40 mg p.r.n. edema. 

18.  Potassium 20 mEq daily with the administration of Lasix. 



She is to follow up with me in 2 weeks and with Dr. Olvera/Morris Logan in 2 
weeks. She is going to be transferred to an extended care facility. Upon 
discharge from extended care facility for rehab she is to be transferred to a 
drug rehab program that her and her daughter are working on currently. 
Recommend drug detoxification and rehabilitation. Recommend weaning off all 
pain medications including Norco as well as Adderall.  Adderall has currently 
been discontinued and will remain discontinued. Patient should avoid all 
addictive medications including benzodiazepines.  



HOSPITAL COURSE:  This is a 52-year-old white female who was admitted after a 
fall and fracture to her right lower ankle. She underwent ORIF of the right 
ankle. Her first presentation was somewhat of confusion, mental status changes. 
most likely secondary to excessive narcotic use. She was doing quite well but 
knows she has a problem with prescription drug abuse and she is currently going 
to work out her problems with professional help and her daughter. 



FINAL DIAGNOSES:  

1.  Open reduction and internal fixation of the right ankle secondary to 
fracture. 

2.  Altered mental status with metabolic encephalopathy. 

3.  Acute renal failure, which is resolved. 

4.  Excessive narcotic use. 

5.  Fibromyalgia. 

6.  Hypertension. 

7.  Hypothyroidism. 



PLAN:  Admit to rehab and then secondary to admission to drug rehabilitation 
program at discharge. 
VITALY

## 2017-07-25 NOTE — P.PN
Subjective


Principal diagnosis: 





Right ankle bimalleolar fracture





Patient is seen today resting in her hospital bed, she appears comfortable.  

She notes minimal pain throughout the right ankle.  She denies any chest pain, 

lightheadedness, shortness of breath.





Objective





- Vital Signs


Vital signs: 


 Vital Signs











Temp  98.4 F   07/25/17 07:00


 


Pulse  93   07/25/17 07:00


 


Resp  16   07/25/17 07:00


 


BP  142/91   07/25/17 07:00


 


Pulse Ox  100   07/25/17 07:00








 Intake & Output











 07/24/17 07/25/17 07/25/17





 18:59 06:59 18:59


 


Intake Total 830 800 


 


Balance 830 800 


 


Weight 86.183 kg  


 


Intake:   


 


   400 


 


    Sodium Chloride 0.9% 1, 350 400 





    000 ml @ 50 mls/hr IV .   





    Q20H [facility] Rx#:[id number]   


 


  Intake, IV Titration  400 





  Amount   


 


    Sodium Chloride 0.9% 1,  400 





    000 ml @ 50 mls/hr IV .   





    Q20H [facility] Rx#:[id number]   


 


  Oral 480  


 


Other:   


 


  Voiding Method Bedside Commode Bedside Commode Bedside Commode


 


  # Voids 3  














- Exam





Right lower extremity:


Splint remains intact, it's clean and dry.  Sensation to light touch both 

proximal distal to the cast are intact.  She is able to wiggle the toes.





- Labs


CBC & Chem 7: 


 07/23/17 07:22





 07/23/17 07:22





Assessment and Plan


Plan: 





Assessment:


1.  Postop day #2 status post ORIF right bimalleolar ankle fracture





Plan:


1.  Pain control, continue current medication


2.  Nonweightbearing right ankle


3.  Ice and elevate


4.  GI and DVT prophylaxis, discharge on aspirin 325mg once per day


5.  Plan for discharge to rehab tomorrow


Time with Patient: Less than 30

## 2017-08-23 ENCOUNTER — HOSPITAL ENCOUNTER (OUTPATIENT)
Dept: HOSPITAL 47 - OR | Age: 52
LOS: 1 days | Discharge: HOME HEALTH | End: 2017-08-24
Payer: COMMERCIAL

## 2017-08-23 VITALS — BODY MASS INDEX: 32.5 KG/M2

## 2017-08-23 DIAGNOSIS — I95.81: ICD-10-CM

## 2017-08-23 DIAGNOSIS — M79.7: ICD-10-CM

## 2017-08-23 DIAGNOSIS — K21.9: ICD-10-CM

## 2017-08-23 DIAGNOSIS — Z98.84: ICD-10-CM

## 2017-08-23 DIAGNOSIS — F32.9: ICD-10-CM

## 2017-08-23 DIAGNOSIS — L98.9: ICD-10-CM

## 2017-08-23 DIAGNOSIS — M19.011: ICD-10-CM
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
## 2017-08-23 DIAGNOSIS — F41.9: ICD-10-CM

## 2017-08-23 DIAGNOSIS — E03.9: ICD-10-CM

## 2017-08-23 DIAGNOSIS — F99: ICD-10-CM

## 2017-08-23 DIAGNOSIS — T84.126A: Primary | ICD-10-CM

## 2017-08-23 DIAGNOSIS — Z86.14: ICD-10-CM

## 2017-08-23 DIAGNOSIS — I10: ICD-10-CM
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
## 2017-08-23 DIAGNOSIS — Z79.82: ICD-10-CM

## 2017-08-23 DIAGNOSIS — Z79.899: ICD-10-CM

## 2017-08-23 DIAGNOSIS — G43.909: ICD-10-CM

## 2017-08-23 DIAGNOSIS — F90.9: ICD-10-CM

## 2017-08-23 LAB — GLUCOSE BLD-MCNC: 89 MG/DL (ref 75–99)

## 2017-08-23 PROCEDURE — 73030 X-RAY EXAM OF SHOULDER: CPT

## 2017-08-23 PROCEDURE — 85027 COMPLETE CBC AUTOMATED: CPT

## 2017-08-23 PROCEDURE — 27766 OPTX MEDIAL ANKLE FX: CPT

## 2017-08-23 PROCEDURE — 80053 COMPREHEN METABOLIC PANEL: CPT

## 2017-08-23 PROCEDURE — 73600 X-RAY EXAM OF ANKLE: CPT

## 2017-08-23 RX ADMIN — PREGABALIN SCH MG: 75 CAPSULE ORAL at 21:00

## 2017-08-23 RX ADMIN — ONDANSETRON ONE MG: 2 INJECTION INTRAMUSCULAR; INTRAVENOUS at 13:00

## 2017-08-23 RX ADMIN — HYDROMORPHONE HYDROCHLORIDE PRN MG: 1 INJECTION, SOLUTION INTRAMUSCULAR; INTRAVENOUS; SUBCUTANEOUS at 17:08

## 2017-08-23 RX ADMIN — HYDROMORPHONE HYDROCHLORIDE PRN MG: 1 INJECTION, SOLUTION INTRAMUSCULAR; INTRAVENOUS; SUBCUTANEOUS at 17:00

## 2017-08-23 RX ADMIN — HYDROCODONE BITARTRATE AND ACETAMINOPHEN PRN EACH: 5; 325 TABLET ORAL at 21:02

## 2017-08-23 RX ADMIN — ONDANSETRON ONE MG: 2 INJECTION INTRAMUSCULAR; INTRAVENOUS at 16:30

## 2017-08-23 RX ADMIN — HYDROMORPHONE HYDROCHLORIDE PRN MG: 1 INJECTION, SOLUTION INTRAMUSCULAR; INTRAVENOUS; SUBCUTANEOUS at 16:30

## 2017-08-23 RX ADMIN — HYDROMORPHONE HYDROCHLORIDE PRN MG: 1 INJECTION, SOLUTION INTRAMUSCULAR; INTRAVENOUS; SUBCUTANEOUS at 16:40

## 2017-08-23 RX ADMIN — FLUTICASONE PROPIONATE SCH SPRAY: 50 SPRAY, METERED NASAL at 22:21

## 2017-08-23 NOTE — XR
EXAMINATION TYPE: XR ankle limited RT

 

DATE OF EXAM: 8/23/2017

 

CLINICAL HISTORY: Right ankle ORIF

 

TECHNIQUE:  Frontal, lateral and oblique images of the right ankle are obtained.

 

COMPARISON: None.

 

FINDINGS/IMPRESSION: Two paper films were provided from open reduction internal fixation of fibular a
nd tibia fractures of the lateral malleolus and medial malleolus. Surgical hardware is appreciated quan
th laterally and medially. Anatomic alignment is achieved.

## 2017-08-23 NOTE — FL
EXAMINATION TYPE: FL guidance operating room

 

DATE OF EXAM: 8/23/2017

 

CLINICAL HISTORY: ORIF right ankle

 

TECHNIQUE: Fluoroscopy.

 

COMPARISON:  None.

 

FINDINGS/IMPRESSION:  Fluoroscopic guidance was provided during procedure performed by Dr. Olvera.  A 
total of 38 seconds of fluoroscopic time was utilized during the procedure and 2 spot images was acqu
ired.

## 2017-08-23 NOTE — HP
CHIEF COMPLAINT:  Right ankle pain. 



HISTORY OF PRESENT ILLNESS:  The patient is a 52-year-old female who presents 
after undergoing initial fixation of a right bimalleolar ankle fracture 
dislocation July 23, 2017 after a recent injury.  On 8/18/2017 at home she feel 
off her scooter and twisted her right ankle. Upon evaluation in the office, she 
was noted to have increasing displacement of the medial malleolar fracture and 
loss of fixation. 



Past medical history is significant for psychiatric disorder, fibromyalgia, 
reflux disease, hypertension, hypothyroidism. 



PAST SURGICAL HISTORY:  Significant for previous fixation of a right 
bimalleolar ankle fracture, lap banding, previous foot surgery. 



CURRENT MEDICATIONS:  

1.  Aspirin. 

2.  Baclofen. 

3.  Buspirone. 

4.  Effexor. 

5.  Ibuprofen. 

6.  Lisinopril. 

7.  Lyrica. 

8.  Omeprazole. 

9.  Premarin. 

10. Synthroid. 



She denies drug allergies. 



FAMILY HISTORY:  Significant for heart disease. 



SOCIAL HISTORY:  Significant for one pack per day tobacco use.



Sixteen point review of systems otherwise reviewed and is noncontributory. 



On examination, the patient is approximately 5 feet 4 inches, 190 pounds of 
endomorphic habits. 

HEENT exam is nonfocal. Neck is supple.  She has painless passive motion of 
right hip.  She is nontender about the right knee and proximal fibula. 

On examination of the right ankle, the medial and lateral incisions are well 
healed.  There is mild and lateral swelling.  She is tender over the medial 
malleolus.  No mid or forefoot tenderness is noted. She has limited motion in 
the ankle itself. 



X-rays to include 3 views of the right obtained in the office show previous 
fixation of the bimalleolar ankle fracture with overall fibular length 
maintained. The lateral malleolus appears reasonably aligned. There is 
increasing displacement of the medial malleolus with what appears to be failure 
of the fixation. 



IMPRESSION: 

1.  Status post ORIF right bimalleolar ankle fracture with loss of fixation of 
the medial malleolus. 

2.  Psychiatric disorder. 

3.  Tobacco use. 



RECOMMENDATIONS:  I talked to the patient at length regarding her treatment 
options.  At this point, we will plan to proceed with revision fixation of the 
medial malleolus. We will potentially keep the patient for 23-hour hold 
postoperatively. Risks and benefits were discussed at length in layman's terms. 



VITALY

## 2017-08-23 NOTE — P.OP
Date of Procedure: 08/23/17


Preoperative Diagnosis: 


Loss of fixation right medial malleolar ankle fracture


Postoperative Diagnosis: 


Same


Procedure(s) Performed: 


Removal of hardware right medial malleolus/revision ORIF right medial malleolus 

with local bone grafting


Implants: 


Synthes  3-hole plate


Anesthesia: DAMION


Surgeon: Austin Olvera


Estimated Blood Loss (ml): 10


Pathology: none sent


Condition: stable


Disposition: PACU


Indications for Procedure: 


The patient is a 52-year-old female who previously underwent open reduction and 

internal fixation of a comminuted right bimalleolar ankle fracture who 

sustained a recent reinjury after which she is noted to have increasing 

displacement of the medial malleolus and loss of fixation.  A discussion of the 

risks and benefits of operative intervention was made with the patient.  She 

opted to proceed.  Operative risks to include infection, neurovascular injury, 

development of nonunion, development of malunion, and possible need for self 

procedures was discussed.  Informed consent was obtained.


Operative Findings: 


As below


Description of Procedure: 


The patient was brought to the operating room, and after induction of general 

anesthesia the right lower extremity was prepped and draped in normal fashion.  

The tourniquet was inflated to 270 mmHg.  A 4 cm incision was then made 

centered over the medial malleolus of the right ankle.  The skin was incised 

sharply.  Subcutaneous tissues were divided bluntly.  A self-retaining 

retractor was placed.  The periosteum was elevated.  The previous fracture site 

was identified and was noted to have significant fibrous tissue and gapping.  

The previous hardware was removed with a screwdriver.  The fracture site was 

cleaned of clot and debris.  The medial talar dome was inspected.  The fracture 

was reduced provisionally with a K wire.  A 3-hole hole plate was placed and 

tamped into the medial malleolus.  It was attached proximally with 3.5 mm 

cortical screws the appropriate length.  A 4.0 mm cancellus screw was inserted 

in the distal hole.  Good purchase was obtained.  Final fluoroscopic view 

showed adequate reduction of the medial malleolus and compression at the 

fracture site as well as restoration of the ankle mortise.  I did previously 

remove some local bone graft from the distal medial tibia with a curette and 

this bone graft was placed at the fracture site.  The subcutaneous tissues were 

reapproximated with interrupted 3-0 Vicryl suture.  The skin was reapproximated 

4-0 subcuticular Prolene suture.  Steri-Strips were applied.  A sterile 

dressing was applied.  The tourniquet was deflated with approximately 1 hour 

total tourniquet time.  The patient's fracture boot was placed.  She was then 

awoken from general anesthesia and transferred to recovery room in good 

condition.  Blood loss was estimated at 10 mL.  No complications were incurred.

## 2017-08-24 VITALS — SYSTOLIC BLOOD PRESSURE: 142 MMHG | DIASTOLIC BLOOD PRESSURE: 69 MMHG | HEART RATE: 84 BPM | TEMPERATURE: 97.1 F

## 2017-08-24 VITALS — RESPIRATION RATE: 17 BRPM

## 2017-08-24 LAB
ALP SERPL-CCNC: 122 U/L (ref 38–126)
ALT SERPL-CCNC: 25 U/L (ref 9–52)
ANION GAP SERPL CALC-SCNC: 6 MMOL/L
AST SERPL-CCNC: 17 U/L (ref 14–36)
BUN SERPL-SCNC: 21 MG/DL (ref 7–17)
CALCIUM SPEC-MCNC: 8.8 MG/DL (ref 8.4–10.2)
CH: 30
CHCM: 32
CHLORIDE SERPL-SCNC: 106 MMOL/L (ref 98–107)
CO2 SERPL-SCNC: 25 MMOL/L (ref 22–30)
ERYTHROCYTE [DISTWIDTH] IN BLOOD BY AUTOMATED COUNT: 3.56 M/UL (ref 3.8–5.4)
ERYTHROCYTE [DISTWIDTH] IN BLOOD: 14.7 % (ref 11.5–15.5)
GLUCOSE SERPL-MCNC: 94 MG/DL (ref 74–99)
HCT VFR BLD AUTO: 33.5 % (ref 34–46)
HDW: 2.87
HGB BLD-MCNC: 10.5 GM/DL (ref 11.4–16)
MCH RBC QN AUTO: 29.5 PG (ref 25–35)
MCHC RBC AUTO-ENTMCNC: 31.4 G/DL (ref 31–37)
MCV RBC AUTO: 94 FL (ref 80–100)
NON-AFRICAN AMERICAN GFR(MDRD): >60
POTASSIUM SERPL-SCNC: 3.9 MMOL/L (ref 3.5–5.1)
PROT SERPL-MCNC: 5.7 G/DL (ref 6.3–8.2)
SODIUM SERPL-SCNC: 137 MMOL/L (ref 137–145)
WBC # BLD AUTO: 12.8 K/UL (ref 3.8–10.6)

## 2017-08-24 RX ADMIN — HYDROMORPHONE HYDROCHLORIDE PRN MG: 1 INJECTION, SOLUTION INTRAMUSCULAR; INTRAVENOUS; SUBCUTANEOUS at 00:02

## 2017-08-24 RX ADMIN — HYDROCODONE BITARTRATE AND ACETAMINOPHEN PRN EACH: 5; 325 TABLET ORAL at 08:40

## 2017-08-24 RX ADMIN — HYDROCODONE BITARTRATE AND ACETAMINOPHEN PRN EACH: 5; 325 TABLET ORAL at 02:56

## 2017-08-24 RX ADMIN — FLUTICASONE PROPIONATE SCH SPRAY: 50 SPRAY, METERED NASAL at 08:37

## 2017-08-24 RX ADMIN — PREGABALIN SCH MG: 75 CAPSULE ORAL at 04:09

## 2017-08-24 RX ADMIN — HYDROMORPHONE HYDROCHLORIDE PRN MG: 1 INJECTION, SOLUTION INTRAMUSCULAR; INTRAVENOUS; SUBCUTANEOUS at 07:01

## 2017-08-24 NOTE — P.DS
Providers


Date of admission: 





08/23/2017


Expected date of discharge: 08/24/17


Attending physician: 


Austin Olvera





Consults: 





 





08/23/17 18:12


Consult Physician Routine 


   Consulting Provider: Chris Carcamo Reason/Comments: medical management


   Do you want consulting provider notified?: Yes











Primary care physician: 


Chris Carcamo





[facility] Course: 





Date of admission: 08/23/2017


Date of discharge: 08/24/2017





Admission diagnosis: Status post hardware removal right ankle/ORIF right medial 

malleolar ankle fracture with bone graft


Discharge diagnosis: Same





Attending physician: Dr. Olvera





Surgical procedures: Hardware removal right ankle/open reduction internal 

fixation right medial malleolus ankle fracture with bone graft





Brief history: Patient is a 52-year-old female who was initially seen about a 

month ago at [facility] after sustaining a fall that resulted in a 

right ankle dislocation with bimalleolar fracture.  She initially was underwent 

an open reduction internal fixation procedure by Dr. Olvera.  Patient has been 

followed in the outpatient setting with clinical exam and x-ray.  It was noted 

on her last visit that the medial malleolus had displaced past acceptable 

measurements, and a revision surgery was scheduled for a 23 2017.





Hospital course: Details of patient's surgery can be found in operative report.

  Patient tolerated the procedure well and was subsequently transported to 

orthopedic floor.  Patient's orthopeidc and medical care was provided daily. 

Patient had daily laboratory tests performed for evaluation of overall blood 

counts.   Patient was treated with aspirin for their postoperative DVT 

prophylaxis during their inpatient stay.  Patient was noted to have a 

relatively uneventful postoperative course.  Patient reported satisfactory pain 

control with oral pain medications by postoperative day 0.  Patient showed 

satisfactory progress with physical therapy.  Patient moved steadily through 

the program and had no difficulty meeting the goals by postoperative day 1.  

Given patient's otherwise satisfactory course and having met physical therapy 

goals, plan is to discharge patient home on postoperative day 1.





Discharge condition/disposition: Patient will be discharged home in stable 

condition.





Discharge medications: Instructions are given on resumption of patient's normal 

daily medications per primary care recommendation, in addition patient will be 

prescribed aspirin 325 mg.





Discharge instructions:


1.  Wound care and infection precautions, keep incision dry and covered while 

showering, no lotions, creams, moisturizers. No soaking, tubs, pools, hottubs. 

Do not scrub over the incision.


2.  Nonweightbearing right lower extremity, utilize knee scooter and Cam Walker 

boot


3.  Ice and elevate when necessary.  Do not exceed 20 minutes per hour with ice 

pack.


4.  Utilize compression sleeve until seen at first follow up appointment.


6.  Pain meds and anticoagulants per prescription.


7.  Pain medication has potential to cause constipation. Increase oral fluid 

and fiber intake. Contact primary care provider if you have not had a bowel 

movement within 48 hours after discharge


8.  No anti-inflammatory medication until discussed at first post operative 

visit, this including Motrin, Aleve, Mobic, Diclofenac, Aspirin.


9.  Follow up in office at 2 weeks postop with Ken Logan PA-C


10. Follow up with your primary care doctor 7-10 days after discharge.


11. Contact [facility] with any questions, [phone].











Procedures: 





Hardware removal right ankle/ORIF right medial malleolus ankle fracture with 

bone graft


Patient Condition at Discharge: Fair





Plan - Discharge Summary


New Discharge Prescriptions: 


No Action


   Pregabalin [Lyrica] 150 mg PO Q8H #90 capsule


   Estrogens, Conjugated [Premarin] 0.3 mg PO DAILY


   Levothyroxine Sodium [Synthroid] 50 mcg PO DAILY


   Baclofen 10 - 20 mg PO HS PRN


     PRN Reason: Muscle Spasm


   Fluticasone Nasal Spray [Flonase Nasal Spray] 2 spray EA NOSTRIL BID


   Multivitamins, Thera [Multivitamin (formulary)] 1 tab PO DAILY


   Cyanocobalamin [Vitamin B-12 Injection] 1,000 mcg SQ QMONTH


   Ibuprofen [Motrin] 800 mg PO TID PRN


     PRN Reason: Pain


   Aspirin 325 mg PO DAILY #30 tab


   Hydrocodone/Acetaminophen [Norco ] 1 each PO Q6H PRN #60 tab


     PRN Reason: Pain


   Mupirocin 2% Oint [Bactroban 2% Oint] 1 applic TOPICAL TID PRN  dose


     PRN Reason: Dry Skin


   Pantoprazole [Protonix] 40 mg PO AC-BRKFST  tab


   Venlafaxine HCl ER [Effexor XR] 225 mg PO DAILY  cap


   Lisinopril-Hctz 10-12.5 mg [Zestoretic 10-12.5] 1 tab PO DAILY #30 tab


   Ondansetron [Zofran] 4 mg PO Q6HR PRN  tab


     PRN Reason: Nausea And Vomiting


   busPIRone HCl [Buspar] 5 mg PO BID PRN #0 tab


     PRN Reason: Anxiety


   Furosemide [Lasix] 40 mg PO DAILY PRN #0 


     PRN Reason: Edema


   Potassium Chloride ER [K-Dur 20] 20 meq PO DAILY PRN #0 


     PRN Reason: Edema


Discharge Medication List





Pregabalin [Lyrica] 150 mg PO Q8H #90 capsule 01/27/16 [Rx]


Estrogens, Conjugated [Premarin] 0.3 mg PO DAILY 06/09/16 [History]


Baclofen 10 - 20 mg PO HS PRN 02/03/17 [History]


Fluticasone Nasal Spray [Flonase Nasal Spray] 2 spray EA NOSTRIL BID 02/03/17 [

History]


Levothyroxine Sodium [Synthroid] 50 mcg PO DAILY 02/03/17 [History]


Multivitamins, Thera [Multivitamin (formulary)] 1 tab PO DAILY 03/22/17 [History

]


Cyanocobalamin [Vitamin B-12 Injection] 1,000 mcg SQ QMONTH 07/21/17 [History]


Ibuprofen [Motrin] 800 mg PO TID PRN 07/21/17 [History]


Aspirin 325 mg PO DAILY #30 tab 07/25/17 [Rx]


Furosemide [Lasix] 40 mg PO DAILY PRN #0  07/25/17 [Rx]


Hydrocodone/Acetaminophen [Norco ] 1 each PO Q6H PRN #60 tab 07/25/17 [Rx]


Lisinopril-Hctz 10-12.5 mg [Zestoretic 10-12.5] 1 tab PO DAILY #30 tab 07/25/17 

[Rx]


Mupirocin 2% Oint [Bactroban 2% Oint] 1 applic TOPICAL TID PRN  dose 07/25/17 [

Rx]


Ondansetron [Zofran] 4 mg PO Q6HR PRN  tab 07/25/17 [Rx]


Pantoprazole [Protonix] 40 mg PO AC-BRKFST  tab 07/25/17 [Rx]


Potassium Chloride ER [K-Dur 20] 20 meq PO DAILY PRN #0  07/25/17 [Rx]


Venlafaxine HCl ER [Effexor XR] 225 mg PO DAILY  cap 07/25/17 [Rx]


busPIRone HCl [Buspar] 5 mg PO BID PRN #0 tab 07/25/17 [Rx]








Follow up Appointment(s)/Referral(s): 


Chris Carcamo DO [Primary Care Provider] - 1 Week


[facility], [NON-STAFF] - 


Morris Logan PAC [PHYSICIAN ASSISTANT] - 2 Weeks


Activity/Diet/Wound Care/Special Instructions: 


Discharge instructions:


1.  Nonweightbearing right lower extremity, utilize Cam Walker boot and knee 

scooter


2.  Pain medication as needed


3.  Aspirin 325 mg once a day for DVT prophylaxis


4.  Keep leg covered when showering


5.  Icing and elevating techniques discussed with patient


6.  Follow-up at [facility] in 2 weeks


Discharge Disposition: HOME SELF-CARE

## 2017-08-24 NOTE — P.PN
Subjective


Principal diagnosis: 





Status post hardware removal right ankle, open reduction internal fixation 

right medial malleolus ankle fracture





Patient is seen today resting in her hospital bed, she appears comfortable.  

She has occasional discomfort in the right ankle.  She is utilizing the Cam 

Walker boot, she is nonweightbearing.  She denies any headaches, lightheadedness

, chest pain, shortness of breath.





Objective





- Vital Signs


Vital signs: 


 Vital Signs











Temp  97.1 F L  08/24/17 07:35


 


Pulse  84   08/24/17 07:35


 


Resp  17   08/24/17 02:40


 


BP  142/69   08/24/17 07:35


 


Pulse Ox  96   08/24/17 07:35








 Intake & Output











 08/23/17 08/24/17 08/24/17





 18:59 06:59 18:59


 


Intake Total 1551 20 


 


Output Total 10  


 


Balance 1541 20 


 


Weight 86.183 kg  


 


Intake:   


 


  IV 1551  


 


  Intake, IV Titration  20 





  Amount   


 


    Lactated Ringers 1,000 ml  20 





    @ 20 mls/hr IV .Q24H [facility]   





    Rx#:[id number]   


 


Output:   


 


  Estimated Blood Loss 10  


 


Other:   


 


  Voiding Method  Bedside Commode Bedside Commode


 


  # Voids  2 














- Exam





Right lower extremity:


Ace bandage present over the right ankle, she is also utilizing the Cam Walker 

boot.  Sensation in the toes is intact, also proximal to the Cam Walker boot.  

She is able to wiggle the toes.  Her calf is soft, no tenderness with palpation.





Assessment and Plan


Plan: 





Assessment:


1.  Postop day #1 status post hardware removal right ankle/ORIF right medial 

malleolus ankle fracture


2.  Right shoulder pain





Plan:


Patient stable for discharge to home


She will remain nonweightbearing, utilizing Cam Walker boot and scooter


Patient status medication at home


Utilize aspirin 325 mg once a day for DVT prophylaxis


Discussed icing and elevating techniques


With regards to right shoulder, she had noted pain in the shoulder after the 

initial fall that causing the fracture.  There are no acute osseous 

abnormalities noted on x-ray.  Patient likely has right shoulder impingement 

with AC joint arthritis, continue to monitor in the outpatient setting 


Patient will follow-up in the outpatient setting 2 weeks.  


Time with Patient: Less than 30

## 2017-08-24 NOTE — XR
EXAMINATION TYPE: XR shoulder complete RT

 

DATE OF EXAM: 8/24/2017

 

COMPARISON: NONE

 

HISTORY: Pain

 

TECHNIQUE: Three views are submitted.

 

FINDINGS:

The osseous structures are intact.  There is no acute fracture or dislocation. Mild arthropathy of th
e shoulder..  

 

IMPRESSION:

1. Mild AC joint arthropathy. Small spur extending from the acromion on the oblique view correlate fo
r rotator cuff disease..

## 2017-08-24 NOTE — P.CONS
History of Present Illness





- Reason for Consult


Consult date: 08/24/17


Medical Management


Requesting physician: Austin Olvera





- Chief Complaint


Right ankle fracture





- History of Present Illness


This is a 52-year-old  female.  Dr. Chris Carcamo was consulted for 

medical management.  Patient underwent initial internal fixation of right 

bimalleolar ankle fracture on 07/23/2017.  She was discharged and on 08/18/2017 

she fell off of a scooter at home and twisted her ankle.  She was assessed by 

Dr. Olvera in his office and was found to have increasing displacement of the 

medial malleolar fracture and loss of fixation.  On 08/23/2017 the patient 

underwent a revision open reduction internal fixation of the right medial 

malleolus.  She denies any chest pain or shortness of breath at this time.  Her 

right foot is in an Aircast and elevated on a pillow.  She complains of right 

shoulder pain which she states has been present since she fell from her 

scooter.  An x-ray was taken this morning and results are pending.  No obvious 

abnormalities noted upon examination.  Her vital signs this morning are stable 

with a blood pressure of 142/69 and she is afebrile with a temperature of 97.1.

  Yesterday evening she had an episode of hypotension with a pressure 85/43 

which may have been due to anesthesia and medications.  There is no lab work 

results at this morning except for a point-of-care glucose testing which showed 

a blood sugar of 89.  The patient has a history of fibromyalgia, reflux disease

, hypertension, and hypothyroidism.  She also has a long history of drug abuse.

  The patient states she is going home after discharge for 2 weeks so that she 

is able to follow up with Dr. Olvera and receive the appropriate postop care.  

She states after that she has signed herself into [facility] 

where she will be for 2 weeks for rehab.  She said her daughter and her family 

have disowned her because she is not going to rehab immediately following 

hospitalization, but patient states "I need to get better for myself first and 

maybe they will come around"








Review of Systems


GENERAL: Patient denies fever, chills, weight gain, or weight loss.


RESPIRATORY: Denies dyspnea, cough, sputum production, or hemoptysis.


CARDIOVASCULAR: Denies chest pain, pressure, palpitations, claudication, or 

arrhythmias.


GI: Denies abdominal pain, diarrhea, incontinence, heartburn, nausea, 

constipation, or blood in the stool.


: Denies urinary frequency, burning, dysuria, or cloudy urine.  Denies blood 

in the urine.


MUSCULOSKELETAL: Positive for right ankle pain and right shoulder pain.  

Positive for decreased range of motion in right shoulder








Past Medical History


Past Medical History: Eye Disorder, Fibromyalgia, Hypertension, Skin Disorder, 

Thyroid Disorder


Additional Past Medical History / Comment(s): Jac's Syndrome Rt eye, 

HYPOGLYCEMIA, hx ANEMIA, chronic MIGRAINES, pituitary tumor, boot on rt foot-

using knee scooter, hiatal hernia, IBS, pt states fell off knee scooter last 

night and hurt left foot-instructed pt to call Dr Olvera.


History of Any Multi-Drug Resistant Organisms: MRSA


Year Discovered:: 03/22/2017


MDRO Source:: right armpit


Past Surgical History: Bariatric Surgery, Cholecystectomy, Hernia Repair, 

Hysterectomy, Orthopedic Surgery, Tonsillectomy


Additional Past Surgical History / Comment(s): extra bone removed from rt ankle

, TRACHEOSTOMY/ later REMOVED, left foot bunionectomy, abdominoplasty, ORIF rt 

ankle 7/23/17


Past Anesthesia/Blood Transfusion Reactions: No Reported Reaction


Past Psychological History: ADD/ADHD, Anxiety, Depression


Additional Psychological History / Comment(s): Stopped tobacco smoking just in 

January of this year.  Medically disabled


Smoking Status: Current every day smoker


Past Alcohol Use History: None Reported


Additional Past Alcohol Use History / Comment(s): started smoking 1983, smokes 1

/2 PPD;


Past Drug Use History: None Reported





- Past Family History


  ** Mother


Family Medical History: Coronary Artery Disease (CAD)





  ** Father


Family Medical History: Cancer





Medications and Allergies


 Home Medications











 Medication  Instructions  Recorded  Confirmed  Type


 


Estrogens, Conjugated [Premarin] 0.3 mg PO DAILY 06/09/16 08/23/17 History


 


Baclofen 10 - 20 mg PO HS PRN 02/03/17 08/23/17 History


 


Fluticasone Nasal Spray [Flonase 2 spray EA NOSTRIL BID 02/03/17 08/23/17 

History





Nasal Spray]    


 


Levothyroxine Sodium [Synthroid] 50 mcg PO DAILY 02/03/17 08/23/17 History


 


Multivitamins, Thera [Multivitamin 1 tab PO DAILY 03/22/17 08/23/17 History





(formulary)]    


 


Cyanocobalamin [Vitamin B-12 1,000 mcg SQ QMONTH 07/21/17 08/23/17 History





Injection]    


 


Ibuprofen [Motrin] 800 mg PO TID PRN 07/21/17 08/23/17 History











 Allergies











Allergy/AdvReac Type Severity Reaction Status Date / Time


 


No Known Allergies Allergy   Verified 08/22/17 09:20














Physical Exam


Vitals: 


 Vital Signs











  Temp Pulse Resp BP Pulse Ox


 


 08/24/17 07:35  97.1 F L  84   142/69  96


 


 08/24/17 02:40  98.1 F  80  17  119/72  96


 


 08/23/17 20:15   86   85/43 


 


 08/23/17 20:00   87   101/73 


 


 08/23/17 19:45   88   104/67 


 


 08/23/17 19:30   95   99/78 


 


 08/23/17 19:15   88   110/69 


 


 08/23/17 19:00   86   107/64 


 


 08/23/17 18:45   91   108/70 


 


 08/23/17 18:30   104 H   106/73 


 


 08/23/17 18:15  98.0 F  104 H  16  94/71  94 L


 


 08/23/17 17:47   95  16  116/59  96


 


 08/23/17 17:32   97  16  116/58  96


 


 08/23/17 17:17   92  16  125/63  96


 


 08/23/17 17:02   96  16  123/56  96


 


 08/23/17 16:47   96  16  120/59  97


 


 08/23/17 16:32   98  16  125/59  94 L


 


 08/23/17 16:15   99  16  126/60  94 L


 


 08/23/17 16:14  98.5 F  110 H  18  134/63  95


 


 08/23/17 12:24  98.4 F  93  16  120/66  99








 Intake and Output











 08/23/17 08/24/17 08/24/17





 22:59 06:59 14:59


 


Intake Total 551 20 


 


Output Total 10  


 


Balance 541 20 


 


Intake:   


 


    


 


  Intake, IV Titration  20 





  Amount   


 


    Lactated Ringers 1,000 ml  20 





    @ 20 mls/hr IV .Q24H [facility]   





    Rx#:[id number]   


 


Output:   


 


  Estimated Blood Loss 10  


 


Other:   


 


  Voiding Method Bedside Commode  


 


  # Voids  2 


 


  Weight 86.183 kg  











GENERAL: Alert and oriented.  Appears in no acute distress.  Pleasant.


RESPIRATORY: Lungs clear bilaterally.  No use of accessory muscles.  Patient 

maintaining oxygen saturation greater than 92%.


CARDIOVASCULAR: S1 and S2 noted.  No murmurs auscultated.  No JVD noted.


EXTREMITIES: No edema noted.  Palpable pedal pulses +2.  Right ankle remains an 

Aircast.


ABDOMEN: No distention noted.  Abdomen soft and round.  Normal active bowel 

sounds auscultated 4 quadrants.  No pain or tenderness noted upon palpation.








Assessment and Plan


Plan: 


ASSESSMENT:





1.  Right bimalleolar ankle fracture, present on admission, due to a recent 

injury


2.  Status post revision open reduction internal fixation right medial malleolus


3.  Right shoulder pain, present on admission, shoulder x-ray results pending


4.  History of prescription drug abuse


5.  History of fibromyalgia


6.  Postoperative hypotension likely due to anesthesia, resolved








PLAN:





-Continue postop management per Dr. Olvera


-Encouraged patient to follow through with admission to [facility]


-DVT prophylaxis-initiate heparin subcu


-GI prophylaxis-patient receiving Protonix 40 mg daily


-Continue home medications


-Monitor vital signs and address as appropriate


-Monitor labs


-Follow up with Dr. Chris Carcamo following discharge.








The above impression and plan of care have been discussed and directed by 

signing physician. Indira Williamson, nurse practitioner, acting as scribe for 

signing physician.

## 2018-01-17 ENCOUNTER — HOSPITAL ENCOUNTER (OUTPATIENT)
Dept: HOSPITAL 47 - ORPAIN | Age: 53
Discharge: HOME | End: 2018-01-17
Attending: ANESTHESIOLOGY
Payer: COMMERCIAL

## 2018-01-17 VITALS — BODY MASS INDEX: 32.5 KG/M2

## 2018-01-17 VITALS — TEMPERATURE: 97.9 F | RESPIRATION RATE: 16 BRPM

## 2018-01-17 VITALS — DIASTOLIC BLOOD PRESSURE: 86 MMHG | SYSTOLIC BLOOD PRESSURE: 137 MMHG | HEART RATE: 70 BPM

## 2018-01-17 DIAGNOSIS — I10: ICD-10-CM

## 2018-01-17 DIAGNOSIS — E03.9: ICD-10-CM

## 2018-01-17 DIAGNOSIS — M47.812: Primary | ICD-10-CM

## 2018-01-17 PROCEDURE — 99152 MOD SED SAME PHYS/QHP 5/>YRS: CPT

## 2018-01-17 PROCEDURE — 64633 DESTROY CERV/THOR FACET JNT: CPT

## 2018-01-17 PROCEDURE — 64634 DESTROY C/TH FACET JNT ADDL: CPT

## 2018-01-17 NOTE — FL
Fluoroscopy

 

HISTORY: Pain

 

9 seconds fluoroscopy time supplied to the referring clinician.  4 intraoperative C-arm images docume
nt the procedure. See dictated report from anesthesia.

## 2018-01-17 NOTE — P.PCN
Date of Procedure: 01/17/18


Surgeon: Austen Harden


Pathology: none sent


Condition: stable


Disposition: PACU


Description of Procedure: 


PREOPERATIVE DIAGNOSIS: Cervical spondylosis without myelopathy and cervical 

facet arthropathy





POSTOPERATIVE DIAGNOSIS: Cervical spondylosis without myelopathy and cervical 

facet arthropathy





PROCEDURES: Radiofrequency thermocoagulation, C2, C3, C4 medial branch and left 

third occipital nerve, with fluoroscopic guidance, left.





ANESTHESIA: Local with 1% lidocaine; IV sedation with  fentanyl and Versed. 





EBL: Minimal





PROCEDURE INDICATION: The patient with neck pain secondary to cervical 

arthropathy who had more than 50% relief of her pain with previous diagnostic 

cervical medial branch block, presents for left cervical RFA today.  





PROCEDURE DESCRIPTION / TECHNIQUE: The patient was seen and identified in the 

preoperative area. Risks, benefits, complications, and alternatives were 

discussed with the patient including but not limited to bleeding, infection, 

nerve damage, allergic reactions to medications, and incomplete pain relief, 

the patient agreed to proceed with the procedure and signed the consent. IV was 

started. Vital signs remained stable throughout the procedure.





Patient was taken to the OR and time out was completed. The patient was placed 

in the prone position on the procedure table. A pillow was placed under the 

patients chest to increase the cervical interlaminar space. The cervical area 

was prepped and draped in the usual sterile fashion. Critical pause was taken. 

Vital signs were closely monitored during the procedure. Conscious sedation was 

used during the procedure to decrease patients anxiety. 





Using cross-table lateral fluoroscopy, the centroid of the trapezoid of  left C2

, C3, C4 were identified, marked, and localized with 1% lidocaine.  Subsequently

, a 21 gauge 100-mm radiofrequency cannula with a 5-mm active tip was advanced 

guided by fluoroscopy to the centroid of the trapezoids as above and to the C2-

C3 facet joint . Needle tip position was confirmed at the centroid of the 

trapezoids with anteroposterior fluoroscopy. Each site then underwent sensory 

testing at 50 Hz and 0 to 1 volt  and motor testing at 2 Hz and 0 to 3 volt 

with local stimulation, but no radicular symptoms down the arm. Thereafter all 

four sites underwent radiofrequency thermocoagulation  at 80 degrees Celsius 

for 90 seconds after injecting 0.5 ml of PF lidocaine 1%. After 

thermocoagulation, 1 ml of the block solution containing Decadron 10 mg and 3 

mL of preservative-free normal saline was injected at each level after negative 

aspiration of CSF and blood and with no paresthesias. Cannulas were retracted 

while injecting lidocaine 1% until the needle is out. Skin was cleansed and 

bandages were applied.





COMPLICATIONS: No acute complications.





COMMENTS:





DISPOSITION / PLANS: The patient was placed in a supine position and  

transferred to the recovery area in a stable condition for observation  and was 

discharged from the recovery room after meeting discharge criteria. Home 

discharge instructions given to the patient by the staff. The patient was 

reexamined prior to discharge and there were no issues. The patient will 

schedule a follow up right cervical RFA in 2-4 weeks.

## 2018-02-15 ENCOUNTER — HOSPITAL ENCOUNTER (OUTPATIENT)
Dept: HOSPITAL 47 - ORPAIN | Age: 53
Discharge: HOME | End: 2018-02-15
Payer: COMMERCIAL

## 2018-02-15 VITALS — TEMPERATURE: 97.4 F

## 2018-02-15 VITALS — RESPIRATION RATE: 18 BRPM | DIASTOLIC BLOOD PRESSURE: 74 MMHG | HEART RATE: 64 BPM | SYSTOLIC BLOOD PRESSURE: 125 MMHG

## 2018-02-15 DIAGNOSIS — E07.9: ICD-10-CM

## 2018-02-15 DIAGNOSIS — M47.816: ICD-10-CM

## 2018-02-15 DIAGNOSIS — M47.812: Primary | ICD-10-CM

## 2018-02-15 DIAGNOSIS — D49.7: ICD-10-CM

## 2018-02-15 DIAGNOSIS — G90.2: ICD-10-CM

## 2018-02-15 DIAGNOSIS — I10: ICD-10-CM

## 2018-02-15 PROCEDURE — 99152 MOD SED SAME PHYS/QHP 5/>YRS: CPT

## 2018-02-15 PROCEDURE — 64633 DESTROY CERV/THOR FACET JNT: CPT

## 2018-02-15 PROCEDURE — 64634 DESTROY C/TH FACET JNT ADDL: CPT

## 2018-02-15 PROCEDURE — 99153 MOD SED SAME PHYS/QHP EA: CPT

## 2018-02-15 NOTE — FL
EXAMINATION TYPE: FL guided pain mgmt statistic

 

DATE OF EXAM: 2/15/2018

 

HISTORY: Flouroscopy  time

 

26 seconds of fluoroscopy provided. 

 

IMPRESSION:

1. Fluoroscopy time.

## 2018-02-15 NOTE — P.PCN
Date of Procedure: 02/15/18


Procedure(s) Performed: 





PREOPERATIVE DIAGNOSIS: 1-Cervical spondylosis with  Facet Arthropathy without 

myelopathy.   2-lumbar spondylosis with lumbar facet arthropathy





POSTOPERATIVE DIAGNOSIS: 1-Cervical spondylosis with  Facet Arthropathy without 

myelopathy.   2-lumbar spondylosis with lumbar facet arthropathy








PROCEDURES: Radiofrequency thermocoagulation, right  C2-3, C3-4, C4-5,  medial 

branch with Fluroscopy Guidence





ANESTHESIA: Local with 1% lidocaine 4 ml , moderate sedation with  fentanyl  

100   micrograms and Versed.2  mg  





EBL: Minimal





PROCEDURE INDICATION: The patient with neck pain secondary to cervical 

arthropathy who had more than 50% relief of her pain with previous diagnostic 

cervical medial branch block. 





PROCEDURE DESCRIPTION / TECHNIQUE: The patient was seen and identified in the 

preoperative area. Risks, benefits, complications, and alternatives were 

discussed with the patient, the patient agreed to proceed with the procedure 

and signed the consent. IV was started. Vital signs remained stable throughout 

the procedure.





Patient was taken to the OR and time out was completed. The patient was placed 

in the prone position on the procedure table. A pillow was placed under the 

patients chest to increase the cervical interlaminar space. The cervical area 

was prepped and draped in the usual sterile fashion. Critical pause was taken. 

Vital signs were closely monitored during the procedure. Conscious sedation was 

used during the procedure to decrease patients anxiety. 





Using cross-table lateral fluoroscopy, the centroid of the trapezoid of  Right  

C2 ,C3, C4,  were identified, marked, and localized with 1% lidocaine.  

Subsequently, a 20 [id number] radiofrequency cannula with a 10-mm active tip 

was advanced guided by fluoroscopy to the centroid of the trapezoid of Right C2

  C3, C4,  . Needle tip position was confirmed at the centroid of the 

trapezoids of right  C2  ,C3, C4,  with anteroposterior fluoroscopy. Each site 

then underwent sensory testing at 50 Hz and 0 to 1 volt  and motor testing at 2 

Hz and 0 to 3 volt with local stimulation, but no radicular symptoms down the 

arm. Thereafter right  C2  ,C3, C4 sites underwent radiofrequency 

thermocoagulation  at 80 degrees celsius for 90 seconds after injecting 0.5 ml 

of PF lidocaine 1%. After thermocoagulation, 1 ml of bupivacaine 0.5 % injected 

, after negative aspiration of CSF and blood and with no paresthesias. Cannulas 

were retracted while injecting lidocaine 1% until the needle is out. Skin was 

cleansed and bandages were applied.





COMPLICATIONS: No acute complications.





COMMENTS:





DISPOSITION / PLANS: The patient was placed in a supine position and  

transferred to the recovery area in a stable condition for observation  and was 

discharged from the recovery room after meeting discharge criteria. Home 

discharge instructions given to the patient by the staff. The patient was 

reexamined prior to discharge. The patient will schedule a follow up in the 

clinic in 2-4 weeks.


note= patient complaining of severe low back pain and we have done diagnostic 

medial branch block in the past 2 and she had good pain relief , and currently 

she is complaining of increased low back pain in the lumbar area patient Is 

scheduled to have radiofrequency ablation of the medial branch lumbar area in 

the near future

## 2018-03-22 ENCOUNTER — HOSPITAL ENCOUNTER (OUTPATIENT)
Dept: HOSPITAL 47 - PNWHC3 | Age: 53
Discharge: HOME | End: 2018-03-22
Payer: COMMERCIAL

## 2018-03-22 VITALS — HEART RATE: 91 BPM | DIASTOLIC BLOOD PRESSURE: 85 MMHG | SYSTOLIC BLOOD PRESSURE: 121 MMHG | RESPIRATION RATE: 16 BRPM

## 2018-03-22 DIAGNOSIS — M25.511: ICD-10-CM

## 2018-03-22 DIAGNOSIS — M47.812: Primary | ICD-10-CM

## 2018-03-22 DIAGNOSIS — M47.816: ICD-10-CM

## 2018-03-22 PROCEDURE — 99211 OFF/OP EST MAY X REQ PHY/QHP: CPT

## 2018-03-22 NOTE — P.PN
Subjective


Progress Note Date: 03/22/18


This is a follow-up visit for this 52 years old female with a chronic history 

of severe neck pain and severe low back pain, he is diagnosed with cervical 

spondylosis with cervical facet arthropathy without myelopathy, we helped and 

radiofrequency ablation of the medial branch cervical area, and her neck pain 

improved significantly, and in the past we have done diagnostic medial branch 

block lumbar area at L3 4 /L45/L5-S1, 2 , and she reported that she gets more 

than 50% decrease in her low back pain after the diagnostic medial branch block

, patient currently complaining of severe right shoulder pain and severe left 

knee pain, which is increased with any shoulder activity and also the knee pain 

increases with any flexion and extension of the knee and any walking activity, 

she denies any fever or night sweats, she denies any motor or sensory deficit, 

but she reported that she is currently on IV antibiotic for right foot infection

, she is being on chronic IV antibiotic treatment for the infection of the 

right foot





Objective





- Vital Signs


Vital signs: 


 Vital Signs











Temp      


 


Pulse  91   03/22/18 12:46


 


Resp  16   03/22/18 12:46


 


BP  121/85   03/22/18 12:46


 


Pulse Ox  99   03/22/18 12:46








 Intake & Output











 03/21/18 03/22/18 03/22/18





 18:59 06:59 18:59


 


Weight   90.718 kg














- Exam


    


  Physical Examinations  :


    1-Constitutiona             : Cooperative , not in acute distress .


     2-HEENT                       :  nech ;  supple ,  no Lymphadenopathy  , 

normal  thyroid  size .


                                                  eyes  :  no ptosis , no 

icterus,  no photophobia .  


                                                  ENT  : normal    of hearing  

, normal  oropharynx     , no Thrush .  


    3- Respiratory               : Chest clear to auscultations Bilaterally  ,  

no wheezing   , no Rhonchi   .  


    4- Cardiovascular         : regular rate and rhythem , S1 ,  S2  ,   no  S3 

,  no  S4.


    5- Gastrointestinal       :  abdomen soft  no tenderness , bowel sounds 

positive all four quadrents   , no organomegally  .


    6- Genitourinary           :   Defferred .                                 

                                                                               

                                                                               

                                                                               

                                                                               

                       


    7- neurologic               :   Cranial nerve II   to  XII  intact ,  no   

focal neurological deffecit  .


    8-psychatric                   : alert ,  oriented  X 3  ,   appropriate 

affect   , intact judgment  and insight  .  


    9-Lymphatic                :    no Lymphadenopathy .


    10- musculoskeltal       :     


           cervical spine = motor  stregnth in the deltoid and biceps,   


                                           motor   stregnth biceps and the 

wrist extensors (C6)                                                           

                                                                               

                                                                               

                                                                               

                                                                               

                                                                               

                                                            .                  

                       motor stregnth in the triceps muscle . 


                                          deep tendon reflexes  normal at the 

biceps ,  normal at Brachioradialis , normal at the Triceps                    

                                                                               

                                                                               

                                                                               

                                                                               

                                                                               

                   


                                          Severe right shoulder pain with 

flexion and extension of the right shoulder                                    

                                                                               

                                                                               

                                                                               

                                               


           Lumber spine =  normal moter stegnth lower extremities ,thigh and 

legs  .5/5 


                                           deep tendon reflexes :   normal  

Knee Jerk    , normal   ankle Jerk  .


                                           lumber facet Loading Test  positive 


                                           strait leg raising test   positive 

at  30  degree Right , positve at 30 degree Left 


                                           Fabere test positive Right     and  

positive  Left 


      Flexion and extension of the left knee joint and associated with severe 

pain





Assessment and Plan


Plan: 


Assessment and plan=1-lumbar spondylosis patient had a good result after 2 

diagnostic medial branch blocks done previously she will be good candidate to 

have


                                   radiofrequency ablation of the medial branch 

lumbar area at L3-4/L4 5/L5-S1 ( NO STEROID ).


                               2-cervical spondylosis status post RFA of the 

medial cervical area , pain improved after the radiofrequency ablation


                               3-right shoulder arthralgia, left knee arthralgia


                               Patient could benefit from Lidoderm patch to be 

applied to the right shoulder and left knee area 12 hours on 12 hours off 

dispense 60 


                               patch with 5 refills, also patient could benefit 

from Lyrica 200 mg 3 times a day dispense 90 with 5 refills


Time with Patient: Less than 30

## 2018-03-26 ENCOUNTER — HOSPITAL ENCOUNTER (OUTPATIENT)
Dept: HOSPITAL 47 - ORPAIN | Age: 53
Discharge: HOME | End: 2018-03-26
Attending: ANESTHESIOLOGY
Payer: COMMERCIAL

## 2018-03-26 VITALS — SYSTOLIC BLOOD PRESSURE: 137 MMHG | HEART RATE: 89 BPM | RESPIRATION RATE: 18 BRPM | DIASTOLIC BLOOD PRESSURE: 90 MMHG

## 2018-03-26 VITALS — BODY MASS INDEX: 34.3 KG/M2

## 2018-03-26 VITALS — TEMPERATURE: 98.1 F

## 2018-03-26 DIAGNOSIS — M47.816: Primary | ICD-10-CM

## 2018-03-26 DIAGNOSIS — Z95.828: ICD-10-CM

## 2018-03-26 DIAGNOSIS — Z79.2: ICD-10-CM

## 2018-03-26 DIAGNOSIS — E66.9: ICD-10-CM

## 2018-03-26 DIAGNOSIS — L08.9: ICD-10-CM

## 2018-03-26 PROCEDURE — 64636 DESTROY L/S FACET JNT ADDL: CPT

## 2018-03-26 PROCEDURE — 64635 DESTROY LUMB/SAC FACET JNT: CPT

## 2018-03-26 PROCEDURE — 99152 MOD SED SAME PHYS/QHP 5/>YRS: CPT

## 2018-03-26 NOTE — P.PCN
Date of Procedure: 03/26/18


Surgeon: Loly Carbone


Description of Procedure: 


PREOPERATIVE DIAGNOSIS: Lumbar spondylosis without myelopathy,  obesity


POSTOPERATIVE DIAGNOSIS: Lumbar spondylosis without myelopathy, obesity


PROCEDURES :  Radiofrequency thermocoagulation,L2-4, L3-L4, L4-L5, and L5-S1  

medial branch, with fluoroscopic guidance





ANESTHESIA:  IV moderate conscious sedation with versed and fentanyl and local 

infiltration with lidocaine 1% 5 ml 





EBL: Minimal





PROCEDURE INDICATION: The patient with low back pain secondary to lumbar facet  

arthropathy who had more than 50% relief of her pain with previous diagnostic 

lumbar medial branch block with bupivacaine. 





PROCEDURE DESCRIPTION / TECHNIQUE: The patient was seen and identified in the 

preoperative area. Risks, benefits, complications, including but not limited to 

risk of infection ,bleeding , allergic reactions to the medications and no 

complete pain relief , and alternatives were discussed with the patient, the 

patient agreed to proceed with the procedure and signed the consent. IV was 

started. Vital signs remained stable throughout the procedure.





Patient was taken to the OR and time out was completed. The patient was placed 

in the prone position on the procedure table. The lumber  area was prepped and 

draped in the usual sterile fashion. . Vital signs were closely monitored 

during the procedure .IV sedation was used during the procedure to decrease 

patients anxiety. 





The target points were identified as follows: For the L5-S1 level which 

corresponds to the dorsal ramus of L5 the target point was at the superior 

medial aspect of the sacral ala on the left side of the spine on the AP view of 

fluoroscopy and for the L2, L3, and L4 medial branches the target points were 

at the connection between the transverse process and the superior articular 

process of L3, L4, and L5 vertebra respectively on the left oblique view of 

fluoroscopy. skin was marked, and localized with 1% lidocaineat these points.  

Subsequently, an 18  [id number] radiofrequency needles with a 10-mm curved  

active tips were advanced guided by fluoroscopy to each of the target points 

mentioned above in a superior medial direction to get the active tips as 

parallel as possible to the medial branches tracks.  AP, oblique, and lateral 

views of fluoroscopy were used to verify needle tips position.  Each level then 

underwent  motor testing at 2.5  Hz and 0 to 3 volt with local stimulation, but 

no radicular symptoms down the legs. Thereafter,  radiofrequency 

thermocoagulation  at 80 degrees celsius for 90 seconds after injecting 1 ml of 

PF Marcaine 0.5%(4 mls).  No steroids were used.                           





At the end of the procedure, the skin was cleansed and bandages were applied.





Comments: The patient has a PICC line in the left arm for chronic antibiotic 

treatment due to chronic infection in the right foot.  The patient denies any 

history of diabetes however.





COMPLICATIONS: No acute complications.





DISPOSITION / PLANS: The patient was placed in a supine position and  

transferred to the recovery area in a stable condition for observation  and was 

discharged from the recovery room after meeting discharge criteria. Home 

discharge instructions given to the patient by the staff. The patient was 

reexamined prior to discharge. The patient will schedule a follow up in the 

clinic in 2-4 weeks.

## 2018-03-26 NOTE — FL
Fluoroscopy

 

HISTORY: Pain

 

31 seconds fluoroscopy time supplied to the referring clinician.  3 intraoperative C-arm images docum
ent the procedure. See dictated report from anesthesia.

## 2018-04-23 ENCOUNTER — HOSPITAL ENCOUNTER (OUTPATIENT)
Dept: HOSPITAL 47 - ORPAIN | Age: 53
Discharge: HOME | End: 2018-04-23
Payer: COMMERCIAL

## 2018-04-23 VITALS — RESPIRATION RATE: 16 BRPM | TEMPERATURE: 97.7 F

## 2018-04-23 VITALS — SYSTOLIC BLOOD PRESSURE: 108 MMHG | HEART RATE: 96 BPM | DIASTOLIC BLOOD PRESSURE: 73 MMHG

## 2018-04-23 VITALS — BODY MASS INDEX: 37.8 KG/M2

## 2018-04-23 DIAGNOSIS — M79.7: ICD-10-CM

## 2018-04-23 DIAGNOSIS — L08.9: ICD-10-CM

## 2018-04-23 DIAGNOSIS — M47.816: Primary | ICD-10-CM

## 2018-04-23 DIAGNOSIS — Z79.2: ICD-10-CM

## 2018-04-23 DIAGNOSIS — I10: ICD-10-CM

## 2018-04-23 PROCEDURE — 99152 MOD SED SAME PHYS/QHP 5/>YRS: CPT

## 2018-04-23 PROCEDURE — 64636 DESTROY L/S FACET JNT ADDL: CPT

## 2018-04-23 PROCEDURE — 64635 DESTROY LUMB/SAC FACET JNT: CPT

## 2018-04-23 NOTE — P.PCN
Date of Procedure: 04/23/18


Procedure(s) Performed: 





PREOPERATIVE DIAGNOSIS: 1-Lumbar Spondylosis with  Facet Arthropathy without 

myelopathy.  





POSTOPERATIVE DIAGNOSIS: 1- Lumbar Spondylosis with Facet Arthropathy without 

myelopathy.  





PROCEDURES :  Right  Radiofrequency thermocoagulation, L3-L4, L4-L5, and L5-S1  

medial branch, with fluoroscopic guidance





ANESTHESIA:  Moderate sedation with intravenous versed  2 mg and fentaneyl  100 

mcg and local infiltration with lidocaine 1% 3ml 





EBL: Minimal 


PROCEDURE INDICATION: The patient with low back pain secondary to lumbar facet  

arthropathy who had more than 50% relief of her pain with previous diagnostic 

lumbar medial branch block with bupivacaine. 





PROCEDURE DESCRIPTION / TECHNIQUE: The patient was seen and identified in the 

preoperative area. Risks, benefits, complications, including but not limited to 

risk of infection ,bleeding , allergic reactions to the medications and no 

complete pain releife , and alternatives were discussed with the patient, the 

patient agreed to proceed with the procedure and signed the consent. IV was 

started. Vital signs remained stable throughout the procedure.





Patient was taken to the OR and time out was completed. The patient was placed 

in the prone position on the procedure table. The lumber  area was prepped and 

draped in the usual sterile fashion. . Vital signs were closely monitored 

during the procedure .IV sedation was used during the procedure to decrease 

patients anxiety. 





Using AP and then oblique fluoroscopy, the ``eye of the Jax dog 

corresponding to the connection between the superior and transverse articular 

processes of  right L3, L4, and L5 were identified, marked, and localized with 1

% lidocaine.  Subsequently, a 18  [id number] radiofrequency cannula with a 10-

mm active tip was advanced guided by fluoroscopy to each of the ``eyes of the 

Jax dog at right L3, L4, and L5. Each site then underwent sensory testing 

at 50 Hz and 0 to 1 volt  and motor testing at 2.5  Hz and 0 to 3 volt with 

local stimulation, but no radicular symptoms down the legs. Thereafter the 

right L3-4, L4-5, and L5-S1 sites underwent radiofrequency thermocoagulation  

at 80 degrees celsius for 90 seconds after injecting 0.5 ml of PF lidocaine 1%.

   then  After the  thermocoagulation done , 1  ml of marcain 0.5%  was 

injected at the right L3-4 , L4-5 , and L5-S1, levels after negative aspiration 

of CSF and blood and with no paresthesias. Cannulas were retracted while 

injecting lidocaine 1% until the needle is out. 


At the end of the procedure, the skin was cleansed and bandages were applied.


NO Steroid was used today .





COMPLICATIONS: No acute complications.





DISPOSITION / PLANS: The patient was placed in a supine position and  

transferred to the recovery area in a stable condition for observation  and was 

discharged from the recovery room after meeting discharge criteria. Home 

discharge instructions given to the patient by the staff. The patient was 

reexamined prior to discharge. The patient will schedule a follow up in the 

clinic in 2-4 weeks.

## 2018-04-23 NOTE — FL
Fluoroscopy

 

HISTORY: Pain

 

11 seconds fluoroscopy time supplied to the referring clinician.  3 intraoperative C-arm images docum
ent the procedure. See dictated report from anesthesia.

## 2018-05-17 ENCOUNTER — HOSPITAL ENCOUNTER (OUTPATIENT)
Dept: HOSPITAL 47 - PNWHC3 | Age: 53
Discharge: HOME | End: 2018-05-17
Payer: COMMERCIAL

## 2018-05-17 VITALS — SYSTOLIC BLOOD PRESSURE: 115 MMHG | RESPIRATION RATE: 16 BRPM | DIASTOLIC BLOOD PRESSURE: 79 MMHG | HEART RATE: 82 BPM

## 2018-05-17 DIAGNOSIS — Z79.899: ICD-10-CM

## 2018-05-17 DIAGNOSIS — Z87.891: ICD-10-CM

## 2018-05-17 DIAGNOSIS — Z79.1: ICD-10-CM

## 2018-05-17 DIAGNOSIS — Z98.890: ICD-10-CM

## 2018-05-17 DIAGNOSIS — M75.101: ICD-10-CM

## 2018-05-17 DIAGNOSIS — M47.816: Primary | ICD-10-CM

## 2018-05-17 DIAGNOSIS — M47.812: ICD-10-CM

## 2018-05-17 DIAGNOSIS — S92.901D: ICD-10-CM

## 2018-05-17 PROCEDURE — 99211 OFF/OP EST MAY X REQ PHY/QHP: CPT

## 2018-05-17 NOTE — P.PAINPG
Subjective


Progress Note Date: 05/17/18


Principal diagnosis: 


Lumbar spondylosis


Cervical spondylosis


Right lumbar rotator cuff tear


Right foot fractures and orthopedic issues.





This is a very pleasant 52-year-old woman who is well-known to our clinic.  She 

is undergone radio frequency ablation of both the lumbar and cervical regions.  

She reports that both these have been helpful.  She does report some 

sensitivity in her right cervical region however admits that this is getting 

better as time has gone on.  It is been about 4 months since she had a radio 

frequency ablation.  She complains of pain in her right foot.  She's had 4 

different operations.  She is looking at having another operation as some of 

the hardware in her foot has broken.  She is following up with orthopedist for 

this.  She also reports significant pain in her right shoulder.  She's been 

diagnosed with incomplete rotator cuff tear.  She has been going to physical 

therapy but reports she is not able to do much secondary to severe pain with 

this.  She is requesting interventional procedures for her shoulder pain. 








Objective





- Vital Signs


Vital signs: 


 Vital Signs











Temp      


 


Pulse  82   05/17/18 14:43


 


Resp  16   05/17/18 14:43


 


BP  115/79   05/17/18 14:43


 


Pulse Ox      








 Intake & Output











 05/16/18 05/17/18 05/17/18





 18:59 06:59 18:59


 


Weight   99.79 kg














- Exam


Gen:  WDWN, AAOx3, NAD


HEENT:  NCAT, EOMI, hearing grossly normal


Pulm:  resp unlabored


Abd:  soft, NT, ND





Neck:  supple, trachea midline





ROM in flexion cervical spine: Normal range of motion


ROM in extension cervical spine: range of motion


Cervical paravertebral tenderness: None


Cervical Facet tenderness: None





She has an orthotic on place on her right foot.





Neuro:   muscle strength lower extremities patient has good muscle strength in 

her upper extremities.  She is limited due to severe pain with external 

rotation of the shoulder as well as adduction of her shoulder








Assessment and Plan


(1) Cervical spondylosis


Narrative/Plan: 


Patient will continue to take Lyrica.


Patient will follow up with her orthopedic with regard to her right foot.


We will schedule the patient for right rotator cuff injection.


I've counseled her on importance of weight loss and the avoidance of 

inflammatory foods.


We'll continue to work with her psychologist and psychiatrist.


Current Visit: Yes   Status: Acute   Code(s): M47.812 - SPONDYLOSIS W/O 

MYELOPATHY OR RADICULOPATHY, CERVICAL REGION   SNOMED Code(s): 184554703


   





(2) Lumbar spondylosis


Current Visit: Yes   Status: Acute   Code(s): M47.816 - SPONDYLOSIS W/O 

MYELOPATHY OR RADICULOPATHY, LUMBAR REGION   SNOMED Code(s): 911668851


   





(3) Right rotator cuff tear


Current Visit: Yes   Status: Acute   Code(s): M75.101 - UNSP ROTATR-CUFF TEAR/

RUPTR OF RIGHT SHOULDER, NOT TRAUMA   SNOMED Code(s): 583726793


   





PQRS Measure Charge Sheet


Measure #130: Documentation of Current Meds in Medical Chart: Patient's 

medications documented in chart


Measure #226: Tobacco Use: Screen & Cessation Intervention: Pt not a tobacco 

user


Measure #111: Pneumonia Vaccination: Pneumococcal vaccine NOT administered or 

previously given


Measure #47: Advance Care Plan: Advance care planning discussed & documented, 

pt chose/unable to give


Measure #412: Opioid Treatment Agreement: No documentation of signed opioid 

treatment agreement


Measure #408: Opioid Therapy Follow-up Evaluation: Patient had NO f/u eval 

minimum every 3 months during opioid therapy


Measure #317: Preventitive Care & Scrn High Bld Press & F/U: Pre-hypertensive 

or hypertensive BP documented, pt will f/u with PCP


Measure #128: Body Mass Index (BMI) Screening & Follow-up: BMI documented ABOVE 

normal parameters - f/u documented


Measure #131: Pain Assessment & Follow-up: Pain positive & plan documented


Measure #431: Unhealthy Alcohol Use Preventative Care & Scrn: Patient 

identified as unhealthy alcohol user; counseling given


PQRS Narrative: 


 





Smoking Status                   Former smoker


Do You Want the Pneumonia        No


Vaccine AT THIS TIME?            


Blood Pressure                   115/79


Pain Intensity [Bilateral        2


Lower Back]                      


Scale Used                       Numeric (1 - 10)


Hx Alcohol Use (MH)              No








Home Medications: 


Ambulatory Orders





Estrogens, Conjugated [Premarin] 0.3 mg PO DAILY 06/09/16 


Baclofen 10 - 20 mg PO HS PRN 02/03/17 


Fluticasone Nasal Spray [Flonase Nasal Spray] 2 spray EA NOSTRIL BID 02/03/17 


Levothyroxine Sodium [Synthroid] 50 mcg PO DAILY 02/03/17 


Multivitamins, Thera [Multivitamin (formulary)] 1 tab PO DAILY 03/22/17 


Cyanocobalamin [Vitamin B-12 Injection] 1,000 mcg SQ QMONTH 07/21/17 


Ibuprofen [Motrin] 800 mg PO TID PRN 07/21/17 


Furosemide [Lasix] 40 mg PO DAILY PRN #0 07/25/17 


Lisinopril-Hctz 10-12.5 mg [Zestoretic 10-12.5] 1 tab PO DAILY #30 tab 07/25/17 


Ondansetron [Zofran] 4 mg PO Q6HR PRN  tab 07/25/17 


Pantoprazole [Protonix] 40 mg PO AC-BRKFST  tab 07/25/17 


Potassium Chloride ER [K-Dur 20] 20 meq PO DAILY PRN #0 07/25/17 


Venlafaxine HCl ER [Effexor XR] 225 mg PO DAILY  cap 07/25/17 


Pregabalin [Lyrica] 200 mg PO Q8HR 03/22/18 


ARIPiprazole [Abilify] 10 mg PO DAILY 03/23/18 


Pregabalin [Lyrica] 200 mg PO Q8HR 05/17/18 











Controlled Substance Measures





- Controlled Substance Measures


Is patient prescribed a controlled substance at discharge?: No


If prescribed controlled substance>3 days was MAPS reviewed?: No


When asked, does pt state using other controlled substances?: No

## 2018-05-30 ENCOUNTER — HOSPITAL ENCOUNTER (OUTPATIENT)
Dept: HOSPITAL 47 - ORPAIN | Age: 53
Discharge: HOME | End: 2018-05-30
Attending: ANESTHESIOLOGY
Payer: COMMERCIAL

## 2018-05-30 VITALS — SYSTOLIC BLOOD PRESSURE: 122 MMHG | DIASTOLIC BLOOD PRESSURE: 86 MMHG | HEART RATE: 85 BPM

## 2018-05-30 VITALS — BODY MASS INDEX: 38.6 KG/M2

## 2018-05-30 VITALS — RESPIRATION RATE: 16 BRPM | TEMPERATURE: 97.8 F

## 2018-05-30 DIAGNOSIS — M25.511: Primary | ICD-10-CM

## 2018-05-30 PROCEDURE — 99152 MOD SED SAME PHYS/QHP 5/>YRS: CPT

## 2018-05-30 PROCEDURE — 20610 DRAIN/INJ JOINT/BURSA W/O US: CPT

## 2018-06-08 NOTE — P.PCN
Date of Procedure: 05/30/18


Preoperative Diagnosis: 


Right shoulder pain


Right rotator cuff tear


Postoperative Diagnosis: 


Same as above


Procedure(s) Performed: 


Right shoulder joint steroid injection under ultrasound guidance


Anesthesia: other (IV conscious sedation with Versed and fentanyl)


Surgeon: Loly Carbone


Pathology: none sent


Condition: stable


Disposition: PACU


Description of Procedure: 


The patient was seen in the preoperative holding area consent was obtained and 

was brought into the procedure room and placed in the supine position.  The 

right shoulder skin was prepped anteriorly with DuraPrep and draped in a 

sterile manner.  I then used 22-gauge 3-1/2 inch Quincke spinal needle under 

ultrasound guidance to go through the skin over the anterior right shoulder and 

into the echogenic space right above the humerus neck.  And after negative 

aspiration I injected 40 mg of Kenalog +4 MLS of Marcaine 0.25%.  Patient 

tolerated procedure well.

## 2018-07-17 ENCOUNTER — HOSPITAL ENCOUNTER (OUTPATIENT)
Dept: HOSPITAL 47 - PNWHC3 | Age: 53
Discharge: HOME | End: 2018-07-17
Attending: ANESTHESIOLOGY
Payer: COMMERCIAL

## 2018-07-17 VITALS — SYSTOLIC BLOOD PRESSURE: 108 MMHG | HEART RATE: 18 BPM | RESPIRATION RATE: 18 BRPM | DIASTOLIC BLOOD PRESSURE: 63 MMHG

## 2018-07-17 DIAGNOSIS — M47.816: ICD-10-CM

## 2018-07-17 DIAGNOSIS — G89.29: Primary | ICD-10-CM

## 2018-07-17 DIAGNOSIS — M47.812: ICD-10-CM

## 2018-07-17 DIAGNOSIS — M54.5: ICD-10-CM

## 2018-07-17 DIAGNOSIS — S82.201A: ICD-10-CM

## 2018-07-17 DIAGNOSIS — M25.50: ICD-10-CM

## 2018-07-17 PROCEDURE — 99211 OFF/OP EST MAY X REQ PHY/QHP: CPT

## 2018-07-17 NOTE — P.PN
Subjective


Progress Note Date: 07/17/18


Principal diagnosis: 


Lumbar spondylosis without myelopathy


Cervical spondylosis without myelopathy


Multiple joint pain


Right tibial fracture








This is a 53-year-old lady with recent right tibial fracture and a right foot 

is still in a medical boot.  She has chronic neck and lower back pain due to 

spondylosis without myelopathy.  She also had right shoulder pain which 

improved significantly after the last steroid injection in the right shoulder 

however she fell on her left side and right now she has severe left shoulder 

pain and inability to elevate her left arm and also severe left knee pain.  The 

patient does not use opioids for her pain anymore.  She alternates Motrin and 

Tylenol.





Objective





- Vital Signs


Vital signs: 


 Vital Signs











Temp      


 


Pulse  18 L  07/17/18 14:45


 


Resp  18   07/17/18 14:45


 


BP  108/63   07/17/18 14:45


 


Pulse Ox  99   07/17/18 14:45








 Intake & Output











 07/16/18 07/17/18 07/17/18





 18:59 06:59 18:59


 


Weight   99.79 kg














- EENT


Eyes: Present: PERRLA





- Respiratory


Respiratory: bilateral: CTA





- Cardiovascular


Rhythm: regular


Heart sounds: normal: S1, S2





- Integumentary


Integumentary: Absent: calor, cellulitis, cyanotic, decreased turgor, flushed, 

jaundiced, normal, normal turgor, pale, rash, ulcer





- Neurologic


Neurologic: Present: CNII-XII intact





- Musculoskeletal


Musculoskeletal Comment(s): 





Decreased range of motion of the left shoulder to less than 90 for abduction.  

Positive tenderness around the left shoulder joint anteriorly and posteriorly.


Tenderness and decreased range of motion of the left knee.  The patient has her 

left knee in a brace.  Her right foot in a medical boot.





- Psychiatric


Psychiatric: Present: A&O x's 3, appropriate affect





Assessment and Plan


Plan: 





This is a 53-year-old female with chronic history of neck and lower back pain 

plus multi joint pain with recent severe pain in the left shoulder and left 

knee after a fall.  I will schedule the patient to have left shoulder and left 

knee joints steroid injection, however I told the patient that we should hold 

off on steroid injections for a while after this injection to avoid the 

complications of high dose of steroids, especially that she had multiple 

steroid injection this year.

## 2018-08-02 ENCOUNTER — HOSPITAL ENCOUNTER (OUTPATIENT)
Dept: HOSPITAL 47 - ORPAIN | Age: 53
Discharge: HOME | End: 2018-08-02
Attending: ANESTHESIOLOGY
Payer: COMMERCIAL

## 2018-08-02 VITALS — DIASTOLIC BLOOD PRESSURE: 49 MMHG | SYSTOLIC BLOOD PRESSURE: 79 MMHG | RESPIRATION RATE: 16 BRPM | HEART RATE: 66 BPM

## 2018-08-02 VITALS — BODY MASS INDEX: 38.6 KG/M2

## 2018-08-02 VITALS — TEMPERATURE: 97.9 F

## 2018-08-02 DIAGNOSIS — I10: ICD-10-CM

## 2018-08-02 DIAGNOSIS — E66.01: ICD-10-CM

## 2018-08-02 DIAGNOSIS — M19.012: Primary | ICD-10-CM

## 2018-08-02 DIAGNOSIS — M17.12: ICD-10-CM

## 2018-08-02 PROCEDURE — 20610 DRAIN/INJ JOINT/BURSA W/O US: CPT

## 2018-08-02 PROCEDURE — 99152 MOD SED SAME PHYS/QHP 5/>YRS: CPT

## 2018-08-02 NOTE — P.PCN
Date of Procedure: 08/02/18


Preoperative Diagnosis: 


Osteoarthritis in multiple joints including left shoulder and left knee


Postoperative Diagnosis: 


Same as above


Procedure(s) Performed: 


Left shoulder joint steroid injection under ultrasound guidance in the anterior 

approach


Left knee joint steroid injection in the suprapatellar approach under 

ultrasound guidance


Anesthesia: other (Local with IV sedation with Versed and fentanyl)


Surgeon: Loly Carbone


Pathology: none sent


Condition: stable


Disposition: PACU


Description of Procedure: 


The patient was seen in the preop holding area consent was obtained then she 

was brought into the procedure room and placed in the semi-recumbent position.  

Skin was prepped with the left shoulder and left knee with ChloraPrep.  

Lidocaine was used to numb the skin and the needle entry site.  I used a 22-

gauge 3-1/2 inch Quincke spinal needle for this procedure.  20 mg of Kenalog +4 

MLS of ropivacaine 0.5% were injected in each joint after recognizing the 

target points with ultrasound.  Patient tolerated procedure well.

## 2018-08-22 NOTE — P.PN
Subjective


Principal diagnosis: 


Abscess right axilla





Patient is a 51-year-old  female with medical history significant for 

recent hospitalization for severe sepsis secondary to right axillary abscess 

status post incision and drainage on 02/05/2017 by Dr. Aden with fluid cultures 

positive for MRSA discharged home on 02/08/2017 with a PICC line and IV Teraflo 

for 24 weeks with local wound care per Dr. Heard from infectious disease 

service.  Patient presented to the emergency department with multiple 

complaints including on and off fevers, chills, swelling of her lower 

extremities, and increased incisional pain.  CT of chest upon admission showed 

interval improvement in the right axillary cellulitis/phlegmon pattern when 

compared to previous exam on February 4 with no drainable fluid collection 

identified.  Patient was admitted to the fourth floor and consult was requested 

for Dr. Abdul from infectious disease service per patient request. 





Upon examination, patient reports right axilla pain has improved. Patient 

complains of leg swelling but denies calf pain.  Patient denies chills, nausea, 

vomiting, shortness of breath, chest pain, or abdominal pain.  Patient denies 

patient denies urinary urgency, dysuria, or hematuria. Patient remains 

afebrile. Patient continues on IV Teflaro. 





Objective





- Vital Signs


Vital signs: 


 Vital Signs











Temp  96.9 F L  03/06/17 07:00


 


Pulse  79   03/06/17 07:00


 


Resp  16   03/06/17 07:00


 


BP  128/76   03/06/17 07:00


 


Pulse Ox  99   03/06/17 07:00








 Intake & Output











 03/05/17 03/06/17 03/06/17





 18:59 06:59 18:59


 


Intake Total 240  


 


Balance 240  


 


Intake:   


 


  Oral 240  


 


Other:   


 


  # Voids 2 2 














- Exam


GENERAL: Pt awake and alert, well-appearing, well-nourished, and in no acute 

distress.


HEAD: Atraumatic, normocephalic.


EYES: Pupils equal, round, and reactive to light, extraocular movements intact, 

sclera anicteric, conjunctiva are normal.


ENT: Oropharynx clear without exudates.  Moist mucous membranes.


NECK: Supple without lymphadenopathy or JVD. 


LUNGS: Breath sounds clear to auscultation bilaterally.  No wheezes, rales, or 

rhonchi.


HEART: Heart S1, S2, no S3 or S4.  Regular rate and rhythm.  No murmurs, rubs 

or gallops.


ABDOMEN: Soft, nontender, nondistended, normoactive bowel sounds.  No guarding, 

no rebound.  No masses or organomegaly appreciated. 


EXTREMITIES: Palpable pulses.  Trace edema to bilateral lower extremities. No 

calf tenderness.


NEUROLOGICAL: Pt oriented x 3.  No focal deficits.  Strength and sensation 

grossly intact.


PSYCH: Normal mood, normal affect.


SKIN: Warm and dry.  Dressing to right axilla intact.





- Labs


CBC & Chem 7: 


 03/03/17 10:30





 03/06/17 11:17


Labs: 


 Abnormal Lab Results - Last 24 Hours (Table)











  03/06/17 Range/Units





  11:17 


 


Chloride  112 H  ()  mmol/L


 


BUN  20 H  (7-17)  mg/dL


 


Glucose  101 H  (74-99)  mg/dL














Assessment and Plan


Plan: 


Impression and plan: 





1.  Abscess of right axilla with minimal cellulitis with history of MRSA status 

post incision and drainage on 02/05/2017 currently treated with IV antibiotics 

and wound packing in the outpatient setting.  Dr. Abdul from infectious 

disease service has evaluated patient.  Patient is scheduled to go to wound 

care center tomorrow to have wound VAC placed.  Patient will continue IV 

antibiotics and local wound care per infectious disease recommendations.  

Continue supportive treatment and pain management. 


2.  Urinary hesitancy, present on admission, resolved.  


3.  Minimal edema to bilateral lower extremities.  Will apply NOY hose to 

prevent DVT.patient will be given 1 dose of Lasix 40 mg by mouth today.  


4.  Constipation, present on admission, resolved.  


5.  Hypokalemia, present on admission, resolved.


6.  Dehydration, resolved. Fluids currently are hep-locked.  


7.  Hypothyroidism.  Continue Synthroid.


8.  Fibromyalgia.  Continue Lyrica.


9.  Hypertension.  Continue lisinopril and hydrochlorothiazide.


10.  Acute kidney injury, present on admission, suspect secondary to 

dehydration and intravascular volume depletion, resolved.


11.  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


12.  Anxiety and depression.  Continue Abilify, Effexor.


13.  History of nicotine dependence.


14.  GERD.  Continue Protonix.


15.  DVT prophylaxis.  Continue Lovenox and NOY hose to bilateral lower 

extremities.


16.  GI prophylaxis.  Continue Protonix. 





Anticipate discharge tomorrow with follow-up in the wound care center at 8 AM  

for wound VAC placement.   





The above impression and plan have been discussed and directed by Dr. Carcamo. 

Lynette MCGOWAN acting as scribe for Dr. Carcamo. The patient may resume a regular diet. Please call Md's office for follow up appointment

## 2018-09-20 ENCOUNTER — HOSPITAL ENCOUNTER (OUTPATIENT)
Dept: HOSPITAL 47 - PNWHC3 | Age: 53
Discharge: HOME | End: 2018-09-20
Payer: COMMERCIAL

## 2018-09-20 VITALS — DIASTOLIC BLOOD PRESSURE: 71 MMHG | SYSTOLIC BLOOD PRESSURE: 117 MMHG | HEART RATE: 80 BPM | RESPIRATION RATE: 16 BRPM

## 2018-09-20 DIAGNOSIS — Z79.1: ICD-10-CM

## 2018-09-20 DIAGNOSIS — Z98.890: ICD-10-CM

## 2018-09-20 DIAGNOSIS — Z87.891: ICD-10-CM

## 2018-09-20 DIAGNOSIS — G89.29: Primary | ICD-10-CM

## 2018-09-20 DIAGNOSIS — M19.012: ICD-10-CM

## 2018-09-20 DIAGNOSIS — M79.671: ICD-10-CM

## 2018-09-20 DIAGNOSIS — Z79.899: ICD-10-CM

## 2018-09-20 DIAGNOSIS — M47.816: ICD-10-CM

## 2018-09-20 PROCEDURE — 99211 OFF/OP EST MAY X REQ PHY/QHP: CPT

## 2018-09-21 NOTE — P.PAINPG
Subjective


Progress Note Date: 09/20/18


This is a follow visit for this 53 years old female with a chronic history of 

severe low back pain diagnosed with lumbar spondylosis, done radiofrequency 

ablation of the medial branch lumbar area, and also patient complaining of 

severe left shoulder pain, a few weeks ago with good left shoulder steroid 

injection, currently she is on Motrin 800 mg 3 times a day and Ultram 50 mg 

every 6 hours when necessary for pain she is getting prescription refills from 

her primary care, and she is getting from [facility] pain 

clinic.  She is getting prescription refill for Lyrica 200 mg every 8 hours, 

she denies any side effects of the medication she denies any excessive 

drowsiness or sleepiness, and she reported that currently most of her pain 

localized on the right.  She had right foot surgery recently and she has some 

infection in the right foot, and she is being followed with her orthopedic 

surgeon, and she had hardware removal from the right foot, her low back pain 

currently under control.,her   Main problem is  right foot pain





Objective





- Vital Signs


Vital signs: 


 Vital Signs











Temp      


 


Pulse  80   09/20/18 13:07


 


Resp  16   09/20/18 13:07


 


BP  117/71   09/20/18 13:07


 


Pulse Ox  96   09/20/18 13:07








 Intake & Output











 09/20/18 09/21/18 09/21/18





 18:59 06:59 18:59


 


Weight 102.058 kg  














- Exam


    


  Physical Examinations  :


    1-Constitutiona             : Cooperative , not in acute distress .


     2-HEENT                      :  nech ;  supple ,  no Lymphadenopathy  , 

normal  thyroid  size .


                                                eyes  :  no ptosis , no icterus

,  no photophobia .  


                                                ENT  : normal    of hearing  , 

normal  oropharynx     , no Thrush .  


    3- Respiratory              : Chest clear to auscultations Bilaterally  ,  

no wheezing   , no Rhonchi   .  


    4- Cardiovascular       : regular rate and rhythem , S1 ,  S2  ,   no  S3 ,

  no  S4.


    5- Gastrointestinal       :  abdomen soft  no tenderness , bowel sounds  , 

no organomegally  .


    6- Genitourinary           :   Defferred .                                 

                                                                               

                                                                               

                                                                               

                                                                               

                       


    7- neurologic                 :   Cranial nerve II   to  XII  intact ,  no 

  focal neurological deffecit  .


    8-psychatric                  : alert ,  oriented  X 3  ,   appropriate 

affect   , intact judgment  and insight  .  


    9-Lymphatic                  :    no Lymphadenopathy .


   10- musculoskeltal       :     Left shoulder pain, abduction and adduction 

of the left shoulder.


                                   Right foot  wraped with the dressing.





Assessment and Plan


Plan: 


Assessment and plan= chronic low back pain secondary to lumbar spondylosis pain 

improved after radiofrequency of the medial branch lumbar area.


                                Chronic left shoulder pain secondary to 

osteoarthritis of the left shoulder .


                                Currently she is complaining of right foot pain 

status post hardware removal right foot, with possible infection


                                Patient getting prescription refill for Motrin 

and Ultram from her primary care, I will refill Lyrica 200 mg every 8 hours 

dispense 90 with 5 


                                refills , she will follow up with the pain 

clinic when necessary


Time with Patient: Less than 30





PQRS Measure Charge Sheet


Measure #130: Documentation of Current Meds in Medical Chart: Patient's 

medications documented in chart


Measure #226: Tobacco Use: Screen & Cessation Intervention: Pt not a tobacco 

user


Measure #111: Pneumonia Vaccination: Pneumococcal vaccine NOT administered or 

previously given


Measure #47: Advance Care Plan: Advance care planning discussed & documented, 

pt chose/unable to give


Measure #412: Opioid Treatment Agreement: No documentation of signed opioid 

treatment agreement


Measure #408: Opioid Therapy Follow-up Evaluation: Patient had NO f/u eval 

minimum every 3 months during opioid therapy


Measure #317: Preventitive Care & Scrn High Bld Press & F/U: Normal blood 

pressure, f/u not required


Measure #128: Body Mass Index (BMI) Screening & Follow-up: BMI documented ABOVE 

normal parameters - f/u documented


Measure #131: Pain Assessment & Follow-up: Pain positive & plan documented, 

Follow-up scheduled, Follow-up PRN


Measure #431: Unhealthy Alcohol Use Preventative Care & Scrn: Patient not 

identified as an unhealthy alcohol user


PQRS Narrative: 


 





Smoking Status                   Former smoker


Do You Want the Pneumonia        No


Vaccine AT THIS TIME?            


Blood Pressure                   117/71


Pain Intensity [Left Shoulder]   8


Scale Used                       Numeric (1 - 10)


Hx Alcohol Use (MH)              No








Home Medications: 


Ambulatory Orders





Estrogens, Conjugated [Premarin] 0.3 mg PO DAILY 06/09/16 


Baclofen 10 - 20 mg PO HS PRN 02/03/17 


Fluticasone Nasal Spray [Flonase Nasal Spray] 2 spray EA NOSTRIL BID 02/03/17 


Levothyroxine Sodium [Synthroid] 50 mcg PO DAILY 02/03/17 


Multivitamins, Thera [Multivitamin (formulary)] 1 tab PO DAILY 03/22/17 


Cyanocobalamin [Vitamin B-12 Injection] 1,000 mcg SQ QMONTH 07/21/17 


Ibuprofen [Motrin] 800 mg PO TID PRN 07/21/17 


Furosemide [Lasix] 40 mg PO DAILY PRN #0 07/25/17 


Lisinopril-Hctz 10-12.5 mg [Zestoretic 10-12.5] 1 tab PO DAILY #30 tab 07/25/17 


Ondansetron [Zofran] 4 mg PO Q6HR PRN  tab 07/25/17 


Pantoprazole [Protonix] 40 mg PO AC-BRKFST  tab 07/25/17 


Potassium Chloride ER [K-Dur 20] 20 meq PO DAILY PRN #0 07/25/17 


Venlafaxine HCl ER [Effexor XR] 225 mg PO DAILY  cap 07/25/17 


ARIPiprazole [Abilify] 10 mg PO DAILY 03/23/18 


Pregabalin [Lyrica] 200 mg PO Q8HR 05/17/18 


traMADol HCL [Ultram] 50 - 100 mg PO Q6HR PRN 05/25/18 











Controlled Substance Measures





- Controlled Substance Measures


Is patient prescribed a controlled substance at discharge?: No


When asked, does pt state using other controlled substances?: No


If prescribed controlled substance>3 days was MAPS reviewed?: No


If Rx opioid, was Start Talking consent form obtained?: No


If opioid is for acute pain is fill amount 7 days or less?: No


Was information provided regarding opioid addiction?: No

## 2024-08-03 ENCOUNTER — HOSPITAL ENCOUNTER (OUTPATIENT)
Dept: HOSPITAL 47 - EC | Age: 59
Setting detail: OBSERVATION
LOS: 1 days | Discharge: HOME | End: 2024-08-04
Attending: INTERNAL MEDICINE | Admitting: INTERNAL MEDICINE
Payer: MEDICARE

## 2024-08-03 DIAGNOSIS — G93.40: ICD-10-CM

## 2024-08-03 DIAGNOSIS — M54.50: ICD-10-CM

## 2024-08-03 DIAGNOSIS — M86.9: ICD-10-CM

## 2024-08-03 DIAGNOSIS — I95.89: ICD-10-CM

## 2024-08-03 DIAGNOSIS — Z79.899: ICD-10-CM

## 2024-08-03 DIAGNOSIS — E86.0: ICD-10-CM

## 2024-08-03 DIAGNOSIS — E03.9: ICD-10-CM

## 2024-08-03 DIAGNOSIS — G43.709: ICD-10-CM

## 2024-08-03 DIAGNOSIS — G89.29: ICD-10-CM

## 2024-08-03 DIAGNOSIS — M79.7: ICD-10-CM

## 2024-08-03 DIAGNOSIS — N17.9: ICD-10-CM

## 2024-08-03 DIAGNOSIS — I10: ICD-10-CM

## 2024-08-03 DIAGNOSIS — T40.2X1A: Primary | ICD-10-CM

## 2024-08-03 DIAGNOSIS — D64.9: ICD-10-CM

## 2024-08-03 DIAGNOSIS — F41.9: ICD-10-CM

## 2024-08-03 DIAGNOSIS — D49.7: ICD-10-CM

## 2024-08-03 DIAGNOSIS — T42.6X1A: ICD-10-CM

## 2024-08-03 DIAGNOSIS — Z87.891: ICD-10-CM

## 2024-08-03 DIAGNOSIS — G90.2: ICD-10-CM

## 2024-08-03 DIAGNOSIS — G24.01: ICD-10-CM

## 2024-08-03 DIAGNOSIS — K58.9: ICD-10-CM

## 2024-08-03 DIAGNOSIS — Z86.14: ICD-10-CM

## 2024-08-03 DIAGNOSIS — E87.8: ICD-10-CM

## 2024-08-03 DIAGNOSIS — E87.20: ICD-10-CM

## 2024-08-03 DIAGNOSIS — F32.A: ICD-10-CM

## 2024-08-03 DIAGNOSIS — Z82.49: ICD-10-CM

## 2024-08-03 DIAGNOSIS — Z90.710: ICD-10-CM

## 2024-08-03 DIAGNOSIS — F90.9: ICD-10-CM

## 2024-08-03 DIAGNOSIS — K21.9: ICD-10-CM

## 2024-08-03 LAB
ALBUMIN SERPL-MCNC: 3.3 G/DL (ref 3.5–5)
ALP SERPL-CCNC: 89 U/L (ref 38–126)
ALT SERPL-CCNC: 8 U/L (ref 4–34)
ANION GAP SERPL CALC-SCNC: 9 MMOL/L
APAP SPEC-MCNC: <10 UG/ML
APTT BLD: 24.4 SEC (ref 22–30)
AST SERPL-CCNC: 16 U/L (ref 14–36)
BASOPHILS # BLD AUTO: 0 K/UL (ref 0–0.2)
BASOPHILS NFR BLD AUTO: 0 %
BUN SERPL-SCNC: 26 MG/DL (ref 7–17)
CALCIUM SPEC-MCNC: 8.4 MG/DL (ref 8.4–10.2)
CHLORIDE SERPL-SCNC: 118 MMOL/L (ref 98–107)
CO2 SERPL-SCNC: 13 MMOL/L (ref 22–30)
EOSINOPHIL # BLD AUTO: 0.1 K/UL (ref 0–0.7)
EOSINOPHIL NFR BLD AUTO: 1 %
ERYTHROCYTE [DISTWIDTH] IN BLOOD BY AUTOMATED COUNT: 3.59 M/UL (ref 3.8–5.4)
ERYTHROCYTE [DISTWIDTH] IN BLOOD: 18.6 % (ref 11.5–15.5)
GLUCOSE BLD-MCNC: 101 MG/DL (ref 70–110)
GLUCOSE SERPL-MCNC: 62 MG/DL (ref 74–99)
HCT VFR BLD AUTO: 33 % (ref 34–46)
HGB BLD-MCNC: 9.8 GM/DL (ref 11.4–16)
INR PPP: 0.9 (ref ?–1.2)
LYMPHOCYTES # SPEC AUTO: 1.1 K/UL (ref 1–4.8)
LYMPHOCYTES NFR SPEC AUTO: 18 %
MCH RBC QN AUTO: 27.3 PG (ref 25–35)
MCHC RBC AUTO-ENTMCNC: 29.7 G/DL (ref 31–37)
MCV RBC AUTO: 92 FL (ref 80–100)
MONOCYTES # BLD AUTO: 0.4 K/UL (ref 0–1)
MONOCYTES NFR BLD AUTO: 6 %
NEUTROPHILS # BLD AUTO: 4.4 K/UL (ref 1.3–7.7)
NEUTROPHILS NFR BLD AUTO: 72 %
PLATELET # BLD AUTO: 232 K/UL (ref 150–450)
POTASSIUM SERPL-SCNC: 4.2 MMOL/L (ref 3.5–5.1)
PROT SERPL-MCNC: 5.8 G/DL (ref 6.3–8.2)
PT BLD: 10 SEC (ref 10–12.5)
SALICYLATES SERPL-MCNC: 1.7 MG/DL
SODIUM SERPL-SCNC: 140 MMOL/L (ref 137–145)
WBC # BLD AUTO: 6.1 K/UL (ref 3.8–10.6)

## 2024-08-03 PROCEDURE — 80179 DRUG ASSAY SALICYLATE: CPT

## 2024-08-03 PROCEDURE — 85610 PROTHROMBIN TIME: CPT

## 2024-08-03 PROCEDURE — 81003 URINALYSIS AUTO W/O SCOPE: CPT

## 2024-08-03 PROCEDURE — 71046 X-RAY EXAM CHEST 2 VIEWS: CPT

## 2024-08-03 PROCEDURE — 82140 ASSAY OF AMMONIA: CPT

## 2024-08-03 PROCEDURE — 80306 DRUG TEST PRSMV INSTRMNT: CPT

## 2024-08-03 PROCEDURE — 36415 COLL VENOUS BLD VENIPUNCTURE: CPT

## 2024-08-03 PROCEDURE — 85025 COMPLETE CBC W/AUTO DIFF WBC: CPT

## 2024-08-03 PROCEDURE — 99285 EMERGENCY DEPT VISIT HI MDM: CPT

## 2024-08-03 PROCEDURE — 96361 HYDRATE IV INFUSION ADD-ON: CPT

## 2024-08-03 PROCEDURE — 70450 CT HEAD/BRAIN W/O DYE: CPT

## 2024-08-03 PROCEDURE — 96360 HYDRATION IV INFUSION INIT: CPT

## 2024-08-03 PROCEDURE — 93005 ELECTROCARDIOGRAM TRACING: CPT

## 2024-08-03 PROCEDURE — 80048 BASIC METABOLIC PNL TOTAL CA: CPT

## 2024-08-03 PROCEDURE — 84484 ASSAY OF TROPONIN QUANT: CPT

## 2024-08-03 PROCEDURE — 85730 THROMBOPLASTIN TIME PARTIAL: CPT

## 2024-08-03 PROCEDURE — 80320 DRUG SCREEN QUANTALCOHOLS: CPT

## 2024-08-03 PROCEDURE — 80143 DRUG ASSAY ACETAMINOPHEN: CPT

## 2024-08-03 PROCEDURE — 80053 COMPREHEN METABOLIC PANEL: CPT

## 2024-08-03 RX ADMIN — CEFAZOLIN ONE MLS/HR: 330 INJECTION, POWDER, FOR SOLUTION INTRAMUSCULAR; INTRAVENOUS at 20:31

## 2024-08-03 RX ADMIN — DEXTROSE MONOHYDRATE STA: 25 INJECTION, SOLUTION INTRAVENOUS at 22:22

## 2024-08-03 RX ADMIN — CEFAZOLIN STA MLS/HR: 330 INJECTION, POWDER, FOR SOLUTION INTRAMUSCULAR; INTRAVENOUS at 20:33

## 2024-08-03 RX ADMIN — CEFAZOLIN ONE MLS/HR: 330 INJECTION, POWDER, FOR SOLUTION INTRAMUSCULAR; INTRAVENOUS at 20:30

## 2024-08-03 RX ADMIN — CEFAZOLIN ONE MLS/HR: 330 INJECTION, POWDER, FOR SOLUTION INTRAMUSCULAR; INTRAVENOUS at 22:04

## 2024-08-03 NOTE — XR
EXAMINATION TYPE: XR chest 2V

 

DATE OF EXAM: 8/3/2024 8:58 PM

 

CLINICAL INDICATION:Female, 59 years old with history of altered mental status; [facility]

 

COMPARISON: None

 

TECHNIQUE: XR chest 2V. Frontal and lateral views of the chest..

 

FINDINGS: 

Lines/Tubes/Devices:

No indwelling lines are seen.

 

Heart/mediastinum: Heart size upper normal.  Mediastinum appears normal.

 

Pulmonary vascularity: Not increased,

 

Lungs/Pleura: Lungs appear hyperinflated with interstitial coarsening, findings suggestive of COPD/em
physema. There is no evidence of pleural effusion, focal consolidation, or pneumothorax.  Mild linear
 scarring or atelectasis in the right lung base. Mild asymmetric elevation left hemidiaphragm likely 
from eventration.

 

Musculoskeletal: No acute osseous abnormality demonstrated in the limits of the exam.  Degenerative c
hanges of the spine and shoulders. Mild apex right scoliosis. 

 

Other findings: None.

 

IMPRESSION: 

No acute cardiopulmonary abnormality.

## 2024-08-03 NOTE — ED
Overdose HPI





- General


Chief Complaint: Overdose


Stated Complaint: AMS


Time Seen by Provider: 08/03/24 20:01


Source: EMS


Mode of arrival: EMS





- History of Present Illness


Initial Comments: 





59-year-old female with past medical history of chronic back pain, narcotic 

misuse who presents emergency department with altered mental status.  Patient 

was brought into the emergency department by EMS.  She came in from [place].  

It was reported that the patient had gone to the gas station and afterwards back

in her car.  Patient was in and out of consciousness for close to an hour before

they called EMS.  Patient does awaken and is able to answer questions 

appropriately however she seems overly sedated.  She does admit to taking 

Percocet, lyrica and muscle relaxers for her chronic back pain.  She denies any 

recent changes in her medication.  She denies any abuse of her medications.  

Patient denies any alcohol or illicit substance abuse.  No trauma.  Patient 

denies any recent illnesses.  She does admit that she did not sleep much last 

night as her dog woke her up early.  Patient arrives with reportedly lower blood

pressures.  Patient states that this has been a chronic thing for her.  States 

that her normal blood pressures are in the 80s systolic.





- Related Data


                                Home Medications











 Medication  Instructions  Recorded  Confirmed


 


Estrogens, Conjugated [Premarin] 0.3 mg PO DAILY 06/09/16 09/20/18


 


Baclofen 10 - 20 mg PO HS PRN 02/03/17 09/20/18


 


Fluticasone Nasal Spray [Flonase 2 spray EA NOSTRIL BID 02/03/17 09/20/18





Nasal Spray]   


 


Levothyroxine Sodium [Synthroid] 50 mcg PO DAILY 02/03/17 09/20/18


 


Multivitamins, Thera [Multivitamin 1 tab PO DAILY 03/22/17 09/20/18





(formulary)]   


 


Cyanocobalamin [Vitamin B-12 1,000 mcg SQ QMONTH 07/21/17 09/20/18





Injection]   


 


Ibuprofen [Motrin] 800 mg PO TID PRN 07/21/17 09/20/18


 


ARIPiprazole [Abilify] 10 mg PO DAILY 03/23/18 09/20/18


 


Pregabalin [Lyrica] 200 mg PO Q8HR 05/17/18 09/20/18


 


traMADol HCL [Ultram] 50 - 100 mg PO Q6HR PRN 05/25/18 09/20/18








                                  Previous Rx's











 Medication  Instructions  Recorded


 


Furosemide [Lasix] 40 mg PO DAILY PRN #0 07/25/17


 


Lisinopril-Hctz 10-12.5 mg 1 tab PO DAILY #30 tab 07/25/17





[Zestoretic 10-12.5]  


 


Ondansetron [Zofran] 4 mg PO Q6HR PRN  tab 07/25/17


 


Pantoprazole [Protonix] 40 mg PO AC-BRKFST  tab 07/25/17


 


Potassium Chloride ER [K-Dur 20] 20 meq PO DAILY PRN #0 07/25/17


 


Venlafaxine HCl ER [Effexor XR] 225 mg PO DAILY  cap 07/25/17











                                    Allergies











Allergy/AdvReac Type Severity Reaction Status Date / Time


 


No Known Allergies Allergy   Verified 09/20/18 13:04














Review of Systems


ROS Statement: 


Those systems with pertinent positive or pertinent negative responses have been 

documented in the HPI.





ROS Other: All systems not noted in ROS Statement are negative.





Past Medical History


Past Medical History: Eye Disorder, Fibromyalgia, Hypertension, Musculoskeletal 

Disorder, Skin Disorder, Thyroid Disorder


Additional Past Medical History / Comment(s): Jac's Syndrome Rt eye, 

HYPOGLYCEMIA, hx ANEMIA, chronic MIGRAINES, pituitary tumor, boot on rt foot, 

hiatal hernia, IBS, 6 SURGERIES on right ankle since July 2017 - infection in 

right ankle since October 2018, NOW ON PO AM RX.  CHRONIC PAIN IN NECK, BACK, RT

ROTATOR CUFF.


History of Any Multi-Drug Resistant Organisms: MRSA


Date of last positivie culture/infection: 03/22/2017


MDRO Source:: right armpit


Past Surgical History: Bariatric Surgery, Cholecystectomy, Hernia Repair, 

Hysterectomy, Orthopedic Surgery, Tonsillectomy


Additional Past Surgical History / Comment(s): -mult surgeries with 

hardware rt ankle,extra bone removed from rt ankle, TRACHEOSTOMY/ later REMOVED,

left foot bunionectomy, abdominoplasty, ORIF rt ankle 7/23/17.  MULT PAIN PROC. 

6 surgeries on right ankle since 2017.  9/7/2018 removal of hardware to left 

ankle.  9/11/18 PICC line insertion


Past Anesthesia/Blood Transfusion Reactions: No Reported Reaction


Past Psychological History: ADD/ADHD, Anxiety, Depression


Past Alcohol Use History: None Reported


Past Drug Use History: None Reported





- Past Family History


  ** Mother


Family Medical History: Coronary Artery Disease (CAD)





  ** Father


Family Medical History: Cancer





General Exam


Limitations: altered mental status


General appearance: lethargic


Head exam: Present: atraumatic, normocephalic, normal inspection


Eye exam: Present: normal appearance, PERRL, EOMI.  Absent: scleral icterus, 

conjunctival injection, periorbital swelling


ENT exam: Present: normal exam, mucous membranes moist


Neck exam: Present: normal inspection.  Absent: tenderness, meningismus, 

lymphadenopathy


Respiratory exam: Present: normal lung sounds bilaterally.  Absent: respiratory 

distress, wheezes, rales, rhonchi, stridor


Cardiovascular Exam: Present: regular rate, normal rhythm, normal heart sounds. 

Absent: systolic murmur, diastolic murmur, rubs, gallop, clicks


GI/Abdominal exam: Present: soft, normal bowel sounds.  Absent: distended, 

tenderness, guarding, rebound, rigid


Neurological exam: Present: altered, oriented X3, other (Patient is sedated 

however arousable by verbal stimuli.  Once awakened patient does provide all 

appropriate answers to questioning ever does quickly does back to sleep)


Psychiatric exam: Present: flat affect





Course


                                   Vital Signs











  08/03/24 08/03/24 08/03/24





  20:05 21:04 22:23


 


Temperature 98.3 F  


 


Pulse Rate 87 69 77


 


Respiratory 16 18 18





Rate   


 


Blood Pressure 68/39 89/38 91/60


 


O2 Sat by Pulse 94 L 97 96





Oximetry   














  08/03/24 08/04/24 08/04/24





  23:23 00:28 01:31


 


Temperature   


 


Pulse Rate 81 76 67


 


Respiratory 18 16 17





Rate   


 


Blood Pressure 98/69 94/57 87/53


 


O2 Sat by Pulse 98  100





Oximetry   














Medical Decision Making





- Medical Decision Making


Was pt. sent in by a medical professional or institution (, PA, NP, urgent 

care, hospital, or nursing home...) When possible be specific


@  -No


Did you speak to anyone other than the patient for history (EMS, parent, family,

police, friend...)? What history was obtained from this source 


@  -I spoke with EMS and police for history


Did you review nursing and triage notes (agree or disagree)?  Why? 


@  -I reviewed and agree with nursing and triage notes


Were old charts reviewed (outside hosp., previous admission, EMS record, old 

EKG, old radiological studies, urgent care reports/EKG's, nursing home records)?

Report findings 


@  -Reviewed ED hospitalization and discharge summary reports from 2017 where 

the patient was admitted for altered mental status


Differential Diagnosis (chest pain, altered mental status, abdominal pain women,

abdominal pain men, vaginal bleeding, weakness, fever, dyspnea, syncope, 

headache, dizziness, GI bleed, back pain, seizure, CVA, palpatations, mental 

health, musculoskeletal)? 


@  -Differential Altered Mental Status:


Hypoglycemia, DKA, hypercapnia, ETOH, overdose, CO poisoning, trauma, myxedema 

coma, HTN encephalopathy, infection, encephalitis, psychosis, intercranial 

hemorrhage, hepatic encephalopathy, meningitis, CVA, this is not meant to be an 

all-inclusive list





EKG interpreted by me (3pts min.).


@  -Yes and demonstrates sinus rhythm with rate of 82.  AK interval 180.  QRS 

105.  QTc of 433.  No acute ST segment elevations or depressions


X-rays interpreted by me (1pt min.).


@  -Yes and demonstrates no acute intrathoracic process


CT interpreted by me (1pt min.).


@  -Yes and demonstrates no acute intracranial process


U/S interpreted by me (1pt. min.).


@  -None done


What testing was considered but not performed or refused? (CT, X-rays, U/S, 

labs)? Why?


@  -None


What meds were considered but not given or refused? Why?


@  -None


Did you discuss the management of the patient with other professionals 

(professionals i.e. , PA, NP, lab, RT, psych nurse, , , 

teacher, , )? Give summary


@  -Discussed the case with Dr. Joel who will admit the patient


Was smoking cessation discussed for >3mins.?


@  -No


Was critical care preformed (if so, how long)?


@  -No


Were there social determinants of health that impacted care today? How? (Ho

melessness, low income, unemployed, alcoholism, drug addiction, transportation, 

low edu. Level, literacy, decrease access to med. care, [facility], rehab)?


@  -No


Was there de-escalation of care discussed even if they declined (Discuss DNR or 

withdrawal of care, Hospice)? DNR status


@  -No


What co-morbidities impacted this encounter? (DM, HTN, Smoking, COPD, CAD, 

Cancer, CVA, ARF, Chemo, Hep., AIDS, mental health diagnosis, sleep apnea, 

morbid obesity)?


@  -Chronic pain


Was patient admitted / discharged? Hospital course, mention meds given and 

route, prescriptions, significant lab abnormalities, going to OR and other 

pertinent info.


@  -Upon arrival patient seen and evaluated in room 6.  Thorough history and 

physical exam was performed.  Patient does arrive hypotensive.  She reports that

her blood pressure is normally on the low and.  We did initiate IV access and 

bolus the patient's 3 L of normal saline.  Laboratory studies are conducted.  

Chest x-ray and CT of the brain were performed.  Patient does have improvement 

in her mentation throughout her stay.  She did not require any Narcan as the 

patient was arousable to stimuli throughout her stay.  Her blood pressure does 

improve with IV fluids.  Glucose was noted to be 60 on CMP.  Patient is able to 

eat.  I did recommend overnight observation for her hypotension and altered 

mental status which the patient was agreeable.  Patient continues to deny any 

abuse of her medications.  Patient admitted to Dr. Joel


Undiagnosed new problem with uncertain prognosis?


@  -No


Drug Therapy requiring intensive monitoring for toxicity (Heparin, Nitro, 

Insulin, Cardizem)?


@  -No


Were any procedures done?


@  -No


Diagnosis/symptom?


@  -Acute encephalopathy, suspected medication effect, hypotension, hypoglycemia


Acute, or Chronic, or Acute on Chronic?


@  -Acute


Uncomplicated (without systemic symptoms) or Complicated (systemic symptoms)?


@  -Complicated


Side effects of treatment?


@  -No


Exacerbation, Progression, or Severe Exacerbation?


@  -No


Poses a threat to life or bodily function? How? (Chest pain, USA, MI, pneumonia,

PE, COPD, DKA, ARF, appy, cholecystitis, CVA, Diverticulitis, Homicidal, 

Suicidal, threat to staff... and all critical care pts)


@  -Yes as patient does arrive altered








- Lab Data


Result diagrams: 


                                 08/04/24 04:13





                                 08/04/24 04:13


                                   Lab Results











  08/03/24 08/03/24 08/03/24 Range/Units





  20:11 20:22 20:22 


 


WBC   6.1   (3.8-10.6)  k/uL


 


RBC   3.59 L   (3.80-5.40)  m/uL


 


Hgb   9.8 L   (11.4-16.0)  gm/dL


 


Hct   33.0 L   (34.0-46.0)  %


 


MCV   92.0   (80.0-100.0)  fL


 


MCH   27.3   (25.0-35.0)  pg


 


MCHC   29.7 L   (31.0-37.0)  g/dL


 


RDW   18.6 H   (11.5-15.5)  %


 


Plt Count   232   (150-450)  k/uL


 


MPV   7.4   


 


Neutrophils %   72   %


 


Lymphocytes %   18   %


 


Monocytes %   6   %


 


Eosinophils %   1   %


 


Basophils %   0   %


 


Neutrophils #   4.4   (1.3-7.7)  k/uL


 


Lymphocytes #   1.1   (1.0-4.8)  k/uL


 


Monocytes #   0.4   (0-1.0)  k/uL


 


Eosinophils #   0.1   (0-0.7)  k/uL


 


Basophils #   0.0   (0-0.2)  k/uL


 


Hypochromasia   Marked   


 


Anisocytosis   Slight   


 


PT    10.0  (10.0-12.5)  sec


 


INR    0.9  (<1.2)  


 


APTT    24.4  (22.0-30.0)  sec


 


Sodium     (137-145)  mmol/L


 


Potassium     (3.5-5.1)  mmol/L


 


Chloride     ()  mmol/L


 


Carbon Dioxide     (22-30)  mmol/L


 


Anion Gap     mmol/L


 


BUN     (7-17)  mg/dL


 


Creatinine     (0.52-1.04)  mg/dL


 


Est GFR (CKD-EPI)AfAm     (>60 ml/min/1.73 sqM)  


 


Est GFR (CKD-EPI)NonAf     (>60 ml/min/1.73 sqM)  


 


Glucose     (74-99)  mg/dL


 


POC Glucose (mg/dL)  101    ()  mg/dL


 


POC Glu Operater ID  Neyda, Angie    


 


Calcium     (8.4-10.2)  mg/dL


 


Total Bilirubin     (0.2-1.3)  mg/dL


 


AST     (14-36)  U/L


 


ALT     (4-34)  U/L


 


Alkaline Phosphatase     ()  U/L


 


Ammonia     (<30)  umol/L


 


Troponin I     (0.000-0.034)  ng/mL


 


Total Protein     (6.3-8.2)  g/dL


 


Albumin     (3.5-5.0)  g/dL


 


Salicylates     mg/dL


 


Acetaminophen     ug/mL


 


Serum Alcohol     mg/dL














  08/03/24 08/03/24 08/03/24 Range/Units





  20:22 20:22 20:22 


 


WBC     (3.8-10.6)  k/uL


 


RBC     (3.80-5.40)  m/uL


 


Hgb     (11.4-16.0)  gm/dL


 


Hct     (34.0-46.0)  %


 


MCV     (80.0-100.0)  fL


 


MCH     (25.0-35.0)  pg


 


MCHC     (31.0-37.0)  g/dL


 


RDW     (11.5-15.5)  %


 


Plt Count     (150-450)  k/uL


 


MPV     


 


Neutrophils %     %


 


Lymphocytes %     %


 


Monocytes %     %


 


Eosinophils %     %


 


Basophils %     %


 


Neutrophils #     (1.3-7.7)  k/uL


 


Lymphocytes #     (1.0-4.8)  k/uL


 


Monocytes #     (0-1.0)  k/uL


 


Eosinophils #     (0-0.7)  k/uL


 


Basophils #     (0-0.2)  k/uL


 


Hypochromasia     


 


Anisocytosis     


 


PT     (10.0-12.5)  sec


 


INR     (<1.2)  


 


APTT     (22.0-30.0)  sec


 


Sodium  140    (137-145)  mmol/L


 


Potassium  4.2    (3.5-5.1)  mmol/L


 


Chloride  118 H    ()  mmol/L


 


Carbon Dioxide  13 L    (22-30)  mmol/L


 


Anion Gap  9    mmol/L


 


BUN  26 H    (7-17)  mg/dL


 


Creatinine  1.69 H    (0.52-1.04)  mg/dL


 


Est GFR (CKD-EPI)AfAm  38    (>60 ml/min/1.73 sqM)  


 


Est GFR (CKD-EPI)NonAf  33    (>60 ml/min/1.73 sqM)  


 


Glucose  62 L    (74-99)  mg/dL


 


POC Glucose (mg/dL)     ()  mg/dL


 


POC Glu Operater ID     


 


Calcium  8.4    (8.4-10.2)  mg/dL


 


Total Bilirubin  0.3    (0.2-1.3)  mg/dL


 


AST  16    (14-36)  U/L


 


ALT  8    (4-34)  U/L


 


Alkaline Phosphatase  89    ()  U/L


 


Ammonia    30 H  (<30)  umol/L


 


Troponin I   <0.012   (0.000-0.034)  ng/mL


 


Total Protein  5.8 L    (6.3-8.2)  g/dL


 


Albumin  3.3 L    (3.5-5.0)  g/dL


 


Salicylates  1.7    mg/dL


 


Acetaminophen  <10.0    ug/mL


 


Serum Alcohol  <10    mg/dL














Disposition


Clinical Impression: 


 Decreased responsiveness, Acute encephalopathy, Hypotension, MYA (acute kidney 

injury)





Disposition: ADMITTED AS IP TO THIS [facility]


Condition: Stable


Is patient prescribed a controlled substance at d/c from ED?: No


Time of Disposition: 22:52


Decision to Admit Reason: Admit from EC


Decision Date: 08/03/24


Decision Time: 22:52

## 2024-08-03 NOTE — CT
EXAMINATION TYPE: CT brain wo con

 

CT DLP: 1125.4 mGycm, Automated exposure control for dose reduction was used.

 

DATE OF EXAM: 8/3/2024 8:50 PM

 

COMPARISON: None..

 

CLINICAL INDICATION:Female, 59 years old with history of Altered mental status, Overdose. AMS.

 

TECHNIQUE: 

Brain: Axial CT images of the brain were obtained with coronal and sagittal reformats created and rev
iewed.

Contrast used: None.

Oral contrast used: None.

 

FINDINGS:

Extra-axial spaces: No abnormal extra-axial fluid collections.  Basilar cisterns are patent.

Ventricular system: Within normal limits.

Cerebral parenchyma: No increased attenuation to suggest acute intraparenchymal hemorrhage.   The gra
y-white matter interface appears maintained.  No significant atrophy.  White matter unremarkable by C
T.

Cerebellum: No acute abnormality.

Mass effect: No evidence of mass effect or midline shift.

Intracranial vasculature: Unremarkable

Soft tissues: No acute or concerning abnormality.

Visualized orbits:  Orbital contents appear grossly intact.

Calvarium/osseous structures: No evidence of calvarial fracture.

Paranasal sinuses and mastoid air cells: Clear.

 

MRI is more sensitive for detecting acute processes such as infarct, and may be considered if clinica
lly warranted.

 

IMPRESSION:

No acute intracranial CT abnormality.

## 2024-08-04 VITALS — HEART RATE: 70 BPM | SYSTOLIC BLOOD PRESSURE: 118 MMHG | RESPIRATION RATE: 18 BRPM | DIASTOLIC BLOOD PRESSURE: 74 MMHG

## 2024-08-04 VITALS — TEMPERATURE: 98.4 F

## 2024-08-04 LAB
ANION GAP SERPL CALC-SCNC: 6 MMOL/L
BASOPHILS # BLD AUTO: 0 K/UL (ref 0–0.2)
BASOPHILS NFR BLD AUTO: 1 %
BUN SERPL-SCNC: 21 MG/DL (ref 7–17)
CALCIUM SPEC-MCNC: 8.1 MG/DL (ref 8.4–10.2)
CHLORIDE SERPL-SCNC: 122 MMOL/L (ref 98–107)
CO2 SERPL-SCNC: 13 MMOL/L (ref 22–30)
EOSINOPHIL # BLD AUTO: 0.2 K/UL (ref 0–0.7)
EOSINOPHIL NFR BLD AUTO: 4 %
ERYTHROCYTE [DISTWIDTH] IN BLOOD BY AUTOMATED COUNT: 3.53 M/UL (ref 3.8–5.4)
ERYTHROCYTE [DISTWIDTH] IN BLOOD: 18.5 % (ref 11.5–15.5)
GLUCOSE BLD-MCNC: 120 MG/DL (ref 70–110)
GLUCOSE BLD-MCNC: 92 MG/DL (ref 70–110)
GLUCOSE BLD-MCNC: 95 MG/DL (ref 70–110)
GLUCOSE BLD-MCNC: 97 MG/DL (ref 70–110)
GLUCOSE BLD-MCNC: 98 MG/DL (ref 70–110)
GLUCOSE SERPL-MCNC: 60 MG/DL (ref 74–99)
HCT VFR BLD AUTO: 33.4 % (ref 34–46)
HGB BLD-MCNC: 9.8 GM/DL (ref 11.4–16)
LYMPHOCYTES # SPEC AUTO: 1.8 K/UL (ref 1–4.8)
LYMPHOCYTES NFR SPEC AUTO: 42 %
MCH RBC QN AUTO: 27.7 PG (ref 25–35)
MCHC RBC AUTO-ENTMCNC: 29.3 G/DL (ref 31–37)
MCV RBC AUTO: 94.5 FL (ref 80–100)
MONOCYTES # BLD AUTO: 0.3 K/UL (ref 0–1)
MONOCYTES NFR BLD AUTO: 7 %
NEUTROPHILS # BLD AUTO: 1.8 K/UL (ref 1.3–7.7)
NEUTROPHILS NFR BLD AUTO: 43 %
PH UR: 6 [PH] (ref 5–8)
PLATELET # BLD AUTO: 207 K/UL (ref 150–450)
POTASSIUM SERPL-SCNC: 4 MMOL/L (ref 3.5–5.1)
SODIUM SERPL-SCNC: 141 MMOL/L (ref 137–145)
SP GR UR: 1.01 (ref 1–1.03)
UROBILINOGEN UR QL STRIP: <2 MG/DL (ref ?–2)
WBC # BLD AUTO: 4.2 K/UL (ref 3.8–10.6)

## 2024-08-04 RX ADMIN — FAMOTIDINE STA: 20 TABLET, FILM COATED ORAL at 13:51

## 2024-08-04 RX ADMIN — PANTOPRAZOLE SODIUM SCH MG: 40 TABLET, DELAYED RELEASE ORAL at 13:50

## 2024-08-04 RX ADMIN — PREGABALIN SCH MG: 100 CAPSULE ORAL at 15:04

## 2024-08-04 RX ADMIN — OXYCODONE HYDROCHLORIDE AND ACETAMINOPHEN SCH EACH: 10; 325 TABLET ORAL at 13:50

## 2024-08-04 NOTE — P.DS
Providers


Date of admission: 


08/03/24 22:52





Attending physician: 


Sohail Joel MD





Primary care physician: 


Bony Joel





Hospital Course: 


Final Diagnosis


Accidental overdose secondary to polypharmacy patient had been out of her 

oxycodone for 3 days and had just restarted on it.


Chronic pain and fibromyalgia


Acute kidney injury secondary to dehydration polypharmacy patient needs to 

discuss with her family doctor cutting back on some of her medications and 

should repeat blood work in 2 to 3 days


Metabolic acidosis secondary to hyperchloremia from IV fluids patient was given 

normal saline fluid bolus and maintenance fluids overnight.


Hypotension secondary to dehydration and polypharmacy. Improved with IV fluids. 


Hypothyroidism


ADD maintained on adderall twice a day 


Gastroesophageal reflux disease


Tardive dykinesia


Chronic migraines


Hx of IBS


Anxiety/depression 


Former smoker 





Discharge Disposition


Stable for discharge home to follow-up with her PCP Dr. Bony Joel in 1 to 2 

days. Overall guarded progonsis due to the polypharmacy and this needs to be a

ddressed on an outpatient basis. Patient to repeat her blood work in 2 to 3 

days.  Patient needs to cut back on her controlled medications to avoid 

oversedation.





Hospital Course


This is a 59-year-old female with medical history significant for fibromyalgia, 

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 

migraines, pituitary tumor, Tardive dyskinesia, multiple orthopedic surgeries to

right ankle with history of MRSA and osteomyelitis at that site.  Patient also 

has history of ADD anxiety depression former smoker.  Patient comes into the 

hospital for an accidental overdose was found at [place] in her car apparently 

was going in and out of consciousness patient does have history of narcotic use 

and misuse secondary to her chronic pain.  Patient admits to taking Percocets 

Lyrica muscle relaxers for her chronic back pain comes in and appears to be 

overly sedated.  Patient was hypotensive when she came into the hospital with a 

systolic blood pressure in the 80s and states that this was chronic for her.  

Patient is alert and oriented x 3 when she is evaluated on the medical floor.  

She is not having any dizziness or lightheadedness she has not had any nausea 

vomiting or diarrhea.  She does not have any focal logical deficits.  Patient 

denies alcohol use denies any illicit substances other than what is prescribed 

to her.  Patient is a non-smoker at this time. Brain CT on admission reveals no 

acute intracranial abnormality.  Chest x-ray done with no acute cardiopulmonary 

abnormality.  EKG reveals sinus rhythm heart rate of 82. Work reveals a white 

blood cell count of 4.2, hemoglobin 9.8, sodium level of 141, BUN of 21, 

creatinine of 1.31 which has improved from admission with a creatinine of 1.69, 

troponin level is negative urinalysis was negative.  Patient's drug toxicology 

is positive for opiates oxycodone, tricyclic antidepressants, amphetamines, 

benzodiazepines.  As patient's creatinine has improved she is alert and oriented

x 3 and was counseled extensively on the need to follow-up with her family 

doctor to discuss her polypharmacy as well as operating under the influence 

patient verbalizes understanding of this and will be discharged home.





Please see medication reconciliation for a list of current medications. Thank 

you for allowing us to participate in the care of this patient. 





The impression and plan of care has been dictated by Mary Sanchez, Nurse 

Practitioner as directed.





Dr. Mariluz MD


I have performed a history and physical examination and medical decision making 

of this patient, discussed the same with the dictator, and agree with the 

dictators assessment and plan as written, documented as a scribe. Based on total

visit time, I have performed more than 50% of this visit.





Patient Condition at Discharge: Fair





Plan - Discharge Summary


Discharge Rx Participant: No


New Discharge Prescriptions: 


Continue


   Cyanocobalamin [Vitamin B-12 Injection] 1,000 mcg SQ Q14D


   Ibuprofen [Motrin] 800 mg PO TID PRN


     PRN Reason: Pain


   Pregabalin [Lyrica] 200 mg PO TID


   rOPINIRole HCL [Requip] 2 mg PO HS


   estradioL [Estrace] 0.5 mg PO DAILY


   Valbenazine Tosylate [Ingrezza] 80 mg PO DAILY


   Oxybutynin ER [Ditropan XL] 10 mg PO DAILY


   Dextroamphetamine/Amphetamine [Adderall] 20 mg PO BID


   Omeprazole [PriLOSEC] 40 mg PO BID-W/MEALS


   ondansetron HCL [Zofran] 8 mg PO Q8HR PRN


     PRN Reason: Nausea And Vomiting


   SUMAtriptan succinate [Imitrex] 100 mg PO DAILY PRN


     PRN Reason: Migraine Headache


   busPIRone HCL 10 mg PO TID


   Lumateperone Tosylate [Caplyta] 21 mg PO DAILY


   Amitriptyline HCl [Elavil] 100 mg PO DAILY


   Famotidine [Pepcid] 40 mg PO BID


   Ergocalciferol (Vitamin D2) [Drisdol (50,000 Iu)] 1,250 mcg PO Q7D


   lamoTRIgine [LaMICtal] 200 mg PO DAILY


   Levothyroxine Sodium [Synthroid] 75 mcg PO DAILY


   methocarbamoL [Robaxin-750] 750 mg PO Q8H PRN


     PRN Reason: Pain


   traZODone HCL [Desyrel] 200 mg PO HS


   Loperamide [Imodium] 2 - 4 mg PO QID PRN


     PRN Reason: Diarrhea


   FLUoxetine HCL [PROzac] 20 mg PO DAILY


   Collagenase [Santyl Ointment] 1 applic TOPICAL DAILY


   Chlorzoxazone [Parafon Forte DSC] 500 mg PO TID


   oxyCODONE-APAP 10-325MG [Percocet  mg] 1 tab PO Q8HR


Discharge Medication List





Cyanocobalamin [Vitamin B-12 Injection] 1,000 mcg SQ Q14D 07/21/17 [History]


Ibuprofen [Motrin] 800 mg PO TID PRN 07/21/17 [History]


Pregabalin [Lyrica] 200 mg PO TID 05/17/18 [History]


Amitriptyline HCl [Elavil] 100 mg PO DAILY 08/04/24 [History]


Chlorzoxazone [Parafon Forte DSC] 500 mg PO TID 08/04/24 [History]


Collagenase [Santyl Ointment] 1 applic TOPICAL DAILY 08/04/24 [History]


Dextroamphetamine/Amphetamine [Adderall] 20 mg PO BID 08/04/24 [History]


Ergocalciferol (Vitamin D2) [Drisdol (50,000 Iu)] 1,250 mcg PO Q7D 08/04/24 

[History]


FLUoxetine HCL [PROzac] 20 mg PO DAILY 08/04/24 [History]


Famotidine [Pepcid] 40 mg PO BID 08/04/24 [History]


Levothyroxine Sodium [Synthroid] 75 mcg PO DAILY 08/04/24 [History]


Loperamide [Imodium] 2 - 4 mg PO QID PRN 08/04/24 [History]


Lumateperone Tosylate [Caplyta] 21 mg PO DAILY 08/04/24 [History]


Omeprazole [PriLOSEC] 40 mg PO BID-W/MEALS 08/04/24 [History]


Oxybutynin ER [Ditropan XL] 10 mg PO DAILY 08/04/24 [History]


SUMAtriptan succinate [Imitrex] 100 mg PO DAILY PRN 08/04/24 [History]


Valbenazine Tosylate [Ingrezza] 80 mg PO DAILY 08/04/24 [History]


busPIRone HCL 10 mg PO TID 08/04/24 [History]


estradioL [Estrace] 0.5 mg PO DAILY 08/04/24 [History]


lamoTRIgine [LaMICtal] 200 mg PO DAILY 08/04/24 [History]


methocarbamoL [Robaxin-750] 750 mg PO Q8H PRN 08/04/24 [History]


ondansetron HCL [Zofran] 8 mg PO Q8HR PRN 08/04/24 [History]


oxyCODONE-APAP 10-325MG [Percocet  mg] 1 tab PO Q8HR 08/04/24 [History]


rOPINIRole HCL [Requip] 2 mg PO HS 08/04/24 [History]


traZODone HCL [Desyrel] 200 mg PO HS 08/04/24 [History]








Follow up Appointment(s)/Referral(s): 


Bony Joel MD [Primary Care Provider] - 1-2 days


Ambulatory/Diagnostic Orders: 


Basic Metabolic Panel [LAB.AMB] Time Frame: 3 Days, Location: None Selected


Discharge Disposition: HOME SELF-CARE

## 2024-08-04 NOTE — P.HPIM
History of Present Illness


H&P Date: 08/04/24


This is a 59-year-old female with medical history significant for fibromyalgia, 

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 

migraines, pituitary tumor, Tardive dyskinesia, multiple orthopedic surgeries to

right ankle with history of MRSA and osteomyelitis at that site.  Patient also 

has history of ADD anxiety depression former smoker.  Patient comes into the 

hospital for an accidental overdose was found at [place] in her car apparently 

was going in and out of consciousness patient does have history of narcotic use 

and misuse secondary to her chronic pain.  Patient admits to taking Percocets 

Lyrica muscle relaxers for her chronic back pain comes in and appears to be 

overly sedated.  Patient was hypotensive when she came into the hospital with a 

systolic blood pressure in the 80s and states that this was chronic for her.  

Patient is alert and oriented x 3 when she is evaluated on the medical floor.  

She is not having any dizziness or lightheadedness she has not had any nausea 

vomiting or diarrhea.  She does not have any focal logical deficits.  Patient 

denies alcohol use denies any illicit substances other than what is prescribed 

to her.  Patient is a non-smoker at this time. Brain CT on admission reveals no 

acute intracranial abnormality.  Chest x-ray done with no acute cardiopulmonary 

abnormality.  EKG reveals sinus rhythm heart rate of 82. Work reveals a white 

blood cell count of 4.2, hemoglobin 9.8, sodium level of 141, BUN of 21, creatin

ine of 1.31 which has improved from admission with a creatinine of 1.69, 

troponin level is negative urinalysis was negative.  Patient's drug toxicology 

is positive for opiates oxycodone, tricyclic antidepressants, amphetamines, 

benzodiazepines. 





REVIEW OF SYSTEMS: 


CONSTITUTIONAL: No fever, no malaise, no fatigue. 


HEENT: No recent visual problems or hearing problems. Denied any sore throat. 


CARDIOVASCULAR: No chest pain, orthopnea, PND, no palpitations, no syncope. 


PULMONARY: No shortness of breath, no cough, no hemoptysis. 


GASTROINTESTINAL: No diarrhea, no nausea, no vomiting, no abdominal pain. 


NEUROLOGICAL: No headaches, no weakness, no numbness. 


HEMATOLOGICAL: Denies any bleeding or petechiae. 


GENITOURINARY: Denies any burning micturition, frequency, or urgency. 


MUSCULOSKELETAL/RHEUMATOLOGICAL: Denies any joint pain, swelling, or any muscle 

pain. 


ENDOCRINE: Denies any polyuria or polydipsia. 





The rest of the 14-point review of systems is negative.





PHYSICAL EXAMINATION: 





GENERAL: The patient is alert and oriented x3, not in any acute distress. Well 

developed, well nourished. 


HEENT: Pupils are round and equally reacting to light. EOMI. No scleral icterus.

No conjunctival pallor. Normocephalic, atraumatic. No pharyngeal erythema. No 

thyromegaly. 


CARDIOVASCULAR: S1 and S2 present. No murmurs, rubs, or gallops. 


PULMONARY: Chest is clear to auscultation, no wheezing or crackles. 


ABDOMEN: Soft, nontender, nondistended, normoactive bowel sounds. No palpable 

organomegaly. 


MUSCULOSKELETAL: No joint swelling or deformity.


EXTREMITIES: No cyanosis, clubbing, or pedal edema. 


NEUROLOGICAL: Gross neurological examination did not reveal any focal deficits. 


SKIN: No rashes. 








Assessment and plan


Assessment


Accidental overdose secondary to polypharmacy patient had been out of her 

oxycodone for 3 days and had just restarted on it.


Chronic pain and fibromyalgia


Acute kidney injury secondary to dehydration polypharmacy patient needs to 

discuss with her family doctor cutting back on some of her medications and 

should repeat blood work in 2 to 3 days


Metabolic acidosis secondary to hyperchloremia from IV fluids patient was given 

normal saline fluid bolus and maintenance fluids overnight.


Hypotension secondary to dehydration and polypharmacy. Improved with IV fluids. 


Hypothyroidism


ADD maintained on adderall twice a day 


Gastroesophageal reflux disease


Tardive dykinesia


Chronic migraines


Hx of IBS


Anxiety/depression Former smoker 


GI prophylaxis


DVT prophylaxis


Full Code 





Plan 


Patient's renal dysfunction has improved with IV fluids creatinine down to 1.31 

patient is to increase her oral intake of water and should repeat blood work in 

2 to 3 days.  Patient to follow-up with her PCP 1 to 2 days on discharge.  

Discussed polypharmacy with patient and the importance of avoiding driving while

under the influence of her chronic pain medication patient verbalizes 

understanding also states that she has been unable to sleep over the last couple

days and feels that she is having sleep deprivation mixed with the heat outside.

 Patient be discharged home today.





The impression and plan of care has been dictated by Mary Sanchez Nurse 

Practitioner as directed.





Dr. Mariluz MD


I have performed a history and physical examination and medical decision making 

of this patient, discussed the same with the dictator, and agree with the 

dictators assessment and plan as written, documented as a scribe. Based on total

visit time, I have performed more than 50% of this visit.








Past Medical History


Past Medical History: Eye Disorder, Fibromyalgia, Hypertension, Musculoskeletal 

Disorder, Skin Disorder, Thyroid Disorder


Additional Past Medical History / Comment(s): Jac's Syndrome Rt eye, 

HYPOGLYCEMIA, hx ANEMIA, chronic MIGRAINES, pituitary tumor, boot on rt foot, 

hiatal hernia, IBS, 13 SURGERIES on right ankle since July 2017 - 5/6 infection 

in right ankle since October 2018,   CHRONIC PAIN IN NECK, BACK, RT ROTATOR 

CUFF. tardive dyskensia. osteomyelitis.


History of Any Multi-Drug Resistant Organisms: MRSA


Date of last positivie culture/infection: 03/22/2017


MDRO Source:: right armpit


Past Surgical History: Bariatric Surgery, Cholecystectomy, Hernia Repair, 

Hysterectomy, Orthopedic Surgery, Tonsillectomy


Additional Past Surgical History / Comment(s): -mult surgeries with 

hardware rt ankle,extra bone removed from rt ankle, TRACHEOSTOMY r/t PNA/sepsis-

later REMOVED, left foot bunionectomy, abdominoplasty, ORIF rt ankle 7/23/17.  

MULT PAIN PROC.  13 surgeries on right ankle since 2017.  9/7/2018 removal of 

hardware to left ankle.  9/11/18 PICC line insertion


Past Anesthesia/Blood Transfusion Reactions: No Reported Reaction


Past Psychological History: ADD/ADHD, Anxiety, Depression


Additional Psychological History / Comment(s): Medically disabled


Smoking Status: Former smoker


Past Alcohol Use History: None Reported


Additional Past Alcohol Use History / Comment(s): started smoking 1983-2/2018, 

smoked 1/2 PPD  quit JAN 2023


Past Drug Use History: None Reported





- Past Family History


  ** Mother


Family Medical History: Coronary Artery Disease (CAD)





  ** Father


Family Medical History: Cancer





Medications and Allergies


                                Home Medications











 Medication  Instructions  Recorded  Confirmed  Type


 


Cyanocobalamin [Vitamin B-12 1,000 mcg SQ Q14D 07/21/17 08/04/24 History





Injection]    


 


Ibuprofen [Motrin] 800 mg PO TID PRN 07/21/17 08/04/24 History


 


Pregabalin [Lyrica] 200 mg PO TID 05/17/18 08/04/24 History


 


Amitriptyline HCl [Elavil] 100 mg PO DAILY 08/04/24 08/04/24 History


 


Chlorzoxazone [Parafon Forte DSC] 500 mg PO TID 08/04/24 08/04/24 History


 


Collagenase [Santyl Ointment] 1 applic TOPICAL DAILY 08/04/24 08/04/24 History


 


Dextroamphetamine/Amphetamine 20 mg PO BID 08/04/24 08/04/24 History





[Adderall]    


 


Ergocalciferol (Vitamin D2) 1,250 mcg PO Q7D 08/04/24 08/04/24 History





[Drisdol (50,000 Iu)]    


 


FLUoxetine HCL [PROzac] 20 mg PO DAILY 08/04/24 08/04/24 History


 


Famotidine [Pepcid] 40 mg PO BID 08/04/24 08/04/24 History


 


Levothyroxine Sodium [Synthroid] 75 mcg PO DAILY 08/04/24 08/04/24 History


 


Loperamide [Imodium] 2 - 4 mg PO QID PRN 08/04/24 08/04/24 History


 


Lumateperone Tosylate [Caplyta] 21 mg PO DAILY 08/04/24 08/04/24 History


 


Omeprazole [PriLOSEC] 40 mg PO BID-W/MEALS 08/04/24 08/04/24 History


 


Oxybutynin ER [Ditropan XL] 10 mg PO DAILY 08/04/24 08/04/24 History


 


SUMAtriptan succinate [Imitrex] 100 mg PO DAILY PRN 08/04/24 08/04/24 History


 


Valbenazine Tosylate [Ingrezza] 80 mg PO DAILY 08/04/24 08/04/24 History


 


busPIRone HCL 10 mg PO TID 08/04/24 08/04/24 History


 


estradioL [Estrace] 0.5 mg PO DAILY 08/04/24 08/04/24 History


 


lamoTRIgine [LaMICtal] 200 mg PO DAILY 08/04/24 08/04/24 History


 


methocarbamoL [Robaxin-750] 750 mg PO Q8H PRN 08/04/24 08/04/24 History


 


ondansetron HCL [Zofran] 8 mg PO Q8HR PRN 08/04/24 08/04/24 History


 


oxyCODONE-APAP 10-325MG [Percocet 1 tab PO Q8HR 08/04/24 08/04/24 History





 mg]    


 


rOPINIRole HCL [Requip] 2 mg PO HS 08/04/24 08/04/24 History


 


traZODone HCL [Desyrel] 200 mg PO HS 08/04/24 08/04/24 History








                                    Allergies











Allergy/AdvReac Type Severity Reaction Status Date / Time


 


No Known Allergies Allergy   Verified 08/04/24 11:33














Physical Exam


Vitals: 


                                   Vital Signs











  Temp Pulse Pulse Resp BP BP Pulse Ox


 


 08/04/24 07:59  98.4 F   73  15   117/76  98


 


 08/04/24 02:01  97.6 F   72  17   136/84  100


 


 08/04/24 01:31   67   17  87/53   100


 


 08/04/24 00:28   76   16  94/57  


 


 08/03/24 23:23   81   18  98/69   98


 


 08/03/24 22:23   77   18  91/60   96


 


 08/03/24 21:04   69   18  89/38   97


 


 08/03/24 20:05  98.3 F  87   16  68/39   94 L








                                Intake and Output











 08/03/24 08/04/24 08/04/24





 22:59 06:59 14:59


 


Output Total  700 


 


Balance  -700 


 


Output:   


 


  Urine  700 


 


Other:   


 


  Voiding Method  Toilet Bedside Commode


 


  Weight 113.398 kg 113.398 kg 














Results


CBC & Chem 7: 


                                 08/04/24 04:13





                                 08/04/24 04:13


Labs: 


                  Abnormal Lab Results - Last 24 Hours (Table)











  08/03/24 08/03/24 08/03/24 Range/Units





  20:22 20:22 20:22 


 


RBC  3.59 L    (3.80-5.40)  m/uL


 


Hgb  9.8 L    (11.4-16.0)  gm/dL


 


Hct  33.0 L    (34.0-46.0)  %


 


MCHC  29.7 L    (31.0-37.0)  g/dL


 


RDW  18.6 H    (11.5-15.5)  %


 


Chloride   118 H   ()  mmol/L


 


Carbon Dioxide   13 L   (22-30)  mmol/L


 


BUN   26 H   (7-17)  mg/dL


 


Creatinine   1.69 H   (0.52-1.04)  mg/dL


 


Glucose   62 L   (74-99)  mg/dL


 


POC Glucose (mg/dL)     ()  mg/dL


 


Calcium     (8.4-10.2)  mg/dL


 


Ammonia    30 H  (<30)  umol/L


 


Total Protein   5.8 L   (6.3-8.2)  g/dL


 


Albumin   3.3 L   (3.5-5.0)  g/dL


 


Urine Opiates Screen     (NotDetected)  


 


Ur Oxycodone Screen     (NotDetected)  


 


U Tricyclic Antidepress     (NotDetected)  


 


Ur Amphetamines Screen     (NotDetected)  


 


U Benzodiazepines Scrn     (NotDetected)  














  08/04/24 08/04/24 08/04/24 Range/Units





  00:31 02:00 04:13 


 


RBC    3.53 L  (3.80-5.40)  m/uL


 


Hgb    9.8 L  (11.4-16.0)  gm/dL


 


Hct    33.4 L  (34.0-46.0)  %


 


MCHC    29.3 L  (31.0-37.0)  g/dL


 


RDW    18.5 H  (11.5-15.5)  %


 


Chloride     ()  mmol/L


 


Carbon Dioxide     (22-30)  mmol/L


 


BUN     (7-17)  mg/dL


 


Creatinine     (0.52-1.04)  mg/dL


 


Glucose     (74-99)  mg/dL


 


POC Glucose (mg/dL)  120 H    ()  mg/dL


 


Calcium     (8.4-10.2)  mg/dL


 


Ammonia     (<30)  umol/L


 


Total Protein     (6.3-8.2)  g/dL


 


Albumin     (3.5-5.0)  g/dL


 


Urine Opiates Screen   Detected H   (NotDetected)  


 


Ur Oxycodone Screen   Detected H   (NotDetected)  


 


U Tricyclic Antidepress   Detected H   (NotDetected)  


 


Ur Amphetamines Screen   Detected H   (NotDetected)  


 


U Benzodiazepines Scrn   Detected H   (NotDetected)  














  08/04/24 Range/Units





  04:13 


 


RBC   (3.80-5.40)  m/uL


 


Hgb   (11.4-16.0)  gm/dL


 


Hct   (34.0-46.0)  %


 


MCHC   (31.0-37.0)  g/dL


 


RDW   (11.5-15.5)  %


 


Chloride  122 H  ()  mmol/L


 


Carbon Dioxide  13 L  (22-30)  mmol/L


 


BUN  21 H  (7-17)  mg/dL


 


Creatinine  1.31 H  (0.52-1.04)  mg/dL


 


Glucose  60 L  (74-99)  mg/dL


 


POC Glucose (mg/dL)   ()  mg/dL


 


Calcium  8.1 L  (8.4-10.2)  mg/dL


 


Ammonia   (<30)  umol/L


 


Total Protein   (6.3-8.2)  g/dL


 


Albumin   (3.5-5.0)  g/dL


 


Urine Opiates Screen   (NotDetected)  


 


Ur Oxycodone Screen   (NotDetected)  


 


U Tricyclic Antidepress   (NotDetected)  


 


Ur Amphetamines Screen   (NotDetected)  


 


U Benzodiazepines Scrn   (NotDetected)  














Thrombosis Risk Factor Assmnt





- Choose All That Apply


Any of the Below Risk Factors Present?: Yes


Each Factor Represents 1 point: Age 41-60 years


Other Risk Factors: No


Other congenital or acquired thrombophilia - If yes, enter type in comment: No


Thrombosis Risk Factor Assessment Total Risk Factor Score: 1


Thrombosis Risk Factor Assessment Level: Low Risk





Assessment and Plan


Time with Patient: Greater than 30

## 2025-07-14 ENCOUNTER — HOSPITAL ENCOUNTER (EMERGENCY)
Dept: HOSPITAL 47 - EC | Age: 60
Discharge: HOME | End: 2025-07-14
Payer: MEDICARE

## 2025-07-14 VITALS — SYSTOLIC BLOOD PRESSURE: 133 MMHG | DIASTOLIC BLOOD PRESSURE: 66 MMHG

## 2025-07-14 VITALS — RESPIRATION RATE: 16 BRPM | HEART RATE: 93 BPM | TEMPERATURE: 97.9 F

## 2025-07-14 DIAGNOSIS — R53.83: ICD-10-CM

## 2025-07-14 DIAGNOSIS — E87.21: Primary | ICD-10-CM

## 2025-07-14 DIAGNOSIS — Z87.891: ICD-10-CM

## 2025-07-14 LAB
ALBUMIN SERPL-MCNC: 4.9 G/DL (ref 3.5–5)
ALP SERPL-CCNC: 181 U/L (ref 38–126)
ALT SERPL-CCNC: 27 U/L (ref 4–34)
AMORPH SED URNS QL MICRO: (no result) /HPF
ANION GAP SERPL CALC-SCNC: 21 MMOL/L
ANISOCYTOSIS BLD QL SMEAR: (no result)
APPEARANCE UR: CLEAR
APTT BLD: 25.7 SEC (ref 22–30)
ARTERIAL PATENCY WRIST A: YES
AST SERPL-CCNC: 45 U/L (ref 14–36)
BACTERIA UR QL AUTO: (no result) /HPF
BASE EXCESS BLDA CALC-SCNC: -10.5 MMOL/L
BASO STIPL BLD QL SMEAR: (no result)
BASOPHILS # BLD AUTO: 0.04 10*3/UL (ref 0–0.1)
BASOPHILS NFR BLD AUTO: 0.5 %
BILIRUB BLD-MCNC: 0.8 MG/DL (ref 0.2–1.3)
BILIRUB UR QL CFM: (no result)
BILIRUB UR QL STRIP.AUTO: NEGATIVE
BROAD CASTS [PRESENCE] IN URINE BY COMPUTER ASSISTED METHOD: (no result) /LPF
BUN SERPL-SCNC: 25 MG/DL (ref 7–17)
BURR CELLS BLD QL SMEAR: (no result)
CA CARBONATE CRY #/AREA URNS HPF: (no result) /HPF
CA PHOS CRY UR QL COMP ASSIST: (no result) /HPF
CA-I BLDA-MCNC: (no result) MG/DL (ref 4.5–5.3)
CALCIUM SPEC-MCNC: 10.3 MG/DL (ref 8.4–10.2)
CAOX CRY UR QL COMP ASSIST: (no result) /HPF
CASTS URNS QL MICRO: (no result) /LPF
CHLORIDE SERPL-SCNC: 108 MMOL/L (ref 98–107)
CO2 BLDA-SCNC: 15 MMOL/L (ref 19–24)
CO2 SERPL-SCNC: 10 MMOL/L (ref 22–30)
COLOR UR: (no result)
CREATININE: 1.44 MG/DL (ref 0.52–1.04)
CRYSTALS UR QL: (no result) /HPF
CYSTINE CRY #/AREA URNS HPF: (no result) /HPF
DACRYOCYTES BLD QL SMEAR: (no result)
DOHLE BOD BLD QL SMEAR: (no result)
EOSINOPHIL # BLD AUTO: 0.08 10*3/UL (ref 0.04–0.35)
EOSINOPHIL NFR BLD AUTO: 1 %
EPITHELIAL CASTS [PRESENCE] IN URINE BY COMPUTER ASSISTED METHOD: (no result) /LPF
ERYTHROCYTE CLUMPS [PRESENCE] IN URINE BY COMPUTER ASSISTED METHOD: (no result) /HPF
ERYTHROCYTE [DISTWIDTH] IN BLOOD BY AUTOMATED COUNT: 4.31 10*6/UL (ref 4.1–5.2)
ERYTHROCYTE [DISTWIDTH] IN BLOOD: 15.8 % (ref 11.5–14.5)
FATTY CASTS #/AREA UR COMP ASSIST: (no result) /LPF
GAS PNL BLDA: 11.7 GM/DL (ref 11.4–16)
GLUCOSE BLDA-MCNC: (no result) MG/DL (ref 75–99)
GLUCOSE SERPL-MCNC: 87 MG/DL (ref 74–99)
GLUCOSE UR QL: NEGATIVE
GRAN CASTS UR QL COMP ASSIST: (no result) /LPF
HCO3 BLDA-SCNC: 14 MMOL/L (ref 21–25)
HCT VFR BLD AUTO: 38.1 % (ref 37.2–46.3)
HCT VFR BLDA CALC: (no result) % (ref 34–46)
HGB BLD-MCNC: 12.4 G/DL (ref 12–15)
HOWELL-JOLLY BOD BLD QL SMEAR: (no result)
HYALINE CASTS UR QL AUTO: (no result) /LPF (ref 0–2)
HYPOCHROMIA BLD QL SMEAR: (no result)
IMM GRANULOCYTES # BLD: 0.06 10*3/UL (ref 0–0.04)
INHALED O2 CONCENTRATION: 21 %
INR PPP: 0.9 (ref ?–1.2)
KETONES UR QL STRIP.AUTO: NEGATIVE
LACTATE BLDA-MCNC: (no result) MMOL/L (ref 0.5–1.6)
LEUCINE CRYSTALS [PRESENCE] IN URINE BY COMPUTER ASSISTED METHOD: (no result) /HPF
LEUKOCYTE ESTERASE UR QL STRIP.AUTO: NEGATIVE
LG PLATELETS BLD QL SMEAR: (no result)
LYMPHOCYTES # SPEC AUTO: 1.48 10*3/UL (ref 0.9–5)
LYMPHOCYTES NFR SPEC AUTO: 18.1 %
Lab: (no result)
MAGNESIUM SPEC-SCNC: 2.3 MG/DL (ref 1.6–2.3)
MCH RBC QN AUTO: 28.8 PG (ref 27–32)
MCHC RBC AUTO-ENTMCNC: 32.5 G/DL (ref 32–37)
MCV RBC AUTO: 88.4 FL (ref 80–97)
MIXED CELL CASTS UR QL COMP ASSIST: (no result) /LPF
MONOCYTES # BLD AUTO: 0.45 10*3/UL (ref 0.2–1)
MONOCYTES NFR BLD AUTO: 5.5 %
MUCOUS THREADS UR QL AUTO: (no result) /HPF
NEUTROPHILS # BLD AUTO: 6.06 10*3/UL (ref 1.8–7.7)
NEUTROPHILS NFR BLD AUTO: 74.2 %
NEUTS HYPERSEG # BLD: (no result) 10*3/UL
NITRITE UR QL STRIP.AUTO: NEGATIVE
NRBC BLD AUTO-RTO: (no result) %
OVAL FAT BODIES #/AREA UR COMP ASSIST: (no result) /HPF
OVALOCYTES BLD QL SMEAR: (no result)
PCO2 BLDA: 28 MMHG (ref 35–45)
PCO2 TEMP ADJ BLDA: (no result) MM[HG]
PELGER HUET CELLS BLD QL SMEAR: (no result)
PH BLDA: 7.32 [PH] (ref 7.35–7.45)
PH TEMP ADJ BLDA: (no result) [PH]
PH UR: 6 [PH] (ref 5–8)
PLATELET # BLD AUTO: 386 10*3/UL (ref 140–440)
PLATELETS.RETICULATED NFR BLD AUTO: (no result) %
PMV BLD AUTO: 9.9 FL (ref 9.5–12.2)
PO2 BLDA: 96 MMHG (ref 83–108)
PO2 TEMP ADJ BLDA: (no result) MM[HG]
POIKILOCYTOSIS BLD QL SMEAR: (no result)
POIKILOCYTOSIS BLD QL SMEAR: (no result)
POLYCHROMASIA BLD QL SMEAR: (no result)
POTASSIUM BLDA-SCNC: (no result) MMOL/L (ref 3.4–4.5)
POTASSIUM SERPL-SCNC: 4.2 MMOL/L (ref 3.5–5.1)
PROT SERPL-MCNC: 9.1 G/DL (ref 6.3–8.2)
PROT UR QL SSA: (no result)
PROT UR QL: (no result)
PT BLD: 10.3 SEC (ref 10–12.5)
RBC CASTS UR QL COMP ASSIST: (no result) /LPF
RBC MORPH BLD: (no result)
RBC UR QL: (no result) /HPF (ref 0–5)
RBC UR QL: NEGATIVE
RENAL EPI CELLS UR QL COMP ASSIST: (no result) /HPF
ROULEAUX BLD QL SMEAR: (no result)
SAO2 % BLDA: 94.8 % (ref 94–97)
SCHISTOCYTES BLD QL SMEAR: (no result)
SICKLE CELLS BLD QL SMEAR: (no result)
SODIUM BLDA-SCNC: (no result) MMOL/L (ref 135–146)
SODIUM SERPL-SCNC: 139 MMOL/L (ref 137–145)
SP GR UR: 1.03 (ref 1–1.03)
SPERM # UR AUTO: (no result) /HPF
SPHEROCYTES BLD QL SMEAR: (no result)
SQUAMOUS UR QL AUTO: (no result) /HPF (ref 0–4)
STOMATOCYTES BLD QL SMEAR: (no result)
T3FREE SERPL-MCNC: 2.5 PG/ML (ref 2.3–4.2)
T4 FREE SERPL-MCNC: 1.81 NG/DL (ref 0.78–2.19)
TARGETS BLD QL SMEAR: (no result)
TOXIC GRANULES BLD QL SMEAR: (no result)
TRANS CELLS UR QL COMP ASSIST: (no result) /HPF (ref 0–1)
TRI-PHOS CRY UR QL COMP ASSIST: (no result) /HPF
TRICHOMONAS UR QL COMP ASSIST: (no result) /HPF
TSH SERPL DL<=0.005 MIU/L-ACNC: 0.15 MIU/L (ref 0.47–4.68)
TYROSINE CRYSTALS [PRESENCE] IN URINE BY COMPUTER ASSISTED METHOD: (no result) /HPF
URATE CRY UR QL COMP ASSIST: (no result) /HPF
UROBILINOGEN UR QL STRIP: <2 MG/DL (ref ?–2)
VARIANT LYMPHS BLD QL SMEAR: (no result)
WAXY CASTS #/AREA UR COMP ASSIST: (no result) /LPF
WBC # BLD AUTO: 8.17 10*3/UL (ref 4.5–10)
WBC # UR AUTO: (no result) /HPF (ref 0–5)
WBC CASTS #/AREA URNS LPF: (no result) /LPF
WBC CLUMPS UR QL AUTO: (no result) /HPF
WBC NRBC COR # BLD: (no result) K/UL
WBC TOXIC VACUOLES BLD QL SMEAR: (no result)
YEAST BUDDING UR QL COMP ASSIST: (no result) /HPF
YEAST HYPHAE UR QL COMP ASSIST: (no result) /HPF

## 2025-07-14 PROCEDURE — 93005 ELECTROCARDIOGRAM TRACING: CPT

## 2025-07-14 PROCEDURE — 82805 BLOOD GASES W/O2 SATURATION: CPT

## 2025-07-14 PROCEDURE — 85025 COMPLETE CBC W/AUTO DIFF WBC: CPT

## 2025-07-14 PROCEDURE — 85610 PROTHROMBIN TIME: CPT

## 2025-07-14 PROCEDURE — 36415 COLL VENOUS BLD VENIPUNCTURE: CPT

## 2025-07-14 PROCEDURE — 84481 FREE ASSAY (FT-3): CPT

## 2025-07-14 PROCEDURE — 85730 THROMBOPLASTIN TIME PARTIAL: CPT

## 2025-07-14 PROCEDURE — 71046 X-RAY EXAM CHEST 2 VIEWS: CPT

## 2025-07-14 PROCEDURE — 83735 ASSAY OF MAGNESIUM: CPT

## 2025-07-14 PROCEDURE — 80053 COMPREHEN METABOLIC PANEL: CPT

## 2025-07-14 PROCEDURE — 84439 ASSAY OF FREE THYROXINE: CPT

## 2025-07-14 PROCEDURE — 81003 URINALYSIS AUTO W/O SCOPE: CPT

## 2025-07-14 PROCEDURE — 36600 WITHDRAWAL OF ARTERIAL BLOOD: CPT

## 2025-07-14 PROCEDURE — 96374 THER/PROPH/DIAG INJ IV PUSH: CPT

## 2025-07-14 PROCEDURE — 96361 HYDRATE IV INFUSION ADD-ON: CPT

## 2025-07-14 PROCEDURE — 96375 TX/PRO/DX INJ NEW DRUG ADDON: CPT

## 2025-07-14 PROCEDURE — 99284 EMERGENCY DEPT VISIT MOD MDM: CPT

## 2025-07-14 PROCEDURE — 84443 ASSAY THYROID STIM HORMONE: CPT

## 2025-07-14 PROCEDURE — 83605 ASSAY OF LACTIC ACID: CPT

## 2025-07-14 PROCEDURE — 84484 ASSAY OF TROPONIN QUANT: CPT

## 2025-07-14 RX ADMIN — SODIUM BICARBONATE STA ML: 84 INJECTION, SOLUTION INTRAVENOUS at 16:03

## 2025-07-14 RX ADMIN — CEFAZOLIN STA MLS/HR: 330 INJECTION, POWDER, FOR SOLUTION INTRAMUSCULAR; INTRAVENOUS at 16:04

## 2025-07-14 RX ADMIN — MORPHINE SULFATE STA MG: 4 INJECTION, SOLUTION INTRAMUSCULAR; INTRAVENOUS at 13:52
